# Patient Record
Sex: FEMALE | Race: WHITE | NOT HISPANIC OR LATINO | Employment: OTHER | URBAN - METROPOLITAN AREA
[De-identification: names, ages, dates, MRNs, and addresses within clinical notes are randomized per-mention and may not be internally consistent; named-entity substitution may affect disease eponyms.]

---

## 2017-09-07 RX ORDER — VALSARTAN AND HYDROCHLOROTHIAZIDE 320; 25 MG/1; MG/1
TABLET, FILM COATED ORAL
Qty: 90 TABLET | Refills: 1 | Status: SHIPPED | OUTPATIENT
Start: 2017-09-07 | End: 2018-04-20 | Stop reason: SDUPTHER

## 2017-09-07 RX ORDER — ATORVASTATIN CALCIUM 20 MG/1
TABLET, FILM COATED ORAL
Qty: 90 TABLET | Refills: 1 | Status: SHIPPED | OUTPATIENT
Start: 2017-09-07 | End: 2018-04-20 | Stop reason: SDUPTHER

## 2017-10-09 PROBLEM — J45.30 MILD PERSISTENT ASTHMA: Status: ACTIVE | Noted: 2017-10-09

## 2017-10-09 PROBLEM — G80.9 CEREBRAL PALSY: Status: ACTIVE | Noted: 2017-10-09

## 2017-10-09 PROBLEM — B19.10 HEPATITIS B: Status: ACTIVE | Noted: 2017-10-09

## 2017-10-09 PROBLEM — E78.5 HYPERLIPIDEMIA: Status: ACTIVE | Noted: 2017-10-09

## 2017-10-09 PROBLEM — J42 CHRONIC BRONCHITIS: Status: ACTIVE | Noted: 2017-10-09

## 2017-10-09 PROBLEM — I10 ESSENTIAL HYPERTENSION: Status: ACTIVE | Noted: 2017-10-09

## 2017-10-10 ENCOUNTER — OFFICE VISIT (OUTPATIENT)
Dept: PRIMARY CARE CLINIC | Facility: CLINIC | Age: 67
End: 2017-10-10
Payer: MEDICARE

## 2017-10-10 VITALS
DIASTOLIC BLOOD PRESSURE: 81 MMHG | TEMPERATURE: 99 F | HEART RATE: 76 BPM | SYSTOLIC BLOOD PRESSURE: 138 MMHG | OXYGEN SATURATION: 98 % | WEIGHT: 192.81 LBS | RESPIRATION RATE: 18 BRPM

## 2017-10-10 DIAGNOSIS — J45.31 MILD PERSISTENT ASTHMA WITH ACUTE EXACERBATION: Primary | ICD-10-CM

## 2017-10-10 PROCEDURE — 96372 THER/PROPH/DIAG INJ SC/IM: CPT | Mod: PBBFAC,PN

## 2017-10-10 PROCEDURE — 99213 OFFICE O/P EST LOW 20 MIN: CPT | Mod: PBBFAC,PN,25 | Performed by: NURSE PRACTITIONER

## 2017-10-10 PROCEDURE — 99999 PR PBB SHADOW E&M-EST. PATIENT-LVL III: CPT | Mod: PBBFAC,,, | Performed by: NURSE PRACTITIONER

## 2017-10-10 PROCEDURE — 99214 OFFICE O/P EST MOD 30 MIN: CPT | Mod: S$PBB,,, | Performed by: NURSE PRACTITIONER

## 2017-10-10 RX ORDER — DOXYCYCLINE 100 MG/1
100 CAPSULE ORAL EVERY 12 HOURS
Qty: 20 CAPSULE | Refills: 0 | Status: SHIPPED | OUTPATIENT
Start: 2017-10-10 | End: 2017-10-20

## 2017-10-10 RX ORDER — METHYLPREDNISOLONE 4 MG/1
TABLET ORAL
Qty: 1 PACKAGE | Refills: 0 | Status: SHIPPED | OUTPATIENT
Start: 2017-10-10 | End: 2018-10-03

## 2017-10-10 RX ORDER — BETAMETHASONE SODIUM PHOSPHATE AND BETAMETHASONE ACETATE 3; 3 MG/ML; MG/ML
6 INJECTION, SUSPENSION INTRA-ARTICULAR; INTRALESIONAL; INTRAMUSCULAR; SOFT TISSUE
Status: COMPLETED | OUTPATIENT
Start: 2017-10-10 | End: 2017-10-10

## 2017-10-10 RX ADMIN — BETAMETHASONE ACETATE AND BETAMETHASONE SODIUM PHOSPHATE 6 MG: 3; 3 INJECTION, SUSPENSION INTRA-ARTICULAR; INTRALESIONAL; INTRAMUSCULAR; SOFT TISSUE at 01:10

## 2017-10-10 NOTE — PROGRESS NOTES
"Chief Complaint  Chief Complaint   Patient presents with    Cough    Nasal Congestion       HPI  Julissa Singletary is a 66 y.o. female with medical diagnoses as listed within the medical history and problem list that presents for cough, congestion, and wheezing.  Patient is new to me but known to the clinic. Presents with worsening cough and wheezing for approximately 7 days. Wheezing worse at night. Cough is productive, white. No fever, sore throat, n/v/d. H/o asthma, controlled by "inhaler". Patient does not know name of the inhaler. States that this happens yearly and she frequently gets pneumonia following the exacerbation. Denies cp, sob, palpitations.     PAST MEDICAL HISTORY:  Past Medical History:   Diagnosis Date    Essential hypertension 10/9/2017       PAST SURGICAL HISTORY:  History reviewed. No pertinent surgical history.    SOCIAL HISTORY:  Social History     Social History    Marital status:      Spouse name: N/A    Number of children: N/A    Years of education: N/A     Occupational History    Not on file.     Social History Main Topics    Smoking status: Never Smoker    Smokeless tobacco: Never Used    Alcohol use Yes    Drug use: No    Sexual activity: Yes     Other Topics Concern    Not on file     Social History Narrative    No narrative on file       FAMILY HISTORY:  Family History   Problem Relation Age of Onset    No Known Problems Mother     No Known Problems Father     No Known Problems Sister     No Known Problems Brother     No Known Problems Maternal Aunt     No Known Problems Maternal Uncle     No Known Problems Paternal Aunt     No Known Problems Paternal Uncle     No Known Problems Maternal Grandmother     No Known Problems Maternal Grandfather     No Known Problems Paternal Grandmother     No Known Problems Paternal Grandfather     Amblyopia Neg Hx     Blindness Neg Hx     Cancer Neg Hx     Cataracts Neg Hx     Diabetes Neg Hx     Glaucoma Neg " Hx     Hypertension Neg Hx     Macular degeneration Neg Hx     Retinal detachment Neg Hx     Strabismus Neg Hx     Stroke Neg Hx     Thyroid disease Neg Hx        ALLERGIES AND MEDICATIONS: updated and reviewed.  Review of patient's allergies indicates:  No Known Allergies  Current Outpatient Prescriptions   Medication Sig Dispense Refill    atorvastatin (LIPITOR) 20 MG tablet TAKE 1 TABLET BY MOUTH ONCE DAILY 90 tablet 1    BYSTOLIC 5 mg Tab TK 1 T PO  ONCE D  1    trazodone (DESYREL) 50 MG tablet TK 1 TO 2 TS PO QHS PRN  2    valsartan-hydrochlorothiazide (DIOVAN-HCT) 320-25 mg per tablet TAKE 1 TABLET BY MOUTH ONCE DAILY 90 tablet 1    doxycycline (VIBRAMYCIN) 100 MG Cap Take 1 capsule (100 mg total) by mouth every 12 (twelve) hours. 20 capsule 0    methylPREDNISolone (MEDROL DOSEPACK) 4 mg tablet use as directed 1 Package 0     Current Facility-Administered Medications   Medication Dose Route Frequency Provider Last Rate Last Dose    betamethasone acetate-betamethasone sodium phosphate injection 6 mg  6 mg Intramuscular 1 time in Clinic/HOD Tanna Mckeon NP             ROS  Review of Systems   Constitutional: Negative for chills, fatigue and fever.   HENT: Positive for congestion. Negative for rhinorrhea, sinus pressure and sore throat.    Respiratory: Positive for cough and wheezing. Negative for chest tightness and shortness of breath.    Cardiovascular: Negative for chest pain and palpitations.   Gastrointestinal: Negative for blood in stool, diarrhea, nausea and vomiting.   Genitourinary: Negative for dysuria, frequency, hematuria and urgency.   Musculoskeletal: Negative for arthralgias and joint swelling.   Skin: Negative for rash and wound.   Neurological: Positive for headaches. Negative for dizziness.   Psychiatric/Behavioral: Negative for dysphoric mood and sleep disturbance. The patient is not nervous/anxious.            PHYSICAL EXAM  Vitals:    10/10/17 1316   BP: 138/81   BP  Location: Right arm   Patient Position: Sitting   BP Method: Medium (Automatic)   Pulse: 76   Resp: 18   Temp: 98.6 °F (37 °C)   TempSrc: Oral   SpO2: 98%   Weight: 87.5 kg (192 lb 12.8 oz)    There is no height or weight on file to calculate BMI.  Weight: 87.5 kg (192 lb 12.8 oz)           Physical Exam   Constitutional: She is oriented to person, place, and time. She appears well-developed and well-nourished.   HENT:   Head: Normocephalic.   Right Ear: Tympanic membrane normal.   Left Ear: Tympanic membrane normal.   Mouth/Throat: Uvula is midline and mucous membranes are normal. Posterior oropharyngeal erythema present.   Eyes: Conjunctivae are normal.   Cardiovascular: Normal rate, regular rhythm, normal heart sounds and normal pulses.    No murmur heard.  Pulses:       Radial pulses are 2+ on the right side, and 2+ on the left side.   Pulmonary/Chest: Effort normal. She has wheezes. She has no rhonchi. She has no rales.   Abdominal: Soft. Bowel sounds are increased. There is no tenderness.   Musculoskeletal: She exhibits no edema.   Lymphadenopathy:     She has no cervical adenopathy.   Neurological: She is alert and oriented to person, place, and time.   Skin: Skin is warm and dry. No rash noted.   Psychiatric: She has a normal mood and affect.         Health Maintenance       Date Due Completion Date    Hepatitis C Screening 1950 ---    TETANUS VACCINE 10/29/1969 ---    Mammogram 10/29/1991 ---    DEXA SCAN 10/29/1991 ---    Colonoscopy 10/29/2001 ---    Lipid Panel 04/06/2011 4/6/2006    Zoster Vaccine 10/29/2011 ---    Pneumococcal (65+) (1 of 2 - PCV13) 10/29/2016 ---            Assessment & Plan    Julissa was seen today for cough and nasal congestion.    Diagnoses and all orders for this visit:    Mild persistent asthma with acute exacerbation  -     betamethasone acetate-betamethasone sodium phosphate injection 6 mg; Inject 1 mL (6 mg total) into the muscle one time.  -     methylPREDNISolone (MEDROL  DOSEPACK) 4 mg tablet; use as directed  -     doxycycline (VIBRAMYCIN) 100 MG Cap; Take 1 capsule (100 mg total) by mouth every 12 (twelve) hours.  - Instructed to start doxy with fever, worsening of symptoms, sinus pressure after 3 more days.       Follow-up: Return if symptoms worsen or fail to improve.

## 2017-12-13 ENCOUNTER — OFFICE VISIT (OUTPATIENT)
Dept: PRIMARY CARE CLINIC | Facility: CLINIC | Age: 67
End: 2017-12-13
Payer: MEDICARE

## 2017-12-13 VITALS
DIASTOLIC BLOOD PRESSURE: 84 MMHG | HEART RATE: 78 BPM | TEMPERATURE: 98 F | WEIGHT: 199.31 LBS | HEIGHT: 62 IN | OXYGEN SATURATION: 97 % | BODY MASS INDEX: 36.68 KG/M2 | RESPIRATION RATE: 18 BRPM | SYSTOLIC BLOOD PRESSURE: 142 MMHG

## 2017-12-13 DIAGNOSIS — B34.9 VIRAL SYNDROME: Primary | ICD-10-CM

## 2017-12-13 DIAGNOSIS — I10 ESSENTIAL HYPERTENSION: ICD-10-CM

## 2017-12-13 DIAGNOSIS — J32.9 SINUSITIS, UNSPECIFIED CHRONICITY, UNSPECIFIED LOCATION: ICD-10-CM

## 2017-12-13 DIAGNOSIS — R05.9 COUGH: ICD-10-CM

## 2017-12-13 LAB
CTP QC/QA: YES
FLUAV AG NPH QL: NEGATIVE
FLUBV AG NPH QL: NEGATIVE

## 2017-12-13 PROCEDURE — 99214 OFFICE O/P EST MOD 30 MIN: CPT | Mod: PBBFAC,PN | Performed by: INTERNAL MEDICINE

## 2017-12-13 PROCEDURE — 99213 OFFICE O/P EST LOW 20 MIN: CPT | Mod: S$PBB,,, | Performed by: INTERNAL MEDICINE

## 2017-12-13 PROCEDURE — 87804 INFLUENZA ASSAY W/OPTIC: CPT | Mod: 59,PBBFAC,PN | Performed by: INTERNAL MEDICINE

## 2017-12-13 PROCEDURE — 99999 PR PBB SHADOW E&M-EST. PATIENT-LVL IV: CPT | Mod: PBBFAC,,, | Performed by: INTERNAL MEDICINE

## 2017-12-13 PROCEDURE — 96372 THER/PROPH/DIAG INJ SC/IM: CPT | Mod: PBBFAC,PN

## 2017-12-13 RX ORDER — LINCOMYCIN HYDROCHLORIDE 300 MG/ML
600 INJECTION, SOLUTION INTRAMUSCULAR; INTRAVENOUS; SUBCONJUNCTIVAL
Status: COMPLETED | OUTPATIENT
Start: 2017-12-13 | End: 2017-12-13

## 2017-12-13 RX ORDER — AMOXICILLIN AND CLAVULANATE POTASSIUM 875; 125 MG/1; MG/1
1 TABLET, FILM COATED ORAL EVERY 12 HOURS
Qty: 20 TABLET | Refills: 0 | Status: SHIPPED | OUTPATIENT
Start: 2017-12-13 | End: 2017-12-23

## 2017-12-13 RX ORDER — BETAMETHASONE SODIUM PHOSPHATE AND BETAMETHASONE ACETATE 3; 3 MG/ML; MG/ML
12 INJECTION, SUSPENSION INTRA-ARTICULAR; INTRALESIONAL; INTRAMUSCULAR; SOFT TISSUE
Status: COMPLETED | OUTPATIENT
Start: 2017-12-13 | End: 2017-12-13

## 2017-12-13 RX ORDER — CODEINE PHOSPHATE AND GUAIFENESIN 10; 100 MG/5ML; MG/5ML
5 SOLUTION ORAL 3 TIMES DAILY PRN
Qty: 150 ML | Refills: 0 | Status: SHIPPED | OUTPATIENT
Start: 2017-12-13 | End: 2017-12-23

## 2017-12-13 RX ADMIN — LINCOMYCIN HYDROCHLORIDE 600 MG: 300 INJECTION, SOLUTION INTRAMUSCULAR; INTRAVENOUS; SUBCONJUNCTIVAL at 05:12

## 2017-12-13 RX ADMIN — BETAMETHASONE ACETATE AND BETAMETHASONE SODIUM PHOSPHATE 12 MG: 3; 3 INJECTION, SUSPENSION INTRA-ARTICULAR; INTRALESIONAL; INTRAMUSCULAR; SOFT TISSUE at 05:12

## 2017-12-14 NOTE — PROGRESS NOTES
Subjective:       Patient ID: Julissa Singletary is a 67 y.o. female.    Chief Complaint: Bronchitis    HPI   Patient complaining of cough and congestion short of breat for for 5 days coughing up white yellow phlegm happen once a year in the wintertime it did have a flu shot this year no nausea vomiting diarrhea  Review of Systems    Objective:      Physical Exam   Constitutional: She is oriented to person, place, and time. She appears well-developed and well-nourished. No distress.   HENT:   Head: Normocephalic and atraumatic.   Right Ear: External ear normal.   Left Ear: External ear normal.   Nose: Nose normal.   Mouth/Throat: Oropharynx is clear and moist. No oropharyngeal exudate.   Eyes: Conjunctivae and EOM are normal. Pupils are equal, round, and reactive to light. Right eye exhibits no discharge. Left eye exhibits no discharge.   Neck: Normal range of motion. Neck supple. No thyromegaly present.   Cardiovascular: Normal rate, regular rhythm, normal heart sounds and intact distal pulses.  Exam reveals no gallop and no friction rub.    No murmur heard.  Pulmonary/Chest: Effort normal and breath sounds normal. No respiratory distress. She has no wheezes. She has no rales. She exhibits no tenderness.   Abdominal: Soft. Bowel sounds are normal. She exhibits no distension. There is no tenderness. There is no rebound and no guarding.   Musculoskeletal: Normal range of motion. She exhibits no edema, tenderness or deformity.   Lymphadenopathy:     She has no cervical adenopathy.   Neurological: She is alert and oriented to person, place, and time.   Skin: Skin is warm and dry. Capillary refill takes less than 2 seconds. No rash noted. No erythema.   Psychiatric: She has a normal mood and affect. Judgment and thought content normal.   Nursing note and vitals reviewed.      Assessment:       1. Viral syndrome    2. Sinusitis, unspecified chronicity, unspecified location    3. Cough    4. Essential hypertension         Plan:       Viral syndrome  -     POCT Influenza A/B  -     betamethasone acetate-betamethasone sodium phosphate injection 12 mg; Inject 2 mLs (12 mg total) into the muscle one time.  -     lincomycin injection 600 mg; Inject 2 mLs (600 mg total) into the muscle one time.    Sinusitis, unspecified chronicity, unspecified location  -     guaifenesin-codeine 100-10 mg/5 ml (TUSSI-ORGANIDIN NR)  mg/5 mL syrup; Take 5 mLs by mouth 3 (three) times daily as needed.  Dispense: 150 mL; Refill: 0  -     amoxicillin-clavulanate 875-125mg (AUGMENTIN) 875-125 mg per tablet; Take 1 tablet by mouth every 12 (twelve) hours.  Dispense: 20 tablet; Refill: 0  -     betamethasone acetate-betamethasone sodium phosphate injection 12 mg; Inject 2 mLs (12 mg total) into the muscle one time.  -     lincomycin injection 600 mg; Inject 2 mLs (600 mg total) into the muscle one time.    Cough    Essential hypertension

## 2017-12-14 NOTE — PROGRESS NOTES
Patient ID by name and . Celestone 12mg injection given in left VG and Lincocin 600mg injection given in right VG no blood noted at injection site. Patient tolerated well. Given per physicians order.

## 2017-12-27 RX ORDER — NEBIVOLOL HYDROCHLORIDE 5 MG/1
TABLET ORAL
Qty: 90 TABLET | Refills: 3 | Status: SHIPPED | OUTPATIENT
Start: 2017-12-27 | End: 2019-01-10 | Stop reason: SDUPTHER

## 2018-04-20 RX ORDER — ATORVASTATIN CALCIUM 20 MG/1
TABLET, FILM COATED ORAL
Qty: 90 TABLET | Refills: 3 | Status: SHIPPED | OUTPATIENT
Start: 2018-04-20 | End: 2019-05-16 | Stop reason: SDUPTHER

## 2018-04-20 RX ORDER — VALSARTAN AND HYDROCHLOROTHIAZIDE 320; 25 MG/1; MG/1
TABLET, FILM COATED ORAL
Qty: 90 TABLET | Refills: 3 | Status: SHIPPED | OUTPATIENT
Start: 2018-04-20 | End: 2018-12-05 | Stop reason: SDUPTHER

## 2018-10-03 ENCOUNTER — OFFICE VISIT (OUTPATIENT)
Dept: PRIMARY CARE CLINIC | Facility: CLINIC | Age: 68
End: 2018-10-03
Payer: MEDICARE

## 2018-10-03 VITALS
RESPIRATION RATE: 18 BRPM | SYSTOLIC BLOOD PRESSURE: 131 MMHG | HEART RATE: 61 BPM | DIASTOLIC BLOOD PRESSURE: 80 MMHG | WEIGHT: 196 LBS | BODY MASS INDEX: 36.07 KG/M2 | OXYGEN SATURATION: 100 % | TEMPERATURE: 97 F | HEIGHT: 62 IN

## 2018-10-03 DIAGNOSIS — I10 ESSENTIAL HYPERTENSION: Primary | ICD-10-CM

## 2018-10-03 DIAGNOSIS — Z12.31 ENCOUNTER FOR SCREENING MAMMOGRAM FOR BREAST CANCER: ICD-10-CM

## 2018-10-03 DIAGNOSIS — Z23 NEED FOR VACCINATION: ICD-10-CM

## 2018-10-03 DIAGNOSIS — Z11.59 NEED FOR HEPATITIS C SCREENING TEST: ICD-10-CM

## 2018-10-03 DIAGNOSIS — Z12.11 COLON CANCER SCREENING: ICD-10-CM

## 2018-10-03 DIAGNOSIS — E78.5 HYPERLIPIDEMIA, UNSPECIFIED HYPERLIPIDEMIA TYPE: ICD-10-CM

## 2018-10-03 DIAGNOSIS — M89.9 DISORDER OF BONE: ICD-10-CM

## 2018-10-03 DIAGNOSIS — F51.01 PRIMARY INSOMNIA: ICD-10-CM

## 2018-10-03 PROCEDURE — 90670 PCV13 VACCINE IM: CPT | Mod: PBBFAC,PN

## 2018-10-03 PROCEDURE — 99214 OFFICE O/P EST MOD 30 MIN: CPT | Mod: PBBFAC,PN | Performed by: FAMILY MEDICINE

## 2018-10-03 PROCEDURE — 99214 OFFICE O/P EST MOD 30 MIN: CPT | Mod: S$PBB,,, | Performed by: FAMILY MEDICINE

## 2018-10-03 PROCEDURE — 99999 PR PBB SHADOW E&M-EST. PATIENT-LVL IV: CPT | Mod: PBBFAC,,, | Performed by: FAMILY MEDICINE

## 2018-10-03 RX ORDER — ASPIRIN 81 MG/1
81 TABLET ORAL DAILY
COMMUNITY
End: 2019-07-29

## 2018-10-03 RX ORDER — TRAZODONE HYDROCHLORIDE 50 MG/1
TABLET ORAL
Qty: 60 TABLET | Refills: 5 | Status: SHIPPED | OUTPATIENT
Start: 2018-10-03 | End: 2019-05-15

## 2018-10-03 NOTE — PROGRESS NOTES
Patient verified by name and . Pneumo 13 vaccine given IM to right deltoid using aseptic technique. Patient tolerated well

## 2018-10-18 DIAGNOSIS — M81.0 OSTEOPOROSIS WITHOUT CURRENT PATHOLOGICAL FRACTURE, UNSPECIFIED OSTEOPOROSIS TYPE: ICD-10-CM

## 2018-10-18 RX ORDER — LYSINE HCL 500 MG
1 TABLET ORAL 2 TIMES DAILY
Qty: 180 TABLET | Refills: 3 | Status: SHIPPED | OUTPATIENT
Start: 2018-10-18 | End: 2019-07-29

## 2018-10-18 RX ORDER — ALENDRONATE SODIUM 70 MG/1
70 TABLET ORAL
Qty: 12 TABLET | Refills: 3 | Status: SHIPPED | OUTPATIENT
Start: 2018-10-18 | End: 2019-07-29

## 2018-10-19 ENCOUNTER — TELEPHONE (OUTPATIENT)
Dept: PRIMARY CARE CLINIC | Facility: CLINIC | Age: 68
End: 2018-10-19

## 2018-10-19 NOTE — TELEPHONE ENCOUNTER
----- Message from Adriana Colin sent at 10/19/2018  2:48 PM CDT -----  Contact: self  Type:  Patient Returning Call    Who Called:  self  Who Left Message for Patient:  Not sure  Does the patient know what this is regarding?:  yes  Best Call Back Number:  [phone]  Additional Information:  Results. Thanks!

## 2018-10-22 ENCOUNTER — OFFICE VISIT (OUTPATIENT)
Dept: OPTOMETRY | Facility: CLINIC | Age: 68
End: 2018-10-22
Payer: MEDICARE

## 2018-10-22 DIAGNOSIS — H52.4 MYOPIA WITH PRESBYOPIA OF BOTH EYES: ICD-10-CM

## 2018-10-22 DIAGNOSIS — H52.13 MYOPIA WITH PRESBYOPIA OF BOTH EYES: ICD-10-CM

## 2018-10-22 DIAGNOSIS — H25.13 NUCLEAR SCLEROSIS OF BOTH EYES: Primary | ICD-10-CM

## 2018-10-22 DIAGNOSIS — H43.393 VITREOUS FLOATERS OF BOTH EYES: ICD-10-CM

## 2018-10-22 DIAGNOSIS — H26.9 CORTICAL CATARACT OF BOTH EYES: ICD-10-CM

## 2018-10-22 PROCEDURE — 99999 PR PBB SHADOW E&M-EST. PATIENT-LVL III: CPT | Mod: PBBFAC,,, | Performed by: OPTOMETRIST

## 2018-10-22 PROCEDURE — 92014 COMPRE OPH EXAM EST PT 1/>: CPT | Mod: S$PBB,,, | Performed by: OPTOMETRIST

## 2018-10-22 PROCEDURE — 92015 DETERMINE REFRACTIVE STATE: CPT | Mod: ,,, | Performed by: OPTOMETRIST

## 2018-10-22 PROCEDURE — 99213 OFFICE O/P EST LOW 20 MIN: CPT | Mod: PBBFAC | Performed by: OPTOMETRIST

## 2018-10-22 NOTE — PROGRESS NOTES
"HPI     failed vision screening at [facility]      Additional comments: No acute ocular/vision problems.  Reports difficulty with vision test at [facility] recently.                 Comments     Age: 68 yo female.  Last Eye Exam: 7/25/2016 w/ Dr. Timmy Conway at [facility].    Pt here because she failed vision screening at [facility].  ---------------------------------------------------------------------------  -------  CURRENT EYE PROBLEMS:  (--) Eye pain/discomfort  (+) Blurry Vision w/ current spectacles.  (--) Double Vision  (--) Itchy Eyes  (--) Watery Eyes  (--) Dry Eyes  (+) Floaters os intermittently  (+) Black Spots os intermittenly  (--) Flashes  (--) Headaches  (--) Photophobia  ---------------------------------------------------------------------------  -------  MEDICAL / OCULAR HISTORY:  Eye Surgeries: none  Eye Injuries: none  Eye Disease(s): cataracts, presbyopia, vitreous floaters bilateral.  Diabetes: no   HTN: yes - takes medication to control.    FAMILY OCULAR HISTORY:  Diabetes - mother  ---------------------------------------------------------------------------  -------  !!!ALLERGIES!!!  NONE    EYE MEDICATIONS:  NONE  ---------------------------------------------------------------------------  -------  SPECTACLES/CONTACTS LENSES:  Glasses: yes - PALs  ---------------------------------------------------------------------------  -------  Pt okay with use of anesthetic eyedrops? yes  Pt okay with use of dilating drops? Yes    PD 61/57  Reading dist = 17.25"             Last edited by Timmy Conway, OD on 10/22/2018  2:01 PM. (History)            Assessment /Plan     For exam results, see Encounter Report.    1. Nuclear sclerosis of both eyes     2. Cortical cataract of both eyes     3. Vitreous floaters of both eyes     4. Myopia with presbyopia of both eyes                  Early nuclear sclerosis of lens of both eyes, and early peripheral cortical cataract in both eyes.  No need for cataract surgery in either " eye.  Otherwise, good ocular health in each eye.  Vitreous floaters in both eyes without evidence of retinal etiology.  Myopia (nearsightedness) in each eye.  Presbyopia consistent with age.  New spectacle lens Rx issued for use as desired.  Okay to read without glasses, if desired.  Recheck in 12 -1 8 months - or prior if any problems in the interim.

## 2018-10-22 NOTE — PATIENT INSTRUCTIONS
Early nuclear sclerosis of lens of both eyes, and early peripheral cortical cataract in both eyes.  No need for cataract surgery in either eye.  Otherwise, good ocular health in each eye.  Vitreous floaters in both eyes without evidence of retinal etiology.  Myopia (nearsightedness) in each eye.  Presbyopia consistent with age.  New spectacle lens Rx issued for use as desired.  Okay to read without glasses, if desired.  Recheck in 12 -1 8 months - or prior if any problems in the interim.

## 2018-12-05 RX ORDER — VALSARTAN AND HYDROCHLOROTHIAZIDE 320; 25 MG/1; MG/1
TABLET, FILM COATED ORAL
Qty: 90 TABLET | Refills: 1 | Status: SHIPPED | OUTPATIENT
Start: 2018-12-05 | End: 2019-07-14 | Stop reason: SDUPTHER

## 2019-01-10 RX ORDER — NEBIVOLOL HYDROCHLORIDE 5 MG/1
TABLET ORAL
Qty: 90 TABLET | Refills: 1 | Status: SHIPPED | OUTPATIENT
Start: 2019-01-10 | End: 2019-04-17

## 2019-02-19 ENCOUNTER — OFFICE VISIT (OUTPATIENT)
Dept: PRIMARY CARE CLINIC | Facility: CLINIC | Age: 69
End: 2019-02-19
Payer: MEDICARE

## 2019-02-19 VITALS
DIASTOLIC BLOOD PRESSURE: 76 MMHG | RESPIRATION RATE: 18 BRPM | OXYGEN SATURATION: 96 % | HEART RATE: 88 BPM | BODY MASS INDEX: 35.15 KG/M2 | TEMPERATURE: 98 F | WEIGHT: 191 LBS | SYSTOLIC BLOOD PRESSURE: 110 MMHG | HEIGHT: 62 IN

## 2019-02-19 DIAGNOSIS — J01.00 ACUTE NON-RECURRENT MAXILLARY SINUSITIS: Primary | ICD-10-CM

## 2019-02-19 DIAGNOSIS — J20.9 ACUTE BRONCHITIS, UNSPECIFIED ORGANISM: ICD-10-CM

## 2019-02-19 PROCEDURE — 99213 OFFICE O/P EST LOW 20 MIN: CPT | Mod: PBBFAC,PN | Performed by: FAMILY MEDICINE

## 2019-02-19 PROCEDURE — 99214 OFFICE O/P EST MOD 30 MIN: CPT | Mod: S$PBB,,, | Performed by: FAMILY MEDICINE

## 2019-02-19 PROCEDURE — 96372 THER/PROPH/DIAG INJ SC/IM: CPT | Mod: PBBFAC,PN

## 2019-02-19 PROCEDURE — 99214 PR OFFICE/OUTPT VISIT, EST, LEVL IV, 30-39 MIN: ICD-10-PCS | Mod: S$PBB,,, | Performed by: FAMILY MEDICINE

## 2019-02-19 PROCEDURE — 99999 PR PBB SHADOW E&M-EST. PATIENT-LVL III: ICD-10-PCS | Mod: PBBFAC,,, | Performed by: FAMILY MEDICINE

## 2019-02-19 PROCEDURE — 99999 PR PBB SHADOW E&M-EST. PATIENT-LVL III: CPT | Mod: PBBFAC,,, | Performed by: FAMILY MEDICINE

## 2019-02-19 RX ORDER — ALBUTEROL SULFATE 90 UG/1
2 AEROSOL, METERED RESPIRATORY (INHALATION) EVERY 4 HOURS PRN
Qty: 18 G | Refills: 1 | Status: SHIPPED | OUTPATIENT
Start: 2019-02-19 | End: 2019-07-29

## 2019-02-19 RX ORDER — AMOXICILLIN AND CLAVULANATE POTASSIUM 875; 125 MG/1; MG/1
1 TABLET, FILM COATED ORAL 2 TIMES DAILY
Qty: 20 TABLET | Refills: 0 | Status: SHIPPED | OUTPATIENT
Start: 2019-02-19 | End: 2019-03-01

## 2019-02-19 RX ORDER — BETAMETHASONE SODIUM PHOSPHATE AND BETAMETHASONE ACETATE 3; 3 MG/ML; MG/ML
12 INJECTION, SUSPENSION INTRA-ARTICULAR; INTRALESIONAL; INTRAMUSCULAR; SOFT TISSUE
Status: COMPLETED | OUTPATIENT
Start: 2019-02-19 | End: 2019-02-19

## 2019-02-19 RX ADMIN — BETAMETHASONE ACETATE AND BETAMETHASONE SODIUM PHOSPHATE 12 MG: 3; 3 INJECTION, SUSPENSION INTRA-ARTICULAR; INTRALESIONAL; INTRAMUSCULAR; SOFT TISSUE at 12:02

## 2019-02-19 NOTE — PROGRESS NOTES
Pt identified by name and date of birth, injection administered as ordered by aseptic technique tolerated well by pt

## 2019-02-19 NOTE — PROGRESS NOTES
"Subjective:       Patient ID: Julissa Singletary is a 68 y.o. female.    Chief Complaint: Cough (Coughing up a yellow/clear phlegm since last Thursday )    Cough   This is a recurrent problem. The current episode started in the past 7 days. The problem has been unchanged. The problem occurs every few minutes. The cough is productive of sputum. Associated symptoms include chest pain and nasal congestion. Pertinent negatives include no fever, hemoptysis, rash, shortness of breath or wheezing. Her past medical history is significant for bronchitis.     Review of Systems   Constitutional: Negative for fever.   HENT: Negative for trouble swallowing.    Respiratory: Positive for cough. Negative for hemoptysis, shortness of breath and wheezing.    Cardiovascular: Positive for chest pain.   Gastrointestinal: Negative for vomiting.   Genitourinary: Negative for difficulty urinating.   Skin: Negative for rash.   Psychiatric/Behavioral: Negative for agitation and confusion.       Objective:      Vitals:    02/19/19 1147   BP: 110/76   BP Location: Left arm   Patient Position: Sitting   BP Method: Large (Automatic)   Pulse: 88   Resp: 18   Temp: 98.3 °F (36.8 °C)   TempSrc: Oral   SpO2: 96%   Weight: 86.6 kg (191 lb)   Height: 5' 2" (1.575 m)     Physical Exam   Constitutional: She is oriented to person, place, and time. She appears well-developed and well-nourished.   HENT:   Head: Normocephalic and atraumatic.   Right Ear: Tympanic membrane normal.   Left Ear: Tympanic membrane normal.   Nose: Right sinus exhibits maxillary sinus tenderness. Left sinus exhibits maxillary sinus tenderness.   Mouth/Throat: Oropharynx is clear and moist.   Eyes: EOM are normal. Pupils are equal, round, and reactive to light.   Neck: Neck supple. No JVD present.   Cardiovascular: Normal rate, regular rhythm and normal heart sounds.   Pulmonary/Chest: Effort normal. No respiratory distress. She has wheezes (diffuse expiratory). She has no " rhonchi. She has no rales.   Musculoskeletal: She exhibits no edema.   Neurological: She is alert and oriented to person, place, and time.   Skin: Skin is warm and dry.   Nursing note and vitals reviewed.      Assessment:       1. Acute non-recurrent maxillary sinusitis    2. Acute bronchitis, unspecified organism        Plan:       Acute non-recurrent maxillary sinusitis  -     betamethasone acetate-betamethasone sodium phosphate injection 12 mg  -     amoxicillin-clavulanate 875-125mg (AUGMENTIN) 875-125 mg per tablet; Take 1 tablet by mouth 2 (two) times daily. for 10 days  Dispense: 20 tablet; Refill: 0    Acute bronchitis, unspecified organism  -     albuterol (VENTOLIN HFA) 90 mcg/actuation inhaler; Inhale 2 puffs into the lungs every 4 (four) hours as needed for Wheezing or Shortness of Breath. Rescue  Dispense: 18 g; Refill: 1      Medication List with Changes/Refills   New Medications    ALBUTEROL (VENTOLIN HFA) 90 MCG/ACTUATION INHALER    Inhale 2 puffs into the lungs every 4 (four) hours as needed for Wheezing or Shortness of Breath. Rescue    AMOXICILLIN-CLAVULANATE 875-125MG (AUGMENTIN) 875-125 MG PER TABLET    Take 1 tablet by mouth 2 (two) times daily. for 10 days   Current Medications    ALENDRONATE (FOSAMAX) 70 MG TABLET    Take 1 tablet (70 mg total) by mouth every 7 days.    ASPIRIN (ECOTRIN) 81 MG EC TABLET    Take 81 mg by mouth once daily.    ATORVASTATIN (LIPITOR) 20 MG TABLET    TAKE 1 TABLET BY MOUTH ONCE DAILY    BYSTOLIC 5 MG TAB    TAKE 1 TABLET BY MOUTH ONCE DAILY    CALCIUM CARBONATE-VIT D3- MG CALCIUM- 400 UNIT TAB    Take 1 tablet by mouth 2 (two) times daily.    TRAZODONE (DESYREL) 50 MG TABLET    1-2 tablets at bedtime as needed for insomnia    VALSARTAN-HYDROCHLOROTHIAZIDE (DIOVAN-HCT) 320-25 MG PER TABLET    TAKE 1 TABLET BY MOUTH ONCE DAILY

## 2019-04-17 ENCOUNTER — TELEPHONE (OUTPATIENT)
Dept: PRIMARY CARE CLINIC | Facility: CLINIC | Age: 69
End: 2019-04-17

## 2019-04-17 DIAGNOSIS — I10 ESSENTIAL HYPERTENSION: ICD-10-CM

## 2019-04-17 DIAGNOSIS — I10 ESSENTIAL HYPERTENSION: Primary | ICD-10-CM

## 2019-04-17 RX ORDER — CARVEDILOL 6.25 MG/1
6.25 TABLET ORAL 2 TIMES DAILY WITH MEALS
Qty: 60 TABLET | Refills: 1 | Status: SHIPPED | OUTPATIENT
Start: 2019-04-17 | End: 2019-06-17 | Stop reason: SDUPTHER

## 2019-04-17 RX ORDER — CARVEDILOL 6.25 MG/1
TABLET ORAL
Qty: 180 TABLET | Refills: 1 | OUTPATIENT
Start: 2019-04-17

## 2019-04-17 NOTE — TELEPHONE ENCOUNTER
Switch to carvedilol 6.25 mg b.i.d..  Have patient added to nurse schedule in 2 weeks for blood pressure check.

## 2019-04-17 NOTE — TELEPHONE ENCOUNTER
Patient notified and states understanding. She says she has about 2 weeks left of the Benicar but once she is done with it, she will start the Carvedilol. I made her a nurse visit for BP check in 4 weeks.

## 2019-04-17 NOTE — TELEPHONE ENCOUNTER
----- Message from Aixa Escobar sent at 4/17/2019  9:18 AM CDT -----  Pt stopped in wants to see if you can change her blood pressure to something cheaper or generic. She is currently on bystolic 5mg 1 x daily. She said this rx is costing her 80.00  She uses [facility] in [place].

## 2019-05-15 ENCOUNTER — TELEPHONE (OUTPATIENT)
Dept: PRIMARY CARE CLINIC | Facility: CLINIC | Age: 69
End: 2019-05-15

## 2019-05-15 ENCOUNTER — CLINICAL SUPPORT (OUTPATIENT)
Dept: PRIMARY CARE CLINIC | Facility: CLINIC | Age: 69
End: 2019-05-15
Payer: MEDICARE

## 2019-05-15 VITALS — DIASTOLIC BLOOD PRESSURE: 72 MMHG | SYSTOLIC BLOOD PRESSURE: 132 MMHG | HEART RATE: 60 BPM

## 2019-05-15 PROCEDURE — 99999 PR PBB SHADOW E&M-EST. PATIENT-LVL II: CPT | Mod: PBBFAC,,,

## 2019-05-15 PROCEDURE — 99999 PR PBB SHADOW E&M-EST. PATIENT-LVL II: ICD-10-PCS | Mod: PBBFAC,,,

## 2019-05-15 PROCEDURE — 99212 OFFICE O/P EST SF 10 MIN: CPT | Mod: PBBFAC,PN

## 2019-05-15 RX ORDER — LORAZEPAM 1 MG/1
1 TABLET ORAL NIGHTLY PRN
Qty: 30 TABLET | Refills: 1 | Status: SHIPPED | OUTPATIENT
Start: 2019-05-15 | End: 2019-10-11 | Stop reason: SDUPTHER

## 2019-05-15 NOTE — PROGRESS NOTES
Patient ID verified by name and . NKDA. Blood pressure and pulse obtained. Patient currently taking valsartan-HCTZ and Carvedilol 6.25 mg BID. Physician notified. Patient instructed to continue on current BP medications and to F/U as needed. Patient verbalized understanding.

## 2019-05-16 RX ORDER — ATORVASTATIN CALCIUM 20 MG/1
TABLET, FILM COATED ORAL
Qty: 90 TABLET | Refills: 3 | Status: SHIPPED | OUTPATIENT
Start: 2019-05-16 | End: 2020-06-29

## 2019-06-17 DIAGNOSIS — I10 ESSENTIAL HYPERTENSION: ICD-10-CM

## 2019-06-17 RX ORDER — CARVEDILOL 6.25 MG/1
TABLET ORAL
Qty: 60 TABLET | Refills: 5 | Status: SHIPPED | OUTPATIENT
Start: 2019-06-17 | End: 2020-01-28

## 2019-07-15 RX ORDER — VALSARTAN AND HYDROCHLOROTHIAZIDE 320; 25 MG/1; MG/1
TABLET, FILM COATED ORAL
Qty: 90 TABLET | Refills: 1 | Status: SHIPPED | OUTPATIENT
Start: 2019-07-15 | End: 2019-07-22 | Stop reason: SDUPTHER

## 2019-07-22 RX ORDER — VALSARTAN AND HYDROCHLOROTHIAZIDE 320; 25 MG/1; MG/1
1 TABLET, FILM COATED ORAL DAILY
Qty: 90 TABLET | Refills: 1 | Status: SHIPPED | OUTPATIENT
Start: 2019-07-22 | End: 2019-07-23 | Stop reason: SDUPTHER

## 2019-07-22 NOTE — TELEPHONE ENCOUNTER
----- Message from Chasidy Rangel sent at 7/22/2019 11:37 AM CDT -----  Type: Needs Medical Advice    Who Called:  patient  Symptoms (please be specific):  na  How long has patient had these symptoms:  na  Pharmacy name and phone #:    [id number] - [place] - [address] AT [facility] [address]  [address] [address]  Phone: [phone] Fax: [phone]  Best Call Back Number: [phone]  Additional Information: patient is calling to check the status of having her new prescription sent to pharmacy/looks like it was printed but not sent to pharmacy/please send and advise patient when sent/patient thinks this is the Valsartan

## 2019-07-23 RX ORDER — VALSARTAN AND HYDROCHLOROTHIAZIDE 320; 25 MG/1; MG/1
1 TABLET, FILM COATED ORAL DAILY
Qty: 90 TABLET | Refills: 1 | Status: SHIPPED | OUTPATIENT
Start: 2019-07-23 | End: 2020-05-18

## 2019-07-23 RX ORDER — VALSARTAN AND HYDROCHLOROTHIAZIDE 320; 25 MG/1; MG/1
TABLET, FILM COATED ORAL
Qty: 90 TABLET | Refills: 0 | Status: SHIPPED | OUTPATIENT
Start: 2019-07-23 | End: 2019-07-29

## 2019-07-29 ENCOUNTER — OFFICE VISIT (OUTPATIENT)
Dept: PRIMARY CARE CLINIC | Facility: CLINIC | Age: 69
End: 2019-07-29
Payer: MEDICARE

## 2019-07-29 VITALS
OXYGEN SATURATION: 99 % | HEART RATE: 60 BPM | WEIGHT: 186 LBS | RESPIRATION RATE: 18 BRPM | SYSTOLIC BLOOD PRESSURE: 119 MMHG | TEMPERATURE: 98 F | HEIGHT: 62 IN | BODY MASS INDEX: 34.23 KG/M2 | DIASTOLIC BLOOD PRESSURE: 78 MMHG

## 2019-07-29 DIAGNOSIS — G80.9 CEREBRAL PALSY, UNSPECIFIED TYPE: ICD-10-CM

## 2019-07-29 DIAGNOSIS — Z23 NEED FOR VACCINATION: ICD-10-CM

## 2019-07-29 DIAGNOSIS — D17.30 LIPOMA OF SKIN: Primary | ICD-10-CM

## 2019-07-29 PROCEDURE — 99999 PR PBB SHADOW E&M-EST. PATIENT-LVL III: ICD-10-PCS | Mod: PBBFAC,,, | Performed by: FAMILY MEDICINE

## 2019-07-29 PROCEDURE — 99213 OFFICE O/P EST LOW 20 MIN: CPT | Mod: PBBFAC,PN | Performed by: FAMILY MEDICINE

## 2019-07-29 PROCEDURE — 99213 PR OFFICE/OUTPT VISIT, EST, LEVL III, 20-29 MIN: ICD-10-PCS | Mod: S$PBB,,, | Performed by: FAMILY MEDICINE

## 2019-07-29 PROCEDURE — 90714 TD VACC NO PRESV 7 YRS+ IM: CPT | Mod: PBBFAC,PN

## 2019-07-29 PROCEDURE — 99999 PR PBB SHADOW E&M-EST. PATIENT-LVL III: CPT | Mod: PBBFAC,,, | Performed by: FAMILY MEDICINE

## 2019-07-29 PROCEDURE — 99213 OFFICE O/P EST LOW 20 MIN: CPT | Mod: S$PBB,,, | Performed by: FAMILY MEDICINE

## 2019-07-29 RX ORDER — ACETAMINOPHEN 500 MG
1 TABLET ORAL NIGHTLY PRN
COMMUNITY

## 2019-07-29 NOTE — PROGRESS NOTES
Verified pt ID using name and . NKDA. Administered TD in left deltoid per physician order using aseptic technique. Aspirated and no blood return noted. Pt tolerated well with no adverse reactions noted.

## 2019-07-29 NOTE — PROGRESS NOTES
"Subjective:       Patient ID: Julissa Singletary is a 68 y.o. female.    Chief Complaint: Mass (says she noticed a pea sized "lump" in her abdomen about 3 months ago and it has gotten bigger. She says it's about the size of a marble now )    Noticed a nontender lump on her left abdominal wall a couple of months ago, and recently noticed a similar bump on her left thigh.  No pain or overlying skin changes.  No drainage. No redness.  No fevers, chills or weight loss.    Review of Systems   Constitutional: Negative for fever.   Respiratory: Negative for shortness of breath.    Cardiovascular: Negative for chest pain.   Skin: Negative for color change, rash and wound.   Neurological: Positive for weakness (Congenital).   Hematological: Does not bruise/bleed easily.       Objective:      Vitals:    07/29/19 0833   BP: 119/78   BP Location: Left arm   Patient Position: Sitting   BP Method: Large (Automatic)   Pulse: 60   Resp: 18   Temp: 98.1 °F (36.7 °C)   TempSrc: Oral   SpO2: 99%   Weight: 84.4 kg (186 lb)   Height: 5' 2" (1.575 m)     Physical Exam   Constitutional: She is oriented to person, place, and time. She appears well-developed and well-nourished.   HENT:   Head: Normocephalic and atraumatic.   Cardiovascular: Normal rate, regular rhythm and normal heart sounds.   Pulmonary/Chest: Effort normal and breath sounds normal.   Musculoskeletal: She exhibits no edema.   Neurological: She is alert and oriented to person, place, and time.   Skin: Skin is warm and dry.   Nontender 1 cm mobile subcutaneous mass to left upper abdominal wall and left anterior thigh, consistent with lipomas   Nursing note and vitals reviewed.      Assessment:       1. Lipoma of skin    2. Need for vaccination    3. Cerebral palsy, unspecified type        Plan:       Lipoma of skin  Reassured patient that this is a benign process, no intervention needed at this time.  Can be referred for excision if she desires at a later date  Need for " vaccination  -     Td Vaccine (Adult) - Preservative Free  -     varicella-zoster gE-AS01B, PF, (SHINGRIX, PF,) 50 mcg/0.5 mL injection; Inject 0.5 mLs into the muscle once. for 1 dose  Dispense: 0.5 mL; Refill: 0    Cerebral palsy, unspecified type  Stable, congenital, no recent issues    Medication List with Changes/Refills   New Medications    VARICELLA-ZOSTER [id number], PF, (SHINGRIX, PF,) 50 MCG/0.5 ML INJECTION    Inject 0.5 mLs into the muscle once. for 1 dose   Current Medications    ATORVASTATIN (LIPITOR) 20 MG TABLET    TAKE 1 TABLET BY MOUTH ONCE DAILY    CARVEDILOL (COREG) 6.25 MG TABLET    TAKE 1 TABLET(6.25 MG) BY MOUTH TWICE DAILY WITH MEALS    LORAZEPAM (ATIVAN) 1 MG TABLET    Take 1 tablet (1 mg total) by mouth nightly as needed (insomnia).    MELATONIN 5 MG TAB    Take 1 tablet by mouth nightly as needed.    TURMERIC ORAL    Take 1 tablet by mouth once daily.    VALSARTAN-HYDROCHLOROTHIAZIDE (DIOVAN-HCT) 320-25 MG PER TABLET    Take 1 tablet by mouth once daily.   Discontinued Medications    ALBUTEROL (VENTOLIN HFA) 90 MCG/ACTUATION INHALER    Inhale 2 puffs into the lungs every 4 (four) hours as needed for Wheezing or Shortness of Breath. Rescue    ALENDRONATE (FOSAMAX) 70 MG TABLET    Take 1 tablet (70 mg total) by mouth every 7 days.    ASPIRIN (ECOTRIN) 81 MG EC TABLET    Take 81 mg by mouth once daily.    CALCIUM CARBONATE-VIT D3- MG CALCIUM- 400 UNIT TAB    Take 1 tablet by mouth 2 (two) times daily.    VALSARTAN-HYDROCHLOROTHIAZIDE (DIOVAN-HCT) 320-25 MG PER TABLET    TAKE 1 TABLET BY MOUTH ONCE DAILY

## 2019-09-10 ENCOUNTER — OFFICE VISIT (OUTPATIENT)
Dept: PRIMARY CARE CLINIC | Facility: CLINIC | Age: 69
End: 2019-09-10
Payer: MEDICARE

## 2019-09-10 VITALS
HEART RATE: 78 BPM | BODY MASS INDEX: 34.04 KG/M2 | OXYGEN SATURATION: 98 % | WEIGHT: 185 LBS | DIASTOLIC BLOOD PRESSURE: 82 MMHG | SYSTOLIC BLOOD PRESSURE: 132 MMHG | RESPIRATION RATE: 18 BRPM | HEIGHT: 62 IN | TEMPERATURE: 98 F

## 2019-09-10 DIAGNOSIS — M21.371 ACQUIRED RIGHT FOOT DROP: ICD-10-CM

## 2019-09-10 DIAGNOSIS — Z86.61 HISTORY OF ENCEPHALITIS: ICD-10-CM

## 2019-09-10 DIAGNOSIS — F41.1 GAD (GENERALIZED ANXIETY DISORDER): Primary | ICD-10-CM

## 2019-09-10 DIAGNOSIS — G80.9 CEREBRAL PALSY, UNSPECIFIED TYPE: ICD-10-CM

## 2019-09-10 PROCEDURE — 99214 PR OFFICE/OUTPT VISIT, EST, LEVL IV, 30-39 MIN: ICD-10-PCS | Mod: S$PBB,,, | Performed by: FAMILY MEDICINE

## 2019-09-10 PROCEDURE — 99214 OFFICE O/P EST MOD 30 MIN: CPT | Mod: S$PBB,,, | Performed by: FAMILY MEDICINE

## 2019-09-10 PROCEDURE — 99214 OFFICE O/P EST MOD 30 MIN: CPT | Mod: PBBFAC,PN | Performed by: FAMILY MEDICINE

## 2019-09-10 PROCEDURE — 99999 PR PBB SHADOW E&M-EST. PATIENT-LVL IV: CPT | Mod: PBBFAC,,, | Performed by: FAMILY MEDICINE

## 2019-09-10 PROCEDURE — 99999 PR PBB SHADOW E&M-EST. PATIENT-LVL IV: ICD-10-PCS | Mod: PBBFAC,,, | Performed by: FAMILY MEDICINE

## 2019-09-10 RX ORDER — ESCITALOPRAM OXALATE 10 MG/1
10 TABLET ORAL DAILY
Qty: 30 TABLET | Refills: 5 | Status: SHIPPED | OUTPATIENT
Start: 2019-09-10 | End: 2019-12-11

## 2019-09-10 NOTE — PROGRESS NOTES
"Subjective:       Patient ID: Julissa Singletary is a 68 y.o. female.    Chief Complaint: Fall (says she fell in the park about 6 weeks ago and now she gets anxiety when she has to walk )    Has always tried to walk for exercise, enjoyed walking at the park.  However, approximately 6 weeks ago she tripped and fell, though fortunately no significant injury.  However, since then, she has been nervous about the risk of recurrent fall and injury, to the point that she is avoiding walking.  Has been very anxious and irritable.  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  Has always been able to compensate for this, but lately feels like she is a little more unsteady with her gait.  Denies suicidal or homicidal ideation    Review of Systems   Constitutional: Negative for fever.   Respiratory: Negative for shortness of breath.    Cardiovascular: Negative for chest pain.   Musculoskeletal: Positive for gait problem.   Neurological: Positive for weakness.   Psychiatric/Behavioral: Positive for dysphoric mood. Negative for agitation, confusion, self-injury and suicidal ideas. The patient is nervous/anxious.        Objective:      Vitals:    09/10/19 1158 09/10/19 1230   BP: (!) 148/90 132/82   BP Location: Right arm Left arm   Patient Position: Sitting Sitting   BP Method: Large (Manual) Large (Automatic)   Pulse: 78    Resp: 18    Temp: 98.1 °F (36.7 °C)    TempSrc: Oral    SpO2: 98%    Weight: 83.9 kg (185 lb)    Height: 5' 2" (1.575 m)      Physical Exam   Constitutional: She is oriented to person, place, and time. She appears well-developed and well-nourished.   HENT:   Head: Normocephalic and atraumatic.   Cardiovascular: Normal rate, regular rhythm and normal heart sounds.   Pulmonary/Chest: Effort normal and breath sounds normal.   Musculoskeletal: She exhibits no edema.   Neurological: She is alert and oriented to person, place, " and time.   Spastic right-sided hemiparesis with right footdrop   Skin: Skin is warm and dry.   Nursing note and vitals reviewed.      Assessment:       1. OLI (generalized anxiety disorder)    2. Cerebral palsy, unspecified type    3. Acquired right foot drop    4. History of encephalitis        Plan:       OLI (generalized anxiety disorder)  -     escitalopram oxalate (LEXAPRO) 10 MG tablet; Take 1 tablet (10 mg total) by mouth once daily.  Dispense: 30 tablet; Refill: 5    Cerebral palsy, unspecified type  -     Ambulatory Referral to Physical/Occupational Therapy  Might ultimately benefit from orthotics/braces.  Has used in the past, but not in many years  Acquired right foot drop  -     Ambulatory Referral to Physical/Occupational Therapy    History of encephalitis      Medication List with Changes/Refills   New Medications    ESCITALOPRAM OXALATE (LEXAPRO) 10 MG TABLET    Take 1 tablet (10 mg total) by mouth once daily.   Current Medications    ATORVASTATIN (LIPITOR) 20 MG TABLET    TAKE 1 TABLET BY MOUTH ONCE DAILY    CARVEDILOL (COREG) 6.25 MG TABLET    TAKE 1 TABLET(6.25 MG) BY MOUTH TWICE DAILY WITH MEALS    LORAZEPAM (ATIVAN) 1 MG TABLET    Take 1 tablet (1 mg total) by mouth nightly as needed (insomnia).    MELATONIN 5 MG TAB    Take 1 tablet by mouth nightly as needed.    TURMERIC ORAL    Take 1 tablet by mouth once daily.    VALSARTAN-HYDROCHLOROTHIAZIDE (DIOVAN-HCT) 320-25 MG PER TABLET    Take 1 tablet by mouth once daily.

## 2019-10-07 DIAGNOSIS — M81.0 OSTEOPOROSIS WITHOUT CURRENT PATHOLOGICAL FRACTURE, UNSPECIFIED OSTEOPOROSIS TYPE: ICD-10-CM

## 2019-10-07 RX ORDER — ALENDRONATE SODIUM 70 MG/1
TABLET ORAL
Qty: 12 TABLET | Refills: 3 | Status: SHIPPED | OUTPATIENT
Start: 2019-10-07 | End: 2021-02-02

## 2019-10-11 RX ORDER — LORAZEPAM 1 MG/1
TABLET ORAL
Qty: 30 TABLET | Refills: 1 | Status: SHIPPED | OUTPATIENT
Start: 2019-10-11 | End: 2021-02-02

## 2019-12-11 ENCOUNTER — OFFICE VISIT (OUTPATIENT)
Dept: PRIMARY CARE CLINIC | Facility: CLINIC | Age: 69
End: 2019-12-11
Payer: MEDICARE

## 2019-12-11 VITALS
HEIGHT: 62 IN | RESPIRATION RATE: 18 BRPM | SYSTOLIC BLOOD PRESSURE: 108 MMHG | DIASTOLIC BLOOD PRESSURE: 64 MMHG | TEMPERATURE: 98 F | WEIGHT: 200 LBS | OXYGEN SATURATION: 97 % | BODY MASS INDEX: 36.8 KG/M2 | HEART RATE: 60 BPM

## 2019-12-11 DIAGNOSIS — Z23 NEED FOR VACCINATION: ICD-10-CM

## 2019-12-11 DIAGNOSIS — F41.1 GAD (GENERALIZED ANXIETY DISORDER): Primary | ICD-10-CM

## 2019-12-11 DIAGNOSIS — M21.371 ACQUIRED RIGHT FOOT DROP: ICD-10-CM

## 2019-12-11 DIAGNOSIS — G80.9 CEREBRAL PALSY, UNSPECIFIED TYPE: ICD-10-CM

## 2019-12-11 PROCEDURE — 99214 OFFICE O/P EST MOD 30 MIN: CPT | Mod: S$PBB,,, | Performed by: FAMILY MEDICINE

## 2019-12-11 PROCEDURE — 99999 PR PBB SHADOW E&M-EST. PATIENT-LVL III: ICD-10-PCS | Mod: PBBFAC,,, | Performed by: FAMILY MEDICINE

## 2019-12-11 PROCEDURE — 99999 PR PBB SHADOW E&M-EST. PATIENT-LVL III: CPT | Mod: PBBFAC,,, | Performed by: FAMILY MEDICINE

## 2019-12-11 PROCEDURE — 1126F AMNT PAIN NOTED NONE PRSNT: CPT | Mod: ,,, | Performed by: FAMILY MEDICINE

## 2019-12-11 PROCEDURE — 99214 PR OFFICE/OUTPT VISIT, EST, LEVL IV, 30-39 MIN: ICD-10-PCS | Mod: S$PBB,,, | Performed by: FAMILY MEDICINE

## 2019-12-11 PROCEDURE — 99213 OFFICE O/P EST LOW 20 MIN: CPT | Mod: PBBFAC,PN,25 | Performed by: FAMILY MEDICINE

## 2019-12-11 PROCEDURE — 1159F PR MEDICATION LIST DOCUMENTED IN MEDICAL RECORD: ICD-10-PCS | Mod: ,,, | Performed by: FAMILY MEDICINE

## 2019-12-11 PROCEDURE — 1159F MED LIST DOCD IN RCRD: CPT | Mod: ,,, | Performed by: FAMILY MEDICINE

## 2019-12-11 PROCEDURE — 1126F PR PAIN SEVERITY QUANTIFIED, NO PAIN PRESENT: ICD-10-PCS | Mod: ,,, | Performed by: FAMILY MEDICINE

## 2019-12-11 PROCEDURE — G0009 ADMIN PNEUMOCOCCAL VACCINE: HCPCS | Mod: PBBFAC,PN

## 2019-12-11 RX ORDER — ESCITALOPRAM OXALATE 20 MG/1
20 TABLET ORAL DAILY
Qty: 90 TABLET | Refills: 3 | Status: SHIPPED | OUTPATIENT
Start: 2019-12-11 | End: 2021-01-26

## 2019-12-11 NOTE — PROGRESS NOTES
Verified pt ID using name and . NKDA stated by patient. Administered Pneunovax 23  in Left deltoid as  per physician order using aseptic technique. Pt tolerated well with no adverse reactions noted.

## 2019-12-11 NOTE — PROGRESS NOTES
"Subjective:       Patient ID: Julissa Singletary is a 69 y.o. female.    Chief Complaint: Follow-up (says she is here to follow up after doing rehab for her foot )    Anxiety - better overall since starting lexapro, but doesn't think it's strong enough. Still scared about falling when she is away from home, but has not actually fallen. Using cane when she leaves the house. No SI/HI  Completed therapy for right foot drop, feels like she made good improvements. Left shoulder pain at times, but better overall    Review of Systems   Constitutional: Negative for fever and unexpected weight change.   Respiratory: Negative for shortness of breath.    Cardiovascular: Negative for chest pain.   Musculoskeletal: Positive for arthralgias and gait problem.   Skin: Negative for rash and wound.   Psychiatric/Behavioral: Negative for self-injury and suicidal ideas. The patient is nervous/anxious.        Objective:      Vitals:    12/11/19 1402   BP: 108/64   BP Location: Left arm   Patient Position: Sitting   BP Method: Large (Manual)   Pulse: 60   Resp: 18   Temp: 98.2 °F (36.8 °C)   TempSrc: Oral   SpO2: 97%   Weight: 90.7 kg (200 lb)   Height: 5' 2" (1.575 m)     Physical Exam   Constitutional: She is oriented to person, place, and time. She appears well-developed and well-nourished.   HENT:   Head: Normocephalic and atraumatic.   Cardiovascular: Normal rate, regular rhythm and normal heart sounds.   Pulmonary/Chest: Effort normal and breath sounds normal.   Musculoskeletal: She exhibits no edema.   Neurological: She is alert and oriented to person, place, and time.   Spastic right-sided hemiparesis with right footdrop   Skin: Skin is warm and dry.   Psychiatric: She has a normal mood and affect. Her behavior is normal. Judgment and thought content normal.   Nursing note and vitals reviewed.      Lab Results   Component Value Date    WBC 8.10 10/18/2018    HGB 13.1 10/18/2018    HCT 38.4 10/18/2018     10/18/2018    " CHOL 170 10/18/2018    TRIG 181 (H) 10/18/2018    HDL 72 10/18/2018    ALT 19 10/18/2018    AST 26 10/18/2018     10/18/2018    K 4.0 10/18/2018    CL 99 (L) 10/18/2018    CREATININE 0.9 10/18/2018    BUN 21 10/18/2018    CO2 29 10/18/2018    TSH 4.1 (H) 12/06/2004    INR 1.0 12/15/2004      Assessment:       1. OLI (generalized anxiety disorder)    2. Need for vaccination    3. Cerebral palsy, unspecified type    4. Acquired right foot drop        Plan:       OLI (generalized anxiety disorder)  -     escitalopram oxalate (LEXAPRO) 20 MG tablet; Take 1 tablet (20 mg total) by mouth once daily.  Dispense: 90 tablet; Refill: 3  Better, but still not adequately controlled  Need for vaccination  -     Pneumococcal Polysaccharide Vaccine (23 Valent) (SQ/IM)    Cerebral palsy, unspecified type    Acquired right foot drop  Continue home exercises    Medication List with Changes/Refills   Current Medications    ALENDRONATE (FOSAMAX) 70 MG TABLET    TAKE ONE TABLET BY MOUTH EVERY 7 DAYS    ATORVASTATIN (LIPITOR) 20 MG TABLET    TAKE 1 TABLET BY MOUTH ONCE DAILY    CARVEDILOL (COREG) 6.25 MG TABLET    TAKE 1 TABLET(6.25 MG) BY MOUTH TWICE DAILY WITH MEALS    LORAZEPAM (ATIVAN) 1 MG TABLET    TAKE 1 TABLET BY MOUTH EVERY NIGHT AT BEDTIME AS NEEDED FOR INSOMNIA    MELATONIN 5 MG TAB    Take 1 tablet by mouth nightly as needed.    TURMERIC ORAL    Take 1 tablet by mouth once daily.    VALSARTAN-HYDROCHLOROTHIAZIDE (DIOVAN-HCT) 320-25 MG PER TABLET    Take 1 tablet by mouth once daily.   Changed and/or Refilled Medications    Modified Medication Previous Medication    ESCITALOPRAM OXALATE (LEXAPRO) 20 MG TABLET escitalopram oxalate (LEXAPRO) 10 MG tablet       Take 1 tablet (20 mg total) by mouth once daily.    Take 1 tablet (10 mg total) by mouth once daily.

## 2020-01-28 DIAGNOSIS — I10 ESSENTIAL HYPERTENSION: ICD-10-CM

## 2020-01-28 RX ORDER — CARVEDILOL 6.25 MG/1
TABLET ORAL
Qty: 180 TABLET | Refills: 3 | Status: SHIPPED | OUTPATIENT
Start: 2020-01-28 | End: 2021-01-15 | Stop reason: SDUPTHER

## 2020-02-06 DIAGNOSIS — I10 ESSENTIAL HYPERTENSION: ICD-10-CM

## 2020-03-09 ENCOUNTER — OFFICE VISIT (OUTPATIENT)
Dept: PRIMARY CARE CLINIC | Facility: CLINIC | Age: 70
End: 2020-03-09
Payer: MEDICARE

## 2020-03-09 VITALS
BODY MASS INDEX: 35.94 KG/M2 | SYSTOLIC BLOOD PRESSURE: 136 MMHG | RESPIRATION RATE: 18 BRPM | HEART RATE: 66 BPM | OXYGEN SATURATION: 96 % | TEMPERATURE: 99 F | HEIGHT: 62 IN | DIASTOLIC BLOOD PRESSURE: 78 MMHG | WEIGHT: 195.31 LBS

## 2020-03-09 DIAGNOSIS — J01.10 ACUTE NON-RECURRENT FRONTAL SINUSITIS: Primary | ICD-10-CM

## 2020-03-09 PROCEDURE — 99999 PR PBB SHADOW E&M-EST. PATIENT-LVL IV: CPT | Mod: PBBFAC,,, | Performed by: NURSE PRACTITIONER

## 2020-03-09 PROCEDURE — 99213 PR OFFICE/OUTPT VISIT, EST, LEVL III, 20-29 MIN: ICD-10-PCS | Mod: S$PBB,,, | Performed by: NURSE PRACTITIONER

## 2020-03-09 PROCEDURE — 99999 PR PBB SHADOW E&M-EST. PATIENT-LVL IV: ICD-10-PCS | Mod: PBBFAC,,, | Performed by: NURSE PRACTITIONER

## 2020-03-09 PROCEDURE — 99214 OFFICE O/P EST MOD 30 MIN: CPT | Mod: PBBFAC,PN,25 | Performed by: NURSE PRACTITIONER

## 2020-03-09 PROCEDURE — 96372 THER/PROPH/DIAG INJ SC/IM: CPT | Mod: PBBFAC,PN

## 2020-03-09 PROCEDURE — 99213 OFFICE O/P EST LOW 20 MIN: CPT | Mod: S$PBB,,, | Performed by: NURSE PRACTITIONER

## 2020-03-09 RX ORDER — TRIAMCINOLONE ACETONIDE 40 MG/ML
80 INJECTION, SUSPENSION INTRA-ARTICULAR; INTRAMUSCULAR
Status: COMPLETED | OUTPATIENT
Start: 2020-03-09 | End: 2020-03-09

## 2020-03-09 RX ORDER — PROMETHAZINE HYDROCHLORIDE AND DEXTROMETHORPHAN HYDROBROMIDE 6.25; 15 MG/5ML; MG/5ML
5 SYRUP ORAL
Qty: 118 ML | Refills: 0 | Status: SHIPPED | OUTPATIENT
Start: 2020-03-09 | End: 2020-08-13

## 2020-03-09 RX ORDER — AMOXICILLIN AND CLAVULANATE POTASSIUM 875; 125 MG/1; MG/1
1 TABLET, FILM COATED ORAL 2 TIMES DAILY
Qty: 14 TABLET | Refills: 0 | Status: SHIPPED | OUTPATIENT
Start: 2020-03-09 | End: 2020-08-13

## 2020-03-09 RX ADMIN — TRIAMCINOLONE ACETONIDE 80 MG: 40 INJECTION, SUSPENSION INTRA-ARTICULAR; INTRAMUSCULAR at 11:03

## 2020-03-09 NOTE — PROGRESS NOTES
Chief Complaint  Chief Complaint   Patient presents with    Bronchitis     phelm coming up       HPI    Julissa Singletary is a 69 y.o. female that presents for cough.    Patient reports onset of bronchitis symptoms on Tuesday approximately 1 week ago. Cough. Cough is productive, green. Wheezing. Shortness of breath. No nasal congestion. No ear pain. No headaches. No nausea, vomiting, diarrhea. No fever or chills. Patient with a h/o asthma. Not currently using inhalers at this time.            PAST MEDICAL HISTORY:  Past Medical History:   Diagnosis Date    Essential hypertension 10/9/2017       PAST SURGICAL HISTORY:  Past Surgical History:   Procedure Laterality Date    HYSTERECTOMY         SOCIAL HISTORY:  Social History     Socioeconomic History    Marital status:      Spouse name: Not on file    Number of children: Not on file    Years of education: Not on file    Highest education level: Not on file   Occupational History    Not on file   Social Needs    Financial resource strain: Not on file    Food insecurity:     Worry: Not on file     Inability: Not on file    Transportation needs:     Medical: Not on file     Non-medical: Not on file   Tobacco Use    Smoking status: Never Smoker    Smokeless tobacco: Never Used   Substance and Sexual Activity    Alcohol use: Yes    Drug use: No    Sexual activity: Yes   Lifestyle    Physical activity:     Days per week: Not on file     Minutes per session: Not on file    Stress: Not on file   Relationships    Social connections:     Talks on phone: Not on file     Gets together: Not on file     Attends [employer] service: Not on file     Active member of club or organization: Not on file     Attends meetings of clubs or organizations: Not on file     Relationship status: Not on file   Other Topics Concern    Not on file   Social History Narrative    Not on file       FAMILY HISTORY:  Family History   Problem Relation Age of Onset    Diabetes  Mother     No Known Problems Father     No Known Problems Sister     No Known Problems Brother     No Known Problems Maternal Aunt     No Known Problems Maternal Uncle     No Known Problems Paternal Aunt     No Known Problems Paternal Uncle     No Known Problems Maternal Grandmother     No Known Problems Maternal Grandfather     No Known Problems Paternal Grandmother     No Known Problems Paternal Grandfather     Amblyopia Neg Hx     Blindness Neg Hx     Cancer Neg Hx     Cataracts Neg Hx     Glaucoma Neg Hx     Hypertension Neg Hx     Macular degeneration Neg Hx     Retinal detachment Neg Hx     Strabismus Neg Hx     Stroke Neg Hx     Thyroid disease Neg Hx        ALLERGIES AND MEDICATIONS: updated and reviewed.  Review of patient's allergies indicates:  No Known Allergies  Current Outpatient Medications   Medication Sig Dispense Refill    alendronate (FOSAMAX) 70 MG tablet TAKE ONE TABLET BY MOUTH EVERY 7 DAYS 12 tablet 3    atorvastatin (LIPITOR) 20 MG tablet TAKE 1 TABLET BY MOUTH ONCE DAILY 90 tablet 3    carvedilol (COREG) 6.25 MG tablet TAKE 1 TABLET(6.25 MG) BY MOUTH TWICE DAILY WITH MEALS 180 tablet 3    escitalopram oxalate (LEXAPRO) 20 MG tablet Take 1 tablet (20 mg total) by mouth once daily. 90 tablet 3    LORazepam (ATIVAN) 1 MG tablet TAKE 1 TABLET BY MOUTH EVERY NIGHT AT BEDTIME AS NEEDED FOR INSOMNIA 30 tablet 1    melatonin 5 mg Tab Take 1 tablet by mouth nightly as needed.      TURMERIC ORAL Take 1 tablet by mouth once daily.      valsartan-hydrochlorothiazide (DIOVAN-HCT) 320-25 mg per tablet Take 1 tablet by mouth once daily. 90 tablet 1    amoxicillin-clavulanate 875-125mg (AUGMENTIN) 875-125 mg per tablet Take 1 tablet by mouth 2 (two) times daily. 14 tablet 0    promethazine-dextromethorphan (PROMETHAZINE-DM) 6.25-15 mg/5 mL Syrp Take 5 mLs by mouth every 4 to 6 hours as needed. 118 mL 0     Current Facility-Administered Medications   Medication Dose Route  "Frequency Provider Last Rate Last Dose    triamcinolone acetonide injection 80 mg  80 mg Intramuscular 1 time in [facility]  Review of Systems   Constitutional: Positive for fatigue. Negative for chills and fever.   HENT: Positive for congestion. Negative for ear pain, postnasal drip, sinus pain and sore throat.    Respiratory: Positive for cough, chest tightness, shortness of breath and wheezing.    Cardiovascular: Negative for chest pain.   Gastrointestinal: Negative for diarrhea, nausea and vomiting.   Psychiatric/Behavioral: Positive for sleep disturbance.           PHYSICAL EXAM  Vitals:    03/09/20 1040 03/09/20 1054   BP: (!) 144/74 136/78   BP Location: Left arm Right arm   Patient Position: Sitting Sitting   BP Method: Large (Manual) Medium (Manual)   Pulse: 66    Resp: 18    Temp: 98.8 °F (37.1 °C)    TempSrc: Oral    SpO2: 96%    Weight: 88.6 kg (195 lb 5.2 oz)    Height: 5' 2" (1.575 m)     Body mass index is 35.73 kg/m².  Weight: 88.6 kg (195 lb 5.2 oz)   Height: 5' 2" (157.5 cm)     Physical Exam   Constitutional: She is oriented to person, place, and time. She appears well-developed and well-nourished.   HENT:   Head: Normocephalic.   Right Ear: Tympanic membrane normal.   Left Ear: Tympanic membrane normal.   Mouth/Throat: Uvula is midline, oropharynx is clear and moist and mucous membranes are normal.   Eyes: Conjunctivae are normal.   Cardiovascular: Normal rate, regular rhythm, normal heart sounds and normal pulses.   No murmur heard.  Pulses:       Radial pulses are 2+ on the right side, and 2+ on the left side.   No LE swelling noted   Pulmonary/Chest: Effort normal and breath sounds normal. She has no wheezes.   Breath sounds clear with no noted wheezing or rhonchi.   Abdominal: Soft. Bowel sounds are normal. There is no tenderness.   Musculoskeletal: She exhibits no edema.   Lymphadenopathy:     She has no cervical adenopathy.   Neurological: She is alert " and oriented to person, place, and time.   Skin: Skin is warm and dry. No rash noted.   Psychiatric: She has a normal mood and affect.         Health Maintenance       Date Due Completion Date    Shingles Vaccine (2 of 2) 10/11/2019 8/16/2019    Mammogram 10/18/2020 10/18/2018    DEXA SCAN 10/18/2021 10/18/2018    Colonoscopy 10/03/2023 10/3/2013 (Done)    Override on 10/3/2013: Done    Lipid Panel 10/18/2023 10/18/2018    TETANUS VACCINE 07/29/2029 7/29/2019            Assessment & Plan    Problem List Items Addressed This Visit     None      Visit Diagnoses     Acute non-recurrent frontal sinusitis    -  Primary    Relevant Medications    triamcinolone acetonide injection 80 mg (Start on 3/9/2020 11:00 AM)    amoxicillin-clavulanate 875-125mg (AUGMENTIN) 875-125 mg per tablet    promethazine-dextromethorphan (PROMETHAZINE-DM) 6.25-15 mg/5 mL Syrp          Follow-up: Follow up if symptoms worsen or fail to improve, for follow up with Dr. Lowery.    Tanna Mckeon    Medication List with Changes/Refills   New Medications    AMOXICILLIN-CLAVULANATE 875-125MG (AUGMENTIN) 875-125 MG PER TABLET    Take 1 tablet by mouth 2 (two) times daily.    PROMETHAZINE-DEXTROMETHORPHAN (PROMETHAZINE-DM) 6.25-15 MG/5 ML SYRP    Take 5 mLs by mouth every 4 to 6 hours as needed.   Current Medications    ALENDRONATE (FOSAMAX) 70 MG TABLET    TAKE ONE TABLET BY MOUTH EVERY 7 DAYS    ATORVASTATIN (LIPITOR) 20 MG TABLET    TAKE 1 TABLET BY MOUTH ONCE DAILY    CARVEDILOL (COREG) 6.25 MG TABLET    TAKE 1 TABLET(6.25 MG) BY MOUTH TWICE DAILY WITH MEALS    ESCITALOPRAM OXALATE (LEXAPRO) 20 MG TABLET    Take 1 tablet (20 mg total) by mouth once daily.    LORAZEPAM (ATIVAN) 1 MG TABLET    TAKE 1 TABLET BY MOUTH EVERY NIGHT AT BEDTIME AS NEEDED FOR INSOMNIA    MELATONIN 5 MG TAB    Take 1 tablet by mouth nightly as needed.    TURMERIC ORAL    Take 1 tablet by mouth once daily.    VALSARTAN-HYDROCHLOROTHIAZIDE (DIOVAN-HCT) 320-25 MG PER TABLET     Take 1 tablet by mouth once daily.

## 2020-03-09 NOTE — PROGRESS NOTES
Patient identified by name and date of birth, denies any allergies, injection administered as ordered, by aseptic technique, tolerated well by pt.

## 2020-05-18 RX ORDER — VALSARTAN AND HYDROCHLOROTHIAZIDE 320; 25 MG/1; MG/1
TABLET, FILM COATED ORAL
Qty: 90 TABLET | Refills: 1 | Status: SHIPPED | OUTPATIENT
Start: 2020-05-18 | End: 2020-11-30

## 2020-06-09 ENCOUNTER — LAB VISIT (OUTPATIENT)
Dept: PRIMARY CARE CLINIC | Facility: OTHER | Age: 70
End: 2020-06-09
Payer: MEDICARE

## 2020-06-09 DIAGNOSIS — Z03.818 ENCOUNTER FOR OBSERVATION FOR SUSPECTED EXPOSURE TO OTHER BIOLOGICAL AGENTS RULED OUT: ICD-10-CM

## 2020-06-09 LAB — SARS-COV-2 RNA RESP QL NAA+PROBE: NOT DETECTED

## 2020-06-09 PROCEDURE — U0003 INFECTIOUS AGENT DETECTION BY NUCLEIC ACID (DNA OR RNA); SEVERE ACUTE RESPIRATORY SYNDROME CORONAVIRUS 2 (SARS-COV-2) (CORONAVIRUS DISEASE [COVID-19]), AMPLIFIED PROBE TECHNIQUE, MAKING USE OF HIGH THROUGHPUT TECHNOLOGIES AS DESCRIBED BY CMS-2020-01-R: HCPCS

## 2020-06-22 ENCOUNTER — TELEPHONE (OUTPATIENT)
Dept: PRIMARY CARE CLINIC | Facility: CLINIC | Age: 70
End: 2020-06-22

## 2020-06-22 NOTE — TELEPHONE ENCOUNTER
----- Message from Rocio Michelle sent at 6/22/2020 12:23 PM CDT -----  Regarding: Registered letter.  Contact: Patient [phone]  Patient is calling about registered letter she received about Mammo.  Requesting call back.

## 2020-06-22 NOTE — TELEPHONE ENCOUNTER
Pt notified that the PCP will review the mammogram and determine if she needs a repeat screening, if she does, she will not be charged for this. Stated understanding

## 2020-06-29 ENCOUNTER — TELEPHONE (OUTPATIENT)
Dept: PRIMARY CARE CLINIC | Facility: CLINIC | Age: 70
End: 2020-06-29

## 2020-06-29 DIAGNOSIS — Z12.31 SCREENING MAMMOGRAM, ENCOUNTER FOR: Primary | ICD-10-CM

## 2020-06-29 RX ORDER — ATORVASTATIN CALCIUM 20 MG/1
TABLET, FILM COATED ORAL
Qty: 90 TABLET | Refills: 3 | Status: SHIPPED | OUTPATIENT
Start: 2020-06-29 | End: 2021-08-17

## 2020-06-29 NOTE — TELEPHONE ENCOUNTER
Patient notified that Dr. Lowery would recommend her repeat her mammogram. She states understanding and number given for her to call to schedule.

## 2020-06-29 NOTE — TELEPHONE ENCOUNTER
Called for pt, no answer. LM on VM to return call   Pt needs to be given Viviana Torres's information to be set up for repeat mammogram

## 2020-06-29 NOTE — TELEPHONE ENCOUNTER
----- Message from Era Ambrosio sent at 6/29/2020  3:37 PM CDT -----  Contact: Self   Patient is returning a phone call.  Who left a message for the patient: office  Does patient know what this is regarding:  mammo  Comments:

## 2020-06-29 NOTE — TELEPHONE ENCOUNTER
----- Message from Kelli Wang LPN sent at 6/29/2020 10:26 AM CDT -----  Regarding: FW: Registered letter.  Contact: Patient [phone]    ----- Message -----  From: Rocio Michelle  Sent: 6/22/2020  12:23 PM CDT  To: [facility] Staff  Subject: Registered letter.                               Patient is calling about registered letter she received about Mammo.  Requesting call back.

## 2020-07-21 ENCOUNTER — HOSPITAL ENCOUNTER (OUTPATIENT)
Dept: RADIOLOGY | Facility: HOSPITAL | Age: 70
Discharge: HOME | End: 2020-07-21
Attending: FAMILY MEDICINE
Payer: MEDICARE

## 2020-07-21 DIAGNOSIS — Z12.31 SCREENING MAMMOGRAM, ENCOUNTER FOR: ICD-10-CM

## 2020-07-21 PROCEDURE — 77067 MAMMO DIGITAL SCREENING BILAT WITH TOMOSYNTHESIS_CAD: ICD-10-PCS | Mod: 26,,, | Performed by: RADIOLOGY

## 2020-07-21 PROCEDURE — 77063 MAMMO DIGITAL SCREENING BILAT WITH TOMOSYNTHESIS_CAD: ICD-10-PCS | Mod: 26,,, | Performed by: RADIOLOGY

## 2020-07-21 PROCEDURE — 77067 SCR MAMMO BI INCL CAD: CPT | Mod: TC

## 2020-07-21 PROCEDURE — 77063 BREAST TOMOSYNTHESIS BI: CPT | Mod: 26,,, | Performed by: RADIOLOGY

## 2020-07-21 PROCEDURE — 77067 SCR MAMMO BI INCL CAD: CPT | Mod: 26,,, | Performed by: RADIOLOGY

## 2020-07-23 ENCOUNTER — TELEPHONE (OUTPATIENT)
Dept: RADIOLOGY | Facility: HOSPITAL | Age: 70
End: 2020-07-23

## 2020-07-23 NOTE — TELEPHONE ENCOUNTER
Spoke with patient and explained mammogram findings.Patient expressed understanding of results. Patient scheduled abnormal mammogram follow up appointment at [facility] on 7/28/2020.

## 2020-07-24 DIAGNOSIS — R92.8 ABNORMAL MAMMOGRAM: Primary | ICD-10-CM

## 2020-07-27 ENCOUNTER — TELEPHONE (OUTPATIENT)
Dept: PRIMARY CARE CLINIC | Facility: CLINIC | Age: 70
End: 2020-07-27

## 2020-07-27 NOTE — TELEPHONE ENCOUNTER
Patient notified Focal asymmetry noted to right breast at the upper inner position. I would like to order a diagnostic mammogram and US for further evaluation. She says she has an appointment tomorrow

## 2020-07-28 ENCOUNTER — HOSPITAL ENCOUNTER (OUTPATIENT)
Dept: RADIOLOGY | Facility: HOSPITAL | Age: 70
Discharge: HOME | End: 2020-07-28
Attending: FAMILY MEDICINE
Payer: MEDICARE

## 2020-07-28 DIAGNOSIS — R92.8 ABNORMAL MAMMOGRAM: ICD-10-CM

## 2020-07-28 PROCEDURE — 77065 DX MAMMO INCL CAD UNI: CPT | Mod: TC,PO

## 2020-07-28 PROCEDURE — 77065 MAMMO DIGITAL DIAGNOSTIC RIGHT WITH TOMOSYNTHESIS_CAD: ICD-10-PCS | Mod: 26,,, | Performed by: RADIOLOGY

## 2020-07-28 PROCEDURE — 77061 MAMMO DIGITAL DIAGNOSTIC RIGHT WITH TOMOSYNTHESIS_CAD: ICD-10-PCS | Mod: 26,,, | Performed by: RADIOLOGY

## 2020-07-28 PROCEDURE — 77061 BREAST TOMOSYNTHESIS UNI: CPT | Mod: 26,,, | Performed by: RADIOLOGY

## 2020-07-28 PROCEDURE — 76642 US BREAST RIGHT LIMITED: ICD-10-PCS | Mod: 26,RT,, | Performed by: RADIOLOGY

## 2020-07-28 PROCEDURE — 76642 ULTRASOUND BREAST LIMITED: CPT | Mod: 26,RT,, | Performed by: RADIOLOGY

## 2020-07-28 PROCEDURE — 77065 DX MAMMO INCL CAD UNI: CPT | Mod: 26,,, | Performed by: RADIOLOGY

## 2020-07-28 PROCEDURE — 76642 ULTRASOUND BREAST LIMITED: CPT | Mod: TC,PO,RT

## 2020-07-28 PROCEDURE — 77061 BREAST TOMOSYNTHESIS UNI: CPT | Mod: TC,PO

## 2020-07-29 ENCOUNTER — TELEPHONE (OUTPATIENT)
Dept: RADIOLOGY | Facility: HOSPITAL | Age: 70
End: 2020-07-29

## 2020-07-29 NOTE — TELEPHONE ENCOUNTER
Spoke with patient. Reviewed breast biopsy procedure and reviewed instructions for breast biopsy. Patient expressed understanding and all questions were answered. Provided patient with my phone number to call for any further concerns or questions.   Patient scheduled breast biopsy at the [facility] on 8/7/2020.

## 2020-07-31 ENCOUNTER — TELEPHONE (OUTPATIENT)
Dept: PRIMARY CARE CLINIC | Facility: CLINIC | Age: 70
End: 2020-07-31

## 2020-07-31 NOTE — TELEPHONE ENCOUNTER
Patient says she had the diagnostic mammogram and u/s done. She has the biopsy scheduled for next Friday.

## 2020-07-31 NOTE — TELEPHONE ENCOUNTER
----- Message from Dee Kaba sent at 7/31/2020 11:14 AM CDT -----  Contact: Patient  Type:  Patient Returning Call    Who Called:  Julissa, patient  Who Left Message for Patient:  Ifeanyi  Does the patient know what this is regarding?:  Breast ultrasound  Best Call Back Number:  [phone]  Additional Information:  Missed your call, yes she is scheduled for the biopsy. Thanks.

## 2020-08-07 ENCOUNTER — HOSPITAL ENCOUNTER (OUTPATIENT)
Dept: RADIOLOGY | Facility: HOSPITAL | Age: 70
Discharge: HOME | End: 2020-08-07
Attending: FAMILY MEDICINE
Payer: MEDICARE

## 2020-08-07 DIAGNOSIS — R92.8 ABNORMAL MAMMOGRAM: ICD-10-CM

## 2020-08-07 PROCEDURE — 88305 TISSUE EXAM BY PATHOLOGIST: CPT | Performed by: PATHOLOGY

## 2020-08-07 PROCEDURE — 88342 IMHCHEM/IMCYTCHM 1ST ANTB: CPT | Mod: 59 | Performed by: PATHOLOGY

## 2020-08-07 PROCEDURE — 77065 DX MAMMO INCL CAD UNI: CPT | Mod: TC,PO,RT

## 2020-08-07 PROCEDURE — 88342 CHG IMMUNOCYTOCHEMISTRY: ICD-10-PCS | Mod: 26,59,, | Performed by: PATHOLOGY

## 2020-08-07 PROCEDURE — 88360 TUMOR IMMUNOHISTOCHEM/MANUAL: CPT | Performed by: PATHOLOGY

## 2020-08-07 PROCEDURE — 77065 MAMMO DIGITAL DIAGNOSTIC RIGHT WITH CAD: ICD-10-PCS | Mod: 26,RT,, | Performed by: RADIOLOGY

## 2020-08-07 PROCEDURE — 19083 US BREAST BIOPSY WITH IMAGING 1ST SITE RIGHT: ICD-10-PCS | Mod: RT,,, | Performed by: RADIOLOGY

## 2020-08-07 PROCEDURE — 25000003 PHARM REV CODE 250: Mod: PO | Performed by: FAMILY MEDICINE

## 2020-08-07 PROCEDURE — 77065 DX MAMMO INCL CAD UNI: CPT | Mod: 26,RT,, | Performed by: RADIOLOGY

## 2020-08-07 PROCEDURE — 88360 TUMOR IMMUNOHISTOCHEM/MANUAL: CPT | Mod: 26,,, | Performed by: PATHOLOGY

## 2020-08-07 PROCEDURE — 88305 TISSUE EXAM BY PATHOLOGIST: ICD-10-PCS | Mod: 26,,, | Performed by: PATHOLOGY

## 2020-08-07 PROCEDURE — 19083 BX BREAST 1ST LESION US IMAG: CPT | Mod: TC,PO,RT

## 2020-08-07 PROCEDURE — 19083 BX BREAST 1ST LESION US IMAG: CPT | Mod: RT,,, | Performed by: RADIOLOGY

## 2020-08-07 PROCEDURE — 88342 IMHCHEM/IMCYTCHM 1ST ANTB: CPT | Mod: 26,59,, | Performed by: PATHOLOGY

## 2020-08-07 PROCEDURE — 88360 PR  TUMOR IMMUNOHISTOCHEM/MANUAL: ICD-10-PCS | Mod: 26,,, | Performed by: PATHOLOGY

## 2020-08-07 PROCEDURE — 88305 TISSUE EXAM BY PATHOLOGIST: CPT | Mod: 26,,, | Performed by: PATHOLOGY

## 2020-08-07 PROCEDURE — 88377 M/PHMTRC ALYS ISHQUANT/SEMIQ: CPT | Performed by: PATHOLOGY

## 2020-08-07 RX ORDER — LIDOCAINE HYDROCHLORIDE 10 MG/ML
3 INJECTION, SOLUTION EPIDURAL; INFILTRATION; INTRACAUDAL; PERINEURAL ONCE
Status: COMPLETED | OUTPATIENT
Start: 2020-08-07 | End: 2020-08-07

## 2020-08-07 RX ORDER — LIDOCAINE HYDROCHLORIDE AND EPINEPHRINE 20; 10 MG/ML; UG/ML
10 INJECTION, SOLUTION INFILTRATION; PERINEURAL ONCE
Status: COMPLETED | OUTPATIENT
Start: 2020-08-07 | End: 2020-08-07

## 2020-08-07 RX ADMIN — LIDOCAINE HYDROCHLORIDE,EPINEPHRINE BITARTRATE 10 ML: 20; .01 INJECTION, SOLUTION INFILTRATION; PERINEURAL at 01:08

## 2020-08-07 RX ADMIN — LIDOCAINE HYDROCHLORIDE 3 ML: 10 INJECTION, SOLUTION EPIDURAL; INFILTRATION; INTRACAUDAL; PERINEURAL at 01:08

## 2020-08-12 ENCOUNTER — DOCUMENTATION ONLY (OUTPATIENT)
Dept: SURGERY | Facility: CLINIC | Age: 70
End: 2020-08-12

## 2020-08-12 NOTE — NURSING
Called Patient with results of breast biopsy from 8/7/2020.  Explained that the biopsy showed Invasive ductal carcinoma . Discussed what this means and that the next step is to meet with a breast surgeon. An appt was made for 8/13/2020 with Dr. Haro.  Reviewed location of breast center. Patient verbalized understanding. Offered to e mail appt and educational information to patient.Patient stated she does not have e mail. Discussed that patient will receive information at the appt. Patient verbalized understanding  Oncology Navigation   Intake  Date of Diagnosis: 08/07/20  Cancer Type: Breast  MD Assigned: Dr. Haro  Internal / External Referral: Internal  Initial Nurse Navigator Contact: 08/12/20  Diagnosis to Initial Contact Timeline (days): 5 days  Contact Method: Phone  Date Worked: 08/12/20  First Appointment Available: 08/13/20  Appointment Date: 08/13/20  Schedule to Appointment Timeline (days): 1  First Available Date vs. Scheduled Date (days): 0     Treatment              ER: Positive  NC: Positive     Acuity      Follow Up  No follow-ups on file.

## 2020-08-13 ENCOUNTER — OFFICE VISIT (OUTPATIENT)
Dept: SURGERY | Facility: CLINIC | Age: 70
End: 2020-08-13
Payer: MEDICARE

## 2020-08-13 VITALS
BODY MASS INDEX: 36.92 KG/M2 | HEART RATE: 74 BPM | DIASTOLIC BLOOD PRESSURE: 58 MMHG | HEIGHT: 62 IN | WEIGHT: 200.63 LBS | SYSTOLIC BLOOD PRESSURE: 121 MMHG

## 2020-08-13 DIAGNOSIS — C50.911 INVASIVE DUCTAL CARCINOMA OF BREAST, FEMALE, RIGHT: ICD-10-CM

## 2020-08-13 DIAGNOSIS — Z01.818 PRE-OP TESTING: Primary | ICD-10-CM

## 2020-08-13 PROCEDURE — 99204 PR OFFICE/OUTPT VISIT, NEW, LEVL IV, 45-59 MIN: ICD-10-PCS | Mod: S$PBB,,, | Performed by: SURGERY

## 2020-08-13 PROCEDURE — 99213 OFFICE O/P EST LOW 20 MIN: CPT | Mod: PBBFAC | Performed by: SURGERY

## 2020-08-13 PROCEDURE — 99999 PR PBB SHADOW E&M-EST. PATIENT-LVL III: ICD-10-PCS | Mod: PBBFAC,,, | Performed by: SURGERY

## 2020-08-13 PROCEDURE — 99999 PR PBB SHADOW E&M-EST. PATIENT-LVL III: CPT | Mod: PBBFAC,,, | Performed by: SURGERY

## 2020-08-13 PROCEDURE — 99204 OFFICE O/P NEW MOD 45 MIN: CPT | Mod: S$PBB,,, | Performed by: SURGERY

## 2020-08-13 NOTE — H&P (VIEW-ONLY)
New Breast Cancer  History and Physical  [facility]    REFERRING PROVIDER: Dean Lowery MD  [address]  [address]  [place] [address]    CHIEF COMPLAINT: right breast cancer    Subjective:      Julissa Singletary is a 69 y.o. postmenopausal female referred for evaluation of recently diagnosed carcinoma of the right breast. The patient was initially referred for surgical evaluation of an abnormal mammogram first noted 20. Follow-up imaging showed an irregular high density mass with indistinct margins at 1 o'clock in the right breast. A ultrasound guided biopsy was performed on 20 with pathology revealing infiltrating ductal carcinoma of the breast.     Patient does not routinely do self breast exams.   Patient denies a personal history of breast cancer. Patient denies any smoking history and reports about 6 alcoholic drinks weekly (2 drinks ~3 times per week).     Findings at that time were the following:   Tumor size: 0.6 cm on US (1cm on MMG)   Tumor ndgndrndanddndend:nd nd2nd Estrogen Receptor: Strongly positive (100%)   Progesterone Receptor: Strongly positive (100%)  Her-2 duncan: Unequivocal, FISH pending  Lymph node status: Lymph node was not biopsed, no abnormal node identified on US       GYN History:  Age of menarche was 12. Age of menopause was 37 due to hysterectomy.  Patient admits to hormonal therapy for one year following hysterectomy as well as approximately 10 years of oral contraceptive use prior. Patient is . Age of first live birth was 25. Patient did not breast feed.    FAMILY History:  Patient denies any family history of breast or ovarian cancer. Reports father had prostate cancer in his 60's.    Past Medical History:   Diagnosis Date    Essential hypertension 10/9/2017     Past Surgical History:   Procedure Laterality Date    HYSTERECTOMY       Current Outpatient Medications on File Prior to Visit   Medication Sig Dispense Refill    alendronate (FOSAMAX) 70 MG  tablet TAKE ONE TABLET BY MOUTH EVERY 7 DAYS 12 tablet 3    amoxicillin-clavulanate 875-125mg (AUGMENTIN) 875-125 mg per tablet Take 1 tablet by mouth 2 (two) times daily. 14 tablet 0    atorvastatin (LIPITOR) 20 MG tablet TAKE 1 TABLET BY MOUTH ONCE DAILY 90 tablet 3    carvedilol (COREG) 6.25 MG tablet TAKE 1 TABLET(6.25 MG) BY MOUTH TWICE DAILY WITH MEALS 180 tablet 3    escitalopram oxalate (LEXAPRO) 20 MG tablet Take 1 tablet (20 mg total) by mouth once daily. 90 tablet 3    LORazepam (ATIVAN) 1 MG tablet TAKE 1 TABLET BY MOUTH EVERY NIGHT AT BEDTIME AS NEEDED FOR INSOMNIA 30 tablet 1    melatonin 5 mg Tab Take 1 tablet by mouth nightly as needed.      promethazine-dextromethorphan (PROMETHAZINE-DM) 6.25-15 mg/5 mL Syrp Take 5 mLs by mouth every 4 to 6 hours as needed. 118 mL 0    TURMERIC ORAL Take 1 tablet by mouth once daily.      valsartan-hydrochlorothiazide (DIOVAN-HCT) 320-25 mg per tablet TAKE 1 TABLET BY MOUTH EVERY DAY 90 tablet 1     No current facility-administered medications on file prior to visit.      Social History     Socioeconomic History    Marital status:      Spouse name: Not on file    Number of children: Not on file    Years of education: Not on file    Highest education level: Not on file   Occupational History    Not on file   Social Needs    Financial resource strain: Not on file    Food insecurity     Worry: Not on file     Inability: Not on file    Transportation needs     Medical: Not on file     Non-medical: Not on file   Tobacco Use    Smoking status: Never Smoker    Smokeless tobacco: Never Used   Substance and Sexual Activity    Alcohol use: Yes    Drug use: No    Sexual activity: Yes   Lifestyle    Physical activity     Days per week: Not on file     Minutes per session: Not on file    Stress: Not on file   Relationships    Social connections     Talks on phone: Not on file     Gets together: Not on file     Attends Protestant service: Not on  file     Active member of club or organization: Not on file     Attends meetings of clubs or organizations: Not on file     Relationship status: Not on file   Other Topics Concern    Not on file   Social History Narrative    Not on file     Family History   Problem Relation Age of Onset    Diabetes Mother     No Known Problems Father     No Known Problems Sister     No Known Problems Brother     No Known Problems Maternal Aunt     No Known Problems Maternal Uncle     No Known Problems Paternal Aunt     No Known Problems Paternal Uncle     No Known Problems Maternal Grandmother     No Known Problems Maternal Grandfather     No Known Problems Paternal Grandmother     No Known Problems Paternal Grandfather     Amblyopia Neg Hx     Blindness Neg Hx     Cancer Neg Hx     Cataracts Neg Hx     Glaucoma Neg Hx     Hypertension Neg Hx     Macular degeneration Neg Hx     Retinal detachment Neg Hx     Strabismus Neg Hx     Stroke Neg Hx     Thyroid disease Neg Hx         Review of Systems  Review of Systems   Constitutional: Negative.    Respiratory: Negative.    Cardiovascular: Negative.    Genitourinary: Negative.    Neurological: Positive for numbness.        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        Objective:   PHYSICAL EXAM:    Physical Exam   Pulmonary/Chest:   No visible or palpable abnormalities of the bilateral breasts, with the exception of residual bruising from recent right breast biopsy. No palpable lympadenopathy of either axilla or of the supra/infraclavicular nodes.             Radiology review:     7/21/20 Screening mammogram: focal asymmetry in right upper inner quadrant, posterior depth.    7/28/20 Diagnostic mammogram: irregular 1cm high density mass with indistinct margins in right upper inner quadrant.   Ultrasound: irregular hypoechoic mass with indistinct margins measuring 6mm, with posterior acoustic shadowing and rim  hypervascularity. No axillary lymphadenopathy.    8/7/20 Ultrasound guided core biopsy: invasive ductal carcinoma, grade 1 (T=3, N=1, M=1), with greatest linear dimension of 8mm. ER+ (100%), NJ+ (100), HER2 equivocal (score 2+) with FISH pending.        Assessment:      Julissa Singletary is a 69 y.o. postmenopausal female with recently diagnosed infiltrating ductal carcinoma of the right breast.      Plan:    Options for management were discussed with the patient and her family. We reviewed the existing data noting the equivalency of breast conserving surgery with radiation therapy and mastectomy. We also reviewed the guidelines of the National Comprehensive Cancer Network for Stage I breast carcinoma. We discussed the need for lumpectomy margins to be negative for carcinoma, the necessity for postoperative radiation therapy after breast conservation in most cases, the possibility of a failed or false negative sentinel lymph node biopsy and the potential need for complete lymphadenectomy for a failed or positive sentinel lymph node biopsy were fully discussed. In the setting of mastectomy, delayed or immediate reconstruction options are available and were discussed.     Ms. Singletary would prefer breast conservation.    In the setting of lumpectomy, radiation therapy would be recommended majority of the time.  The duration and treatment side effects were discussed with the patient.  This will coordinated with the radiation oncologist pending final pathology.    We also discussed the role of systemic therapy in the treatment of early stage breast cancer.  We discussed that this is based on tumor biology and brandon status and will be determined based on final pathology.  We discussed that if the cancer is hormone positive, endocrine therapy would be recommended in most cases and its use can reduce the risk of recurrence as well as improve survival. Side effects of treatment were briefly discussed. We also discussed  the potential role for chemotherapy based on a number of factors such as tumor phenotype (ER+ vs. triple negative vs. Bsl4dru+) and this would be determined in coordination with the medical oncologist.    Patient was educated on breast cancer, receptors, wire localization lumpectomy, mastectomy, sentinel lymph node mapping and biopsy, axillary lymph node dissection, reconstruction, breast prosthesis with post-mastectomy bra and radiation therapy. Patient was given patient information binder including [facility] breast cancer treatment brochure.  All her questions were answered.     I have personally taken the history and examined this patient, and I agree with the history, physical exam, assessment, and plan as written and outlined and stated above per the medical student.    The patient presents with her  Jed for a new consultation regarding newly diagnosed right invasive ductal carcinoma of the right breast upper inner quadrant.  She presented with with a finding of a focal asymmetric density on screening mammogram on July 21st.  Subsequent diagnostic mammogram and ultrasound revealed approximately a 1 cm irregular mass with indistinct margins with hypoechoic features in the right upper inner quadrant of the right breast at the 1 o'clock position approximately 9 cm from the nipple.  She has no suspicious lymphadenopathy on imaging or by clinical exam.  Core needle biopsy revealed invasive ductal carcinoma that was both strongly 100% estrogen and progesterone receptor positive and HER2 Rojelio equivocal with fish currently pending.  Ki-67 was 20-30%.  The tumor was grade 1 and it least 8 mm in size on the core needle biopsy from 08/07/2020.  Family history is negative and gyn history is as noted above.  She states she has about 1-1/2 ovaries and at the time of her hysterectomy at age 37 they took out 1-1/2 ovaries and left a small remnant of 1 of them.  She took hormone replacement therapy for about 1 year but is  currently not on any hormone replacement therapy.    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    Assessment and plan:    The patient strongly desires breast conservation surgery.  We discussed the technique and indications and rationale for breast conservation surgery and indications for breast radiotherapy following lumpectomy.  Options of breast conservation surgery and radiotherapy versus mastectomy were discussed.  We briefly discussed reconstruction options.  She is very highly motivated for attempted breast conservation surgery.  I think she is an excellent candidate for this given the 1 cm mass in the right upper inner quadrant.    We will order genetic testing.  She is scheduled for a right reflector localized partial mastectomy/lumpectomy for breast conservation surgery attempt with sentinel lymph node biopsy plus or minus a Port-A-Cath placement based on the HER2 Rojelio fish final results.    I told her that if she is HER2 Rojeilo positive then she would be recommended for adjuvant Herceptin based therapy and the length of that would be based on the final lymph nodes status.  She understands this and hopefully her fish for her HER2 Rojelio status will be back by the time of surgery.  Surgery has been scheduled for [facility] on Friday 08/28/2020  I have asked my staff to follow up on the fish results so that we will hopefully make sure it is back prior to surgery.    Addendum: The Her 2neu FISH was negative so we will not place a port at the time of surgery on 8-28-20.

## 2020-08-13 NOTE — H&P (VIEW-ONLY)
New Breast Cancer  History and Physical  [facility]    REFERRING PROVIDER: Dean Lowery MD  [address]  [address]  [place] [address]    CHIEF COMPLAINT: right breast cancer    Subjective:      Julisas Singletary is a 69 y.o. postmenopausal female referred for evaluation of recently diagnosed carcinoma of the right breast. The patient was initially referred for surgical evaluation of an abnormal mammogram first noted 20. Follow-up imaging showed an irregular high density mass with indistinct margins at 1 o'clock in the right breast. A ultrasound guided biopsy was performed on 20 with pathology revealing infiltrating ductal carcinoma of the breast.     Patient does not routinely do self breast exams.   Patient denies a personal history of breast cancer. Patient denies any smoking history and reports about 6 alcoholic drinks weekly (2 drinks ~3 times per week).     Findings at that time were the following:   Tumor size: 0.6 cm on US (1cm on MMG)   Tumor ndgndrndanddndend:nd nd2nd Estrogen Receptor: Strongly positive (100%)   Progesterone Receptor: Strongly positive (100%)  Her-2 duncan: Unequivocal, FISH pending  Lymph node status: Lymph node was not biopsed, no abnormal node identified on US       GYN History:  Age of menarche was 12. Age of menopause was 37 due to hysterectomy.  Patient admits to hormonal therapy for one year following hysterectomy as well as approximately 10 years of oral contraceptive use prior. Patient is . Age of first live birth was 25. Patient did not breast feed.    FAMILY History:  Patient denies any family history of breast or ovarian cancer. Reports father had prostate cancer in his 60's.    Past Medical History:   Diagnosis Date    Essential hypertension 10/9/2017     Past Surgical History:   Procedure Laterality Date    HYSTERECTOMY       Current Outpatient Medications on File Prior to Visit   Medication Sig Dispense Refill    alendronate (FOSAMAX) 70 MG  tablet TAKE ONE TABLET BY MOUTH EVERY 7 DAYS 12 tablet 3    amoxicillin-clavulanate 875-125mg (AUGMENTIN) 875-125 mg per tablet Take 1 tablet by mouth 2 (two) times daily. 14 tablet 0    atorvastatin (LIPITOR) 20 MG tablet TAKE 1 TABLET BY MOUTH ONCE DAILY 90 tablet 3    carvedilol (COREG) 6.25 MG tablet TAKE 1 TABLET(6.25 MG) BY MOUTH TWICE DAILY WITH MEALS 180 tablet 3    escitalopram oxalate (LEXAPRO) 20 MG tablet Take 1 tablet (20 mg total) by mouth once daily. 90 tablet 3    LORazepam (ATIVAN) 1 MG tablet TAKE 1 TABLET BY MOUTH EVERY NIGHT AT BEDTIME AS NEEDED FOR INSOMNIA 30 tablet 1    melatonin 5 mg Tab Take 1 tablet by mouth nightly as needed.      promethazine-dextromethorphan (PROMETHAZINE-DM) 6.25-15 mg/5 mL Syrp Take 5 mLs by mouth every 4 to 6 hours as needed. 118 mL 0    TURMERIC ORAL Take 1 tablet by mouth once daily.      valsartan-hydrochlorothiazide (DIOVAN-HCT) 320-25 mg per tablet TAKE 1 TABLET BY MOUTH EVERY DAY 90 tablet 1     No current facility-administered medications on file prior to visit.      Social History     Socioeconomic History    Marital status:      Spouse name: Not on file    Number of children: Not on file    Years of education: Not on file    Highest education level: Not on file   Occupational History    Not on file   Social Needs    Financial resource strain: Not on file    Food insecurity     Worry: Not on file     Inability: Not on file    Transportation needs     Medical: Not on file     Non-medical: Not on file   Tobacco Use    Smoking status: Never Smoker    Smokeless tobacco: Never Used   Substance and Sexual Activity    Alcohol use: Yes    Drug use: No    Sexual activity: Yes   Lifestyle    Physical activity     Days per week: Not on file     Minutes per session: Not on file    Stress: Not on file   Relationships    Social connections     Talks on phone: Not on file     Gets together: Not on file     Attends Islam service: Not on  file     Active member of club or organization: Not on file     Attends meetings of clubs or organizations: Not on file     Relationship status: Not on file   Other Topics Concern    Not on file   Social History Narrative    Not on file     Family History   Problem Relation Age of Onset    Diabetes Mother     No Known Problems Father     No Known Problems Sister     No Known Problems Brother     No Known Problems Maternal Aunt     No Known Problems Maternal Uncle     No Known Problems Paternal Aunt     No Known Problems Paternal Uncle     No Known Problems Maternal Grandmother     No Known Problems Maternal Grandfather     No Known Problems Paternal Grandmother     No Known Problems Paternal Grandfather     Amblyopia Neg Hx     Blindness Neg Hx     Cancer Neg Hx     Cataracts Neg Hx     Glaucoma Neg Hx     Hypertension Neg Hx     Macular degeneration Neg Hx     Retinal detachment Neg Hx     Strabismus Neg Hx     Stroke Neg Hx     Thyroid disease Neg Hx         Review of Systems  Review of Systems   Constitutional: Negative.    Respiratory: Negative.    Cardiovascular: Negative.    Genitourinary: Negative.    Neurological: Positive for numbness.        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        Objective:   PHYSICAL EXAM:    Physical Exam   Pulmonary/Chest:   No visible or palpable abnormalities of the bilateral breasts, with the exception of residual bruising from recent right breast biopsy. No palpable lympadenopathy of either axilla or of the supra/infraclavicular nodes.             Radiology review:     7/21/20 Screening mammogram: focal asymmetry in right upper inner quadrant, posterior depth.    7/28/20 Diagnostic mammogram: irregular 1cm high density mass with indistinct margins in right upper inner quadrant.   Ultrasound: irregular hypoechoic mass with indistinct margins measuring 6mm, with posterior acoustic shadowing and rim  hypervascularity. No axillary lymphadenopathy.    8/7/20 Ultrasound guided core biopsy: invasive ductal carcinoma, grade 1 (T=3, N=1, M=1), with greatest linear dimension of 8mm. ER+ (100%), AL+ (100), HER2 equivocal (score 2+) with FISH pending.        Assessment:      Julissa Singletary is a 69 y.o. postmenopausal female with recently diagnosed infiltrating ductal carcinoma of the right breast.      Plan:    Options for management were discussed with the patient and her family. We reviewed the existing data noting the equivalency of breast conserving surgery with radiation therapy and mastectomy. We also reviewed the guidelines of the National Comprehensive Cancer Network for Stage I breast carcinoma. We discussed the need for lumpectomy margins to be negative for carcinoma, the necessity for postoperative radiation therapy after breast conservation in most cases, the possibility of a failed or false negative sentinel lymph node biopsy and the potential need for complete lymphadenectomy for a failed or positive sentinel lymph node biopsy were fully discussed. In the setting of mastectomy, delayed or immediate reconstruction options are available and were discussed.     Ms. Singletary would prefer breast conservation.    In the setting of lumpectomy, radiation therapy would be recommended majority of the time.  The duration and treatment side effects were discussed with the patient.  This will coordinated with the radiation oncologist pending final pathology.    We also discussed the role of systemic therapy in the treatment of early stage breast cancer.  We discussed that this is based on tumor biology and brandon status and will be determined based on final pathology.  We discussed that if the cancer is hormone positive, endocrine therapy would be recommended in most cases and its use can reduce the risk of recurrence as well as improve survival. Side effects of treatment were briefly discussed. We also discussed  the potential role for chemotherapy based on a number of factors such as tumor phenotype (ER+ vs. triple negative vs. [id number]+) and this would be determined in coordination with the medical oncologist.    Patient was educated on breast cancer, receptors, wire localization lumpectomy, mastectomy, sentinel lymph node mapping and biopsy, axillary lymph node dissection, reconstruction, breast prosthesis with post-mastectomy bra and radiation therapy. Patient was given patient information binder including [facility] breast cancer treatment brochure.  All her questions were answered.     I have personally taken the history and examined this patient, and I agree with the history, physical exam, assessment, and plan as written and outlined and stated above per the medical student.    The patient presents with her  Jed for a new consultation regarding newly diagnosed right invasive ductal carcinoma of the right breast upper inner quadrant.  She presented with with a finding of a focal asymmetric density on screening mammogram on July 21st.  Subsequent diagnostic mammogram and ultrasound revealed approximately a 1 cm irregular mass with indistinct margins with hypoechoic features in the right upper inner quadrant of the right breast at the 1 o'clock position approximately 9 cm from the nipple.  She has no suspicious lymphadenopathy on imaging or by clinical exam.  Core needle biopsy revealed invasive ductal carcinoma that was both strongly 100% estrogen and progesterone receptor positive and HER2 Rojelio equivocal with fish currently pending.  Ki-67 was 20-30%.  The tumor was grade 1 and it least 8 mm in size on the core needle biopsy from 08/07/2020.  Family history is negative and gyn history is as noted above.  She states she has about 1-1/2 ovaries and at the time of her hysterectomy at age 37 they took out 1-1/2 ovaries and left a small remnant of 1 of them.  She took hormone replacement therapy for about 1 year but is  currently not on any hormone replacement therapy.    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    Assessment and plan:    The patient strongly desires breast conservation surgery.  We discussed the technique and indications and rationale for breast conservation surgery and indications for breast radiotherapy following lumpectomy.  Options of breast conservation surgery and radiotherapy versus mastectomy were discussed.  We briefly discussed reconstruction options.  She is very highly motivated for attempted breast conservation surgery.  I think she is an excellent candidate for this given the 1 cm mass in the right upper inner quadrant.    We will order genetic testing.  She is scheduled for a right reflector localized partial mastectomy/lumpectomy for breast conservation surgery attempt with sentinel lymph node biopsy plus or minus a Port-A-Cath placement based on the HER2 Rojelio fish final results.    I told her that if she is HER2 Rojelio positive then she would be recommended for adjuvant Herceptin based therapy and the length of that would be based on the final lymph nodes status.  She understands this and hopefully her fish for her HER2 Rojelio status will be back by the time of surgery.  Surgery has been scheduled for [facility] on Friday 08/28/2020  I have asked my staff to follow up on the fish results so that we will hopefully make sure it is back prior to surgery.    Addendum: The Her 2neu FISH was negative so we will not place a port at the time of surgery on 8-28-20.

## 2020-08-13 NOTE — PROGRESS NOTES
New Breast Cancer  History and Physical  [facility]    REFERRING PROVIDER: Dean Lowery MD  [address]  [address]  [place] [address]    CHIEF COMPLAINT: right breast cancer    Subjective:      Julissa Singletary is a 69 y.o. postmenopausal female referred for evaluation of recently diagnosed carcinoma of the right breast. The patient was initially referred for surgical evaluation of an abnormal mammogram first noted 20. Follow-up imaging showed an irregular high density mass with indistinct margins at 1 o'clock in the right breast. A ultrasound guided biopsy was performed on 20 with pathology revealing infiltrating ductal carcinoma of the breast.     Patient does not routinely do self breast exams.   Patient denies a personal history of breast cancer. Patient denies any smoking history and reports about 6 alcoholic drinks weekly (2 drinks ~3 times per week).     Findings at that time were the following:   Tumor size: 0.6 cm on US (1cm on MMG)   Tumor ndgndrndanddndend:nd nd2nd Estrogen Receptor: Strongly positive (100%)   Progesterone Receptor: Strongly positive (100%)  Her-2 duncan: Unequivocal, FISH pending  Lymph node status: Lymph node was not biopsed, no abnormal node identified on US       GYN History:  Age of menarche was 12. Age of menopause was 37 due to hysterectomy.  Patient admits to hormonal therapy for one year following hysterectomy as well as approximately 10 years of oral contraceptive use prior. Patient is . Age of first live birth was 25. Patient did not breast feed.    FAMILY History:  Patient denies any family history of breast or ovarian cancer. Reports father had prostate cancer in his 60's.    Past Medical History:   Diagnosis Date    Essential hypertension 10/9/2017     Past Surgical History:   Procedure Laterality Date    HYSTERECTOMY       Current Outpatient Medications on File Prior to Visit   Medication Sig Dispense Refill    alendronate (FOSAMAX) 70 MG  tablet TAKE ONE TABLET BY MOUTH EVERY 7 DAYS 12 tablet 3    amoxicillin-clavulanate 875-125mg (AUGMENTIN) 875-125 mg per tablet Take 1 tablet by mouth 2 (two) times daily. 14 tablet 0    atorvastatin (LIPITOR) 20 MG tablet TAKE 1 TABLET BY MOUTH ONCE DAILY 90 tablet 3    carvedilol (COREG) 6.25 MG tablet TAKE 1 TABLET(6.25 MG) BY MOUTH TWICE DAILY WITH MEALS 180 tablet 3    escitalopram oxalate (LEXAPRO) 20 MG tablet Take 1 tablet (20 mg total) by mouth once daily. 90 tablet 3    LORazepam (ATIVAN) 1 MG tablet TAKE 1 TABLET BY MOUTH EVERY NIGHT AT BEDTIME AS NEEDED FOR INSOMNIA 30 tablet 1    melatonin 5 mg Tab Take 1 tablet by mouth nightly as needed.      promethazine-dextromethorphan (PROMETHAZINE-DM) 6.25-15 mg/5 mL Syrp Take 5 mLs by mouth every 4 to 6 hours as needed. 118 mL 0    TURMERIC ORAL Take 1 tablet by mouth once daily.      valsartan-hydrochlorothiazide (DIOVAN-HCT) 320-25 mg per tablet TAKE 1 TABLET BY MOUTH EVERY DAY 90 tablet 1     No current facility-administered medications on file prior to visit.      Social History     Socioeconomic History    Marital status:      Spouse name: Not on file    Number of children: Not on file    Years of education: Not on file    Highest education level: Not on file   Occupational History    Not on file   Social Needs    Financial resource strain: Not on file    Food insecurity     Worry: Not on file     Inability: Not on file    Transportation needs     Medical: Not on file     Non-medical: Not on file   Tobacco Use    Smoking status: Never Smoker    Smokeless tobacco: Never Used   Substance and Sexual Activity    Alcohol use: Yes    Drug use: No    Sexual activity: Yes   Lifestyle    Physical activity     Days per week: Not on file     Minutes per session: Not on file    Stress: Not on file   Relationships    Social connections     Talks on phone: Not on file     Gets together: Not on file     Attends Buddhism service: Not on  file     Active member of club or organization: Not on file     Attends meetings of clubs or organizations: Not on file     Relationship status: Not on file   Other Topics Concern    Not on file   Social History Narrative    Not on file     Family History   Problem Relation Age of Onset    Diabetes Mother     No Known Problems Father     No Known Problems Sister     No Known Problems Brother     No Known Problems Maternal Aunt     No Known Problems Maternal Uncle     No Known Problems Paternal Aunt     No Known Problems Paternal Uncle     No Known Problems Maternal Grandmother     No Known Problems Maternal Grandfather     No Known Problems Paternal Grandmother     No Known Problems Paternal Grandfather     Amblyopia Neg Hx     Blindness Neg Hx     Cancer Neg Hx     Cataracts Neg Hx     Glaucoma Neg Hx     Hypertension Neg Hx     Macular degeneration Neg Hx     Retinal detachment Neg Hx     Strabismus Neg Hx     Stroke Neg Hx     Thyroid disease Neg Hx         Review of Systems  Review of Systems   Constitutional: Negative.    Respiratory: Negative.    Cardiovascular: Negative.    Genitourinary: Negative.    Neurological: Positive for numbness.        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        Objective:   PHYSICAL EXAM:    Physical Exam   Pulmonary/Chest:   No visible or palpable abnormalities of the bilateral breasts, with the exception of residual bruising from recent right breast biopsy. No palpable lympadenopathy of either axilla or of the supra/infraclavicular nodes.             Radiology review:     7/21/20 Screening mammogram: focal asymmetry in right upper inner quadrant, posterior depth.    7/28/20 Diagnostic mammogram: irregular 1cm high density mass with indistinct margins in right upper inner quadrant.   Ultrasound: irregular hypoechoic mass with indistinct margins measuring 6mm, with posterior acoustic shadowing and rim  hypervascularity. No axillary lymphadenopathy.    8/7/20 Ultrasound guided core biopsy: invasive ductal carcinoma, grade 1 (T=3, N=1, M=1), with greatest linear dimension of 8mm. ER+ (100%), MA+ (100), HER2 equivocal (score 2+) with FISH pending.        Assessment:      Julissa Singletary is a 69 y.o. postmenopausal female with recently diagnosed infiltrating ductal carcinoma of the right breast.      Plan:    Options for management were discussed with the patient and her family. We reviewed the existing data noting the equivalency of breast conserving surgery with radiation therapy and mastectomy. We also reviewed the guidelines of the National Comprehensive Cancer Network for Stage I breast carcinoma. We discussed the need for lumpectomy margins to be negative for carcinoma, the necessity for postoperative radiation therapy after breast conservation in most cases, the possibility of a failed or false negative sentinel lymph node biopsy and the potential need for complete lymphadenectomy for a failed or positive sentinel lymph node biopsy were fully discussed. In the setting of mastectomy, delayed or immediate reconstruction options are available and were discussed.     Ms. Singletary would prefer breast conservation.    In the setting of lumpectomy, radiation therapy would be recommended majority of the time.  The duration and treatment side effects were discussed with the patient.  This will coordinated with the radiation oncologist pending final pathology.    We also discussed the role of systemic therapy in the treatment of early stage breast cancer.  We discussed that this is based on tumor biology and brandon status and will be determined based on final pathology.  We discussed that if the cancer is hormone positive, endocrine therapy would be recommended in most cases and its use can reduce the risk of recurrence as well as improve survival. Side effects of treatment were briefly discussed. We also discussed  the potential role for chemotherapy based on a number of factors such as tumor phenotype (ER+ vs. triple negative vs. Kit2nqa+) and this would be determined in coordination with the medical oncologist.    Patient was educated on breast cancer, receptors, wire localization lumpectomy, mastectomy, sentinel lymph node mapping and biopsy, axillary lymph node dissection, reconstruction, breast prosthesis with post-mastectomy bra and radiation therapy. Patient was given patient information binder including [facility] breast cancer treatment brochure.  All her questions were answered.     I have personally taken the history and examined this patient, and I agree with the history, physical exam, assessment, and plan as written and outlined and stated above per the medical student.    The patient presents with her  Jed for a new consultation regarding newly diagnosed right invasive ductal carcinoma of the right breast upper inner quadrant.  She presented with with a finding of a focal asymmetric density on screening mammogram on July 21st.  Subsequent diagnostic mammogram and ultrasound revealed approximately a 1 cm irregular mass with indistinct margins with hypoechoic features in the right upper inner quadrant of the right breast at the 1 o'clock position approximately 9 cm from the nipple.  She has no suspicious lymphadenopathy on imaging or by clinical exam.  Core needle biopsy revealed invasive ductal carcinoma that was both strongly 100% estrogen and progesterone receptor positive and HER2 Rojelio equivocal with fish currently pending.  Ki-67 was 20-30%.  The tumor was grade 1 and it least 8 mm in size on the core needle biopsy from 08/07/2020.  Family history is negative and gyn history is as noted above.  She states she has about 1-1/2 ovaries and at the time of her hysterectomy at age 37 they took out 1-1/2 ovaries and left a small remnant of 1 of them.  She took hormone replacement therapy for about 1 year but is  currently not on any hormone replacement therapy.    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    Assessment and plan:    The patient strongly desires breast conservation surgery.  We discussed the technique and indications and rationale for breast conservation surgery and indications for breast radiotherapy following lumpectomy.  Options of breast conservation surgery and radiotherapy versus mastectomy were discussed.  We briefly discussed reconstruction options.  She is very highly motivated for attempted breast conservation surgery.  I think she is an excellent candidate for this given the 1 cm mass in the right upper inner quadrant.    We will order genetic testing.  She is scheduled for a right reflector localized partial mastectomy/lumpectomy for breast conservation surgery attempt with sentinel lymph node biopsy plus or minus a Port-A-Cath placement based on the HER2 Rojelio fish final results.    I told her that if she is HER2 Rojelio positive then she would be recommended for adjuvant Herceptin based therapy and the length of that would be based on the final lymph nodes status.  She understands this and hopefully her fish for her HER2 Rojelio status will be back by the time of surgery.  Surgery has been scheduled for [facility] on Friday 08/28/2020  I have asked my staff to follow up on the fish results so that we will hopefully make sure it is back prior to surgery.    Addendum: The Her 2neu FISH was negative so we will not place a port at the time of surgery on 8-28-20.

## 2020-08-13 NOTE — LETTER
August 15, 2020      Dean Lowery MD  [address]  [address]  [facility] [address]           [facility]  [address]  [place] [address]  Phone: [phone]  Fax: [phone]          Patient: Julissa Singletary   MR Number: [id number]   YOB: 1950   Date of Visit: 8/13/2020       Dear Dr. Dean Lowery:    Thank you for referring Julissa Singletary to me for evaluation. Attached you will find relevant portions of my assessment and plan of care.    If you have questions, please do not hesitate to call me. I look forward to following Julissa Singletary along with you.    Sincerely,    Mike Haro MD    Enclosure  CC:  No Recipients    If you would like to receive this communication electronically, please contact [email or web address] or [phone] to request more information on [employer] Link access.    For providers and/or their staff who would like to refer a patient to [facility], please contact us through our one-stop-shop provider referral line, [facility], at [phone].    If you feel you have received this communication in error or would no longer like to receive these types of communications, please e-mail [email or web address]

## 2020-08-14 DIAGNOSIS — Z17.0 MALIGNANT NEOPLASM OF UPPER-INNER QUADRANT OF RIGHT BREAST IN FEMALE, ESTROGEN RECEPTOR POSITIVE: Primary | ICD-10-CM

## 2020-08-14 DIAGNOSIS — C50.211 MALIGNANT NEOPLASM OF UPPER-INNER QUADRANT OF RIGHT BREAST IN FEMALE, ESTROGEN RECEPTOR POSITIVE: Primary | ICD-10-CM

## 2020-08-15 PROBLEM — C50.911 INVASIVE DUCTAL CARCINOMA OF BREAST, FEMALE, RIGHT: Status: ACTIVE | Noted: 2020-08-15

## 2020-08-19 ENCOUNTER — TELEPHONE (OUTPATIENT)
Dept: SURGERY | Facility: CLINIC | Age: 70
End: 2020-08-19

## 2020-08-19 LAB
FINAL PATHOLOGIC DIAGNOSIS: NORMAL
GROSS: NORMAL
Lab: NORMAL
SUPPLEMENTAL DIAGNOSIS: NORMAL

## 2020-08-19 NOTE — TELEPHONE ENCOUNTER
Spoke with patient, discussed FISH results and that patient will NOT be having a port placed when she has surgery. Reviewed pre op testing scheduled on 8/25/2020. Patient verbalized understanding.     ----- Message from Mike Haro MD sent at 8/19/2020  4:45 PM CDT -----  Corinne, please let her know that her Kyv3gaz FISH result was negative as expected, and we will NOT place a portacath at the time of surgery on 8-29.  I placed an addendum in the note.  Thanks, [facility].  ----- Message -----  From: Corinne E Coniglio, RN  Sent: 8/19/2020   9:15 AM CDT  To: MD Dr. Estrellita Lira,   Her FISH is negative.  Corinne

## 2020-08-24 ENCOUNTER — OFFICE VISIT (OUTPATIENT)
Dept: OPTOMETRY | Facility: CLINIC | Age: 70
End: 2020-08-24
Payer: MEDICARE

## 2020-08-24 ENCOUNTER — LAB VISIT (OUTPATIENT)
Dept: SURGERY | Facility: CLINIC | Age: 70
End: 2020-08-24
Payer: MEDICARE

## 2020-08-24 ENCOUNTER — HOSPITAL ENCOUNTER (OUTPATIENT)
Dept: CARDIOLOGY | Facility: CLINIC | Age: 70
Discharge: HOME | End: 2020-08-24
Payer: MEDICARE

## 2020-08-24 DIAGNOSIS — Z13.5 SCREENING FOR EYE CONDITION: ICD-10-CM

## 2020-08-24 DIAGNOSIS — H52.13 MYOPIA WITH PRESBYOPIA OF BOTH EYES: ICD-10-CM

## 2020-08-24 DIAGNOSIS — H25.13 NUCLEAR SCLEROSIS OF BOTH EYES: Primary | ICD-10-CM

## 2020-08-24 DIAGNOSIS — H43.393 VITREOUS FLOATERS OF BOTH EYES: ICD-10-CM

## 2020-08-24 DIAGNOSIS — H26.9 CORTICAL CATARACT OF BOTH EYES: ICD-10-CM

## 2020-08-24 DIAGNOSIS — Z01.818 PRE-OP TESTING: ICD-10-CM

## 2020-08-24 DIAGNOSIS — H52.4 MYOPIA WITH PRESBYOPIA OF BOTH EYES: ICD-10-CM

## 2020-08-24 PROCEDURE — 92015 DETERMINE REFRACTIVE STATE: CPT | Mod: ,,, | Performed by: OPTOMETRIST

## 2020-08-24 PROCEDURE — 93010 ELECTROCARDIOGRAM REPORT: CPT | Mod: S$PBB,,, | Performed by: INTERNAL MEDICINE

## 2020-08-24 PROCEDURE — 93010 EKG 12-LEAD: ICD-10-PCS | Mod: S$PBB,,, | Performed by: INTERNAL MEDICINE

## 2020-08-24 PROCEDURE — 99999 PR PBB SHADOW E&M-EST. PATIENT-LVL III: CPT | Mod: PBBFAC,,, | Performed by: OPTOMETRIST

## 2020-08-24 PROCEDURE — 99213 OFFICE O/P EST LOW 20 MIN: CPT | Mod: PBBFAC,25 | Performed by: OPTOMETRIST

## 2020-08-24 PROCEDURE — U0003 INFECTIOUS AGENT DETECTION BY NUCLEIC ACID (DNA OR RNA); SEVERE ACUTE RESPIRATORY SYNDROME CORONAVIRUS 2 (SARS-COV-2) (CORONAVIRUS DISEASE [COVID-19]), AMPLIFIED PROBE TECHNIQUE, MAKING USE OF HIGH THROUGHPUT TECHNOLOGIES AS DESCRIBED BY CMS-2020-01-R: HCPCS

## 2020-08-24 PROCEDURE — 92015 PR REFRACTION: ICD-10-PCS | Mod: ,,, | Performed by: OPTOMETRIST

## 2020-08-24 PROCEDURE — 93005 ELECTROCARDIOGRAM TRACING: CPT | Mod: PBBFAC | Performed by: INTERNAL MEDICINE

## 2020-08-24 PROCEDURE — 99999 PR PBB SHADOW E&M-EST. PATIENT-LVL III: ICD-10-PCS | Mod: PBBFAC,,, | Performed by: OPTOMETRIST

## 2020-08-24 PROCEDURE — 92014 PR EYE EXAM, EST PATIENT,COMPREHESV: ICD-10-PCS | Mod: S$PBB,,, | Performed by: OPTOMETRIST

## 2020-08-24 PROCEDURE — 92014 COMPRE OPH EXAM EST PT 1/>: CPT | Mod: S$PBB,,, | Performed by: OPTOMETRIST

## 2020-08-24 NOTE — PROGRESS NOTES
HPI     Annual Exam      Additional comments: General eye exam and refraction.  Pt complains of near VA changes with glasses.  Distance VA satisfactory with glasses   Wears glasses full-time.               Comments     Patient's age: 69 y.o WF  Occupation: Retired  Approximate date of last eye examination:  10/22/2018  Name of last eye doctor seen: Dr Conway  City/State: [facility]  Wears glasses? Yes     If yes, wears  Full-time or part-time?  Full-time  Present glasses are: Bifocal, SV Distance, SV Reading?  Progressive lens  Approximate age of present glasses:  4 + Years old   Got new glasses following last exam, or subsequently?:  No   Any problem with VA with glasses?  Pt c/o near with glasses   Wears CLs?:  No  Headaches?  No  Eye pain/discomfort?  No                                                                                     Flashes?  No  Floaters?  Yes, possible OS   Diplopia/Double vision?  No  Patient's Ocular History:         Any eye surgeries? No         Any eye injury?  No         Any treatment for eye disease?  No  Family history of eye disease?  None  Significant patient medical history:         1. Diabetes?  No   2. HBP?  Yes, controlled by medication and diet              3. Other (describe):  High Cholesterol    ! OTC eyedrops currently using:  OTC artificial tears prn   ! Prescription eye meds currently using:  None   ! Any history of allergy/adverse reaction to any eye meds used   previously?  No    ! Any history of allergy/adverse reaction to eyedrops used during prior   eye exam(s)? No    ! Any history of allergy/adverse reaction to Novacaine or similar meds?   No   ! Any history of allergy/adverse reaction to Epinephrine or similar meds?   No    ! Patient okay with use of anesthetic eyedrops to check eye pressure?    Yes        ! Patient okay with use of eyedrops to dilate pupils today?  Yes   !  Allergies/Medications/Medical History/Family History reviewed today?    Yes      PD =  "61/57  Desired reading distance =  17.25"                                                                        Last edited by Timmy Conway, OD on 8/24/2020  9:10 AM. (History)            Assessment /Plan     For exam results, see Encounter Report.    1. Nuclear sclerosis of both eyes     2. Cortical cataract of both eyes     3. Vitreous floaters of both eyes     4. Screening for eye condition     5. Myopia with presbyopia of both eyes                  Early nuclear sclerosis of lens of both eyes, and early peripheral cortical cataract in both eyes.    Still no need for cataract surgery in either eye, based on the best-corrected VA achieved in each eye with refraction today.     Otherwise, good ocular health in each eye.  Vitreous floaters in both eyes without evidence of retinal etiology.    Myopia (nearsightedness) in each eye.  Presbyopia consistent with age.  New spectacle lens Rx issued for use as desired.  Okay to read without glasses, if desired.  Recheck in 12 -1 8 months - or prior if any problems in the interim.            "

## 2020-08-24 NOTE — PATIENT INSTRUCTIONS
Early nuclear sclerosis of lens of both eyes, and early peripheral cortical cataract in both eyes.    Still no need for cataract surgery in either eye, based on the best-corrected VA achieved in each eye with refraction today.     Otherwise, good ocular health in each eye.  Vitreous floaters in both eyes without evidence of retinal etiology.    Myopia (nearsightedness) in each eye.  Presbyopia consistent with age.  New spectacle lens Rx issued for use as desired.  Okay to read without glasses, if desired.  Recheck in 12 -1 8 months - or prior if any problems in the interim.

## 2020-08-25 LAB — SARS-COV-2 RNA RESP QL NAA+PROBE: NOT DETECTED

## 2020-08-27 ENCOUNTER — ANESTHESIA EVENT (OUTPATIENT)
Dept: SURGERY | Facility: HOSPITAL | Age: 70
End: 2020-08-27
Payer: MEDICARE

## 2020-08-27 RX ORDER — OXYCODONE AND ACETAMINOPHEN 5; 325 MG/1; MG/1
1 TABLET ORAL EVERY 4 HOURS PRN
Qty: 20 TABLET | Refills: 0 | Status: SHIPPED | OUTPATIENT
Start: 2020-08-27 | End: 2021-02-02

## 2020-08-28 ENCOUNTER — ANESTHESIA (OUTPATIENT)
Dept: SURGERY | Facility: HOSPITAL | Age: 70
End: 2020-08-28
Payer: MEDICARE

## 2020-08-28 ENCOUNTER — LAB VISIT (OUTPATIENT)
Dept: SURGERY | Facility: CLINIC | Age: 70
End: 2020-08-28
Payer: MEDICARE

## 2020-08-28 ENCOUNTER — HOSPITAL ENCOUNTER (OUTPATIENT)
Dept: RADIOLOGY | Facility: HOSPITAL | Age: 70
Discharge: HOME | End: 2020-08-28
Attending: SURGERY
Payer: MEDICARE

## 2020-08-28 ENCOUNTER — HOSPITAL ENCOUNTER (OUTPATIENT)
Facility: HOSPITAL | Age: 70
Discharge: HOME | End: 2020-08-28
Attending: SURGERY | Admitting: SURGERY
Payer: MEDICARE

## 2020-08-28 VITALS
WEIGHT: 200 LBS | OXYGEN SATURATION: 96 % | RESPIRATION RATE: 18 BRPM | SYSTOLIC BLOOD PRESSURE: 126 MMHG | BODY MASS INDEX: 36.8 KG/M2 | HEIGHT: 62 IN | DIASTOLIC BLOOD PRESSURE: 74 MMHG | TEMPERATURE: 97 F | HEART RATE: 91 BPM

## 2020-08-28 DIAGNOSIS — Z01.818 PRE-OP TESTING: ICD-10-CM

## 2020-08-28 DIAGNOSIS — Z17.0 MALIGNANT NEOPLASM OF UPPER-INNER QUADRANT OF RIGHT BREAST IN FEMALE, ESTROGEN RECEPTOR POSITIVE: ICD-10-CM

## 2020-08-28 DIAGNOSIS — N63.0 LUMP OR MASS IN BREAST: ICD-10-CM

## 2020-08-28 DIAGNOSIS — Z01.818 PRE-OP TESTING: Primary | ICD-10-CM

## 2020-08-28 DIAGNOSIS — C50.911 BREAST CANCER, RIGHT: Primary | ICD-10-CM

## 2020-08-28 DIAGNOSIS — Z17.0 MALIGNANT NEOPLASM OF UPPER-INNER QUADRANT OF RIGHT BREAST IN FEMALE, ESTROGEN RECEPTOR POSITIVE: Primary | ICD-10-CM

## 2020-08-28 DIAGNOSIS — C50.211 MALIGNANT NEOPLASM OF UPPER-INNER QUADRANT OF RIGHT BREAST IN FEMALE, ESTROGEN RECEPTOR POSITIVE: Primary | ICD-10-CM

## 2020-08-28 DIAGNOSIS — C50.211 MALIGNANT NEOPLASM OF UPPER-INNER QUADRANT OF RIGHT BREAST IN FEMALE, ESTROGEN RECEPTOR POSITIVE: ICD-10-CM

## 2020-08-28 LAB — SARS-COV-2 RDRP RESP QL NAA+PROBE: NEGATIVE

## 2020-08-28 PROCEDURE — 19301 PARTIAL MASTECTOMY: CPT | Mod: RT,73

## 2020-08-28 PROCEDURE — 64461 PVB THORACIC SINGLE INJ SITE: CPT | Performed by: UROLOGY

## 2020-08-28 PROCEDURE — 25000003 PHARM REV CODE 250: Performed by: NURSE ANESTHETIST, CERTIFIED REGISTERED

## 2020-08-28 PROCEDURE — 64462 RIGHT PARAVERTEBRAL SINGLE SHOT: ICD-10-PCS | Mod: 59,RT,, | Performed by: ANESTHESIOLOGY

## 2020-08-28 PROCEDURE — 94761 N-INVAS EAR/PLS OXIMETRY MLT: CPT

## 2020-08-28 PROCEDURE — A9520 TC99 TILMANOCEPT DIAG 0.5MCI: HCPCS

## 2020-08-28 PROCEDURE — 63600175 PHARM REV CODE 636 W HCPCS: Performed by: STUDENT IN AN ORGANIZED HEALTH CARE EDUCATION/TRAINING PROGRAM

## 2020-08-28 PROCEDURE — U0002 COVID-19 LAB TEST NON-CDC: HCPCS

## 2020-08-28 PROCEDURE — 99499 NO LOS: ICD-10-PCS | Mod: ,,, | Performed by: SURGERY

## 2020-08-28 PROCEDURE — D9220A PRA ANESTHESIA: ICD-10-PCS | Mod: CRNA,,, | Performed by: NURSE ANESTHETIST, CERTIFIED REGISTERED

## 2020-08-28 PROCEDURE — 25000003 PHARM REV CODE 250: Performed by: UROLOGY

## 2020-08-28 PROCEDURE — 25000003 PHARM REV CODE 250: Performed by: STUDENT IN AN ORGANIZED HEALTH CARE EDUCATION/TRAINING PROGRAM

## 2020-08-28 PROCEDURE — 64462 PVB THORACIC 2ND+ INJ SITE: CPT | Mod: 59,RT,, | Performed by: ANESTHESIOLOGY

## 2020-08-28 PROCEDURE — D9220A PRA ANESTHESIA: Mod: ANES,,, | Performed by: ANESTHESIOLOGY

## 2020-08-28 PROCEDURE — 99499 UNLISTED E&M SERVICE: CPT | Mod: ,,, | Performed by: SURGERY

## 2020-08-28 PROCEDURE — 64461 RIGHT PARAVERTEBRAL SINGLE SHOT: ICD-10-PCS | Mod: 59,RT,, | Performed by: ANESTHESIOLOGY

## 2020-08-28 PROCEDURE — 64461 PVB THORACIC SINGLE INJ SITE: CPT | Mod: 59,RT,, | Performed by: ANESTHESIOLOGY

## 2020-08-28 PROCEDURE — 99900035 HC TECH TIME PER 15 MIN (STAT)

## 2020-08-28 PROCEDURE — 63600175 PHARM REV CODE 636 W HCPCS: Performed by: NURSE ANESTHETIST, CERTIFIED REGISTERED

## 2020-08-28 PROCEDURE — D9220A PRA ANESTHESIA: ICD-10-PCS | Mod: ANES,,, | Performed by: ANESTHESIOLOGY

## 2020-08-28 PROCEDURE — 94770 HC EXHALED C02 TEST: CPT | Mod: 59

## 2020-08-28 PROCEDURE — S0028 INJECTION, FAMOTIDINE, 20 MG: HCPCS | Performed by: NURSE ANESTHETIST, CERTIFIED REGISTERED

## 2020-08-28 PROCEDURE — D9220A PRA ANESTHESIA: Mod: CRNA,,, | Performed by: NURSE ANESTHETIST, CERTIFIED REGISTERED

## 2020-08-28 RX ORDER — FENTANYL CITRATE 50 UG/ML
25 INJECTION, SOLUTION INTRAMUSCULAR; INTRAVENOUS EVERY 5 MIN PRN
Status: DISCONTINUED | OUTPATIENT
Start: 2020-08-28 | End: 2020-09-03

## 2020-08-28 RX ORDER — BUPIVACAINE HYDROCHLORIDE AND EPINEPHRINE 5; 5 MG/ML; UG/ML
INJECTION, SOLUTION EPIDURAL; INTRACAUDAL; PERINEURAL
Status: DISCONTINUED | OUTPATIENT
Start: 2020-08-28 | End: 2020-08-28 | Stop reason: HOSPADM

## 2020-08-28 RX ORDER — GLYCOPYRROLATE 0.2 MG/ML
INJECTION INTRAMUSCULAR; INTRAVENOUS
Status: DISCONTINUED | OUTPATIENT
Start: 2020-08-28 | End: 2020-08-28 | Stop reason: HOSPADM

## 2020-08-28 RX ORDER — CEFAZOLIN SODIUM 1 G/3ML
2 INJECTION, POWDER, FOR SOLUTION INTRAMUSCULAR; INTRAVENOUS
Status: COMPLETED | OUTPATIENT
Start: 2020-08-28 | End: 2020-09-04

## 2020-08-28 RX ORDER — SODIUM CHLORIDE 0.9 % (FLUSH) 0.9 %
10 SYRINGE (ML) INJECTION
Status: CANCELLED | OUTPATIENT
Start: 2020-08-28

## 2020-08-28 RX ORDER — MIDAZOLAM HYDROCHLORIDE 1 MG/ML
INJECTION, SOLUTION INTRAMUSCULAR; INTRAVENOUS
Status: DISCONTINUED | OUTPATIENT
Start: 2020-08-28 | End: 2020-08-28 | Stop reason: HOSPADM

## 2020-08-28 RX ORDER — FAMOTIDINE 10 MG/ML
INJECTION INTRAVENOUS
Status: DISCONTINUED | OUTPATIENT
Start: 2020-08-28 | End: 2020-08-28 | Stop reason: HOSPADM

## 2020-08-28 RX ORDER — SODIUM CHLORIDE 9 MG/ML
INJECTION, SOLUTION INTRAVENOUS CONTINUOUS
Status: DISCONTINUED | OUTPATIENT
Start: 2020-08-28 | End: 2021-02-02

## 2020-08-28 RX ORDER — MIDAZOLAM HYDROCHLORIDE 1 MG/ML
0.5 INJECTION INTRAMUSCULAR; INTRAVENOUS
Status: DISCONTINUED | OUTPATIENT
Start: 2020-08-28 | End: 2020-09-03

## 2020-08-28 RX ORDER — ACETAMINOPHEN 500 MG
1000 TABLET ORAL ONCE
Status: COMPLETED | OUTPATIENT
Start: 2020-08-28 | End: 2020-08-28

## 2020-08-28 RX ORDER — CELECOXIB 200 MG/1
400 CAPSULE ORAL ONCE
Status: COMPLETED | OUTPATIENT
Start: 2020-08-28 | End: 2020-08-28

## 2020-08-28 RX ADMIN — SODIUM CHLORIDE: 0.9 INJECTION, SOLUTION INTRAVENOUS at 10:08

## 2020-08-28 RX ADMIN — FAMOTIDINE 20 MG: 10 INJECTION, SOLUTION INTRAVENOUS at 11:08

## 2020-08-28 RX ADMIN — GLYCOPYRROLATE 0.2 MG: 0.2 INJECTION, SOLUTION INTRAMUSCULAR; INTRAVENOUS at 11:08

## 2020-08-28 RX ADMIN — MIDAZOLAM HYDROCHLORIDE 1 MG: 1 INJECTION, SOLUTION INTRAMUSCULAR; INTRAVENOUS at 11:08

## 2020-08-28 RX ADMIN — BUPIVACAINE HYDROCHLORIDE AND EPINEPHRINE BITARTRATE 30 ML: 5; .0091 INJECTION, SOLUTION EPIDURAL; INTRACAUDAL; PERINEURAL at 11:08

## 2020-08-28 RX ADMIN — MIDAZOLAM 2 MG: 1 INJECTION INTRAMUSCULAR; INTRAVENOUS at 11:08

## 2020-08-28 RX ADMIN — CELECOXIB 400 MG: 200 CAPSULE ORAL at 10:08

## 2020-08-28 RX ADMIN — FENTANYL CITRATE 50 MCG: 50 INJECTION INTRAMUSCULAR; INTRAVENOUS at 11:08

## 2020-08-28 RX ADMIN — ACETAMINOPHEN 1000 MG: 500 TABLET ORAL at 10:08

## 2020-08-28 NOTE — PROGRESS NOTES
Patient was going to OR and brought back to pre-op 4. Resident states she did not see any imaging with reflectors. Dr. Haro speaking with patient's  and will possibly be rescheduling surgery.

## 2020-08-28 NOTE — PLAN OF CARE
Pt brought back to pre-op room #4 from in-route to OR. Dr Wright at bedside discussing with patient if marker was placed in right breast.

## 2020-08-28 NOTE — PLAN OF CARE
Orthostatic vitals good. Patient did not experience any light-headedness or dizziness. IV removed. Printed discharge instructions given and reviewed with patient and . Covid testing and reflector placement scheduled for Wednesday. Instructed patient and  to contact Dr. Haro's clinic Monday afternoon if the office had not called with appointment time. Surgery re-scheduled for Friday, Sept. 4. Post-op medications had already been delivered. Instructed patient and  not to take these medications before surgery because she will not be approved for more. Patient and  stated understanding. Patient discharged home.

## 2020-08-28 NOTE — DISCHARGE SUMMARY
[facility]  DISCHARGE SUMMARY  General Surgery      Admit Date:  8/28/2020    Discharge Date and Time:  8/28/2020  1:00 PM    Attending Physician:  Mike Haro MD     Discharge Provider:  Shara Wright MD     Reason for Admission:  Right breast cancer    Procedures Performed:  None    Hospital Course:  Please see the preoperative H&P and other available documentation for full details related to history prior to this admission.  Briefly, Julissa Singletary is a 69 y.o. female who was admitted for right breast lumpectomy and sentinel lymph node biopsy for right breast cancer.     She received a pre-operative block as planned the day of surgery. However, on close review of her imaging, there was no reflector placed preoperatively. Dr. Haro discussed with the patient (sedated) and her  that we could not proceed with surgery today. She will undergo reflector placement and repeat COVID test next Wednesday 9/2/20, with surgery to be scheduled for Friday 9/4/20.     Condolences and sincere appologies were provided to the patient and her . We will investigate as to why the reflector did not get placed.     Consults:  None.    Significant Diagnostic Studies:   No results for input(s): WBC, HGB, HCT, PLT, BAND, METAMYELOCYT, MYELOPCT, HGBA1C in the last 72 hours.No results for input(s): NA, K, CL, CO2, BUN, CREATININE, GLU, CALCIUM, CAION, MG, PHOS, AST, ALT, ALKPHOS, BILITOT, BILIDIR, PROT, ALBUMIN, PREALBUMIN, AMYLASE, LIPASE, CRP, HSCRP, SEDRATE, PROCAL in the last 72 hours.No results for input(s): INR, PTT, LABHEPA, LACTATE, TROPONINI, CPK, CPKMB, MB, BNP in the last 72 hours.No results for input(s): PH, PCO2, PO2, HCO3 in the last 72 hours.        Active Hospital Problems    Diagnosis  POA    Breast cancer, right [C50.911]  Yes      Resolved Hospital Problems   No resolved problems to display.       Discharged Condition:  Good    Disposition:  Home or Self  Care    Follow Up/Patient Instructions:     Medications:  Reconciled Home Medications:    Current Discharge Medication List      START taking these medications    Details   oxyCODONE-acetaminophen (PERCOCET) 5-325 mg per tablet Take 1 tablet by mouth every 4 (four) hours as needed for Pain.  Qty: 20 tablet, Refills: 0    Comments: Quantity prescribed more than 7 day supply? No. JEANNIE Bedside delivery, surgery 8/28         CONTINUE these medications which have NOT CHANGED    Details   alendronate (FOSAMAX) 70 MG tablet TAKE ONE TABLET BY MOUTH EVERY 7 DAYS  Qty: 12 tablet, Refills: 3    Associated Diagnoses: Osteoporosis without current pathological fracture, unspecified osteoporosis type      atorvastatin (LIPITOR) 20 MG tablet TAKE 1 TABLET BY MOUTH ONCE DAILY  Qty: 90 tablet, Refills: 3      carvedilol (COREG) 6.25 MG tablet TAKE 1 TABLET(6.25 MG) BY MOUTH TWICE DAILY WITH MEALS  Qty: 180 tablet, Refills: 3    Associated Diagnoses: Essential hypertension      escitalopram oxalate (LEXAPRO) 20 MG tablet Take 1 tablet (20 mg total) by mouth once daily.  Qty: 90 tablet, Refills: 3    Associated Diagnoses: OLI (generalized anxiety disorder)      melatonin 5 mg Tab Take 1 tablet by mouth nightly as needed.      TURMERIC ORAL Take 1 tablet by mouth once daily.      valsartan-hydrochlorothiazide (DIOVAN-HCT) 320-25 mg per tablet TAKE 1 TABLET BY MOUTH EVERY DAY  Qty: 90 tablet, Refills: 1      LORazepam (ATIVAN) 1 MG tablet TAKE 1 TABLET BY MOUTH EVERY NIGHT AT BEDTIME AS NEEDED FOR INSOMNIA  Qty: 30 tablet, Refills: 1           Discharge Procedure Orders   No driving until:   Order Comments: While taking narcotic pain medications.     Notify your health care provider if you experience any of the following:  temperature >100.4     Notify your health care provider if you experience any of the following:  persistent nausea and vomiting or diarrhea     Notify your health care provider if you experience any of the  following:  severe uncontrolled pain     Notify your health care provider if you experience any of the following:  redness, tenderness, or signs of infection (pain, swelling, redness, odor or green/yellow discharge around incision site)     No dressing needed     Activity as tolerated   Order Comments: - Ok to shower in 48 hours (Sunday 8/30). No submersion of surgical wounds (bath, pool, hot tub, etc) for two weeks.  - Wear a supportive bra while awake and asleep for two weeks until your clinic visit     Follow-up Information     Mike Haro MD In 2 weeks.    Specialty: General Surgery  Why: postop right lumpectomy and SLNB  Contact information:  [address]  [address]  [facility]  [facility] [address] [phone]                   Shara Wright MD

## 2020-08-28 NOTE — PROGRESS NOTES
Breast Surgery Note:     Ms. Julissa Singletary was scheduled for a right breast lumpectomy and sentinel lymph node biopsy for today 8/28/20. She received a pre-operative block as planned the day of surgery. However, on close review of her imaging, there was no reflector placed preoperatively. Dr. Haro discussed with the patient (sedated) and her  that we could not proceed with surgery today. She will undergo reflector placement and repeat COVID test next Wednesday 9/2/20, with surgery to be scheduled for Friday 9/4/20. Condolences and sincere appologies were provided to the patient and her . We will investigate as to why the reflector did not get placed.     Shara Wright MD  General Surgery Resident, PGY III  Pager [phone]

## 2020-08-28 NOTE — INTERVAL H&P NOTE
The patient has been examined and the H&P has been reviewed:    No acute interval changes.    Surgery risks, benefits and alternative options discussed and understood by patient/family.          Active Hospital Problems    Diagnosis  POA    Breast cancer, right [C50.911]  Yes      Resolved Hospital Problems   No resolved problems to display.

## 2020-08-28 NOTE — PLAN OF CARE
Arrival time of 800 am given for surgery tomorrow at [facility], [address], 1st [address]. Instructions given on food/fluid intake, medications, COVID info and visitor policy.  Voiced understanding

## 2020-08-28 NOTE — ANESTHESIA PREPROCEDURE EVALUATION
08/28/2020  Julissa Singletary is a 69 y.o., female.  Pre-operative evaluation for Procedure(s) (LRB):  MASTECTOMY, PARTIAL (Right)  INJECTION, FOR SENTINEL NODE IDENTIFICATION (Right)  BIOPSY, LYMPH NODE, SENTINEL (Right)  LYMPHADENECTOMY, AXILLARY (Right)    Julissa Singletary is a 69 y.o. female     Patient Active Problem List   Diagnosis    Hepatitis B    Mild persistent asthma    Essential hypertension    Hyperlipidemia    Cerebral palsy    Primary insomnia    Osteoporosis without current pathological fracture    Acquired right foot drop    History of encephalitis    OLI (generalized anxiety disorder)    Invasive ductal carcinoma of breast, female, right    Breast cancer, right       Review of patient's allergies indicates:  No Known Allergies    No current facility-administered medications on file prior to encounter.      Current Outpatient Medications on File Prior to Encounter   Medication Sig Dispense Refill    alendronate (FOSAMAX) 70 MG tablet TAKE ONE TABLET BY MOUTH EVERY 7 DAYS 12 tablet 3    atorvastatin (LIPITOR) 20 MG tablet TAKE 1 TABLET BY MOUTH ONCE DAILY 90 tablet 3    carvedilol (COREG) 6.25 MG tablet TAKE 1 TABLET(6.25 MG) BY MOUTH TWICE DAILY WITH MEALS 180 tablet 3    escitalopram oxalate (LEXAPRO) 20 MG tablet Take 1 tablet (20 mg total) by mouth once daily. 90 tablet 3    melatonin 5 mg Tab Take 1 tablet by mouth nightly as needed.      TURMERIC ORAL Take 1 tablet by mouth once daily.      valsartan-hydrochlorothiazide (DIOVAN-HCT) 320-25 mg per tablet TAKE 1 TABLET BY MOUTH EVERY DAY 90 tablet 1    LORazepam (ATIVAN) 1 MG tablet TAKE 1 TABLET BY MOUTH EVERY NIGHT AT BEDTIME AS NEEDED FOR INSOMNIA 30 tablet 1       Past Surgical History:   Procedure Laterality Date    HYSTERECTOMY         Social History     Socioeconomic History    Marital status:       Spouse name: Not on file    Number of children: Not on file    Years of education: Not on file    Highest education level: Not on file   Occupational History    Not on file   Social Needs    Financial resource strain: Not on file    Food insecurity     Worry: Not on file     Inability: Not on file    Transportation needs     Medical: Not on file     Non-medical: Not on file   Tobacco Use    Smoking status: Never Smoker    Smokeless tobacco: Never Used   Substance and Sexual Activity    Alcohol use: Yes    Drug use: No    Sexual activity: Yes   Lifestyle    Physical activity     Days per week: Not on file     Minutes per session: Not on file    Stress: Not on file   Relationships    Social connections     Talks on phone: Not on file     Gets together: Not on file     Attends Restorationism service: Not on file     Active member of club or organization: Not on file     Attends meetings of clubs or organizations: Not on file     Relationship status: Not on file   Other Topics Concern    Not on file   Social History Narrative    Not on file       Anesthesia Evaluation    I have reviewed the Patient Summary Reports.   I have reviewed the NPO Status.   I have reviewed the Medications.     Review of Systems  Anesthesia Hx:  No problems with previous Anesthesia  History of prior surgery of interest to airway management or planning:  Denies Personal Hx of Anesthesia complications.   Social:  Non-Smoker    Cardiovascular:   Hypertension, well controlled    Pulmonary:  Pulmonary Normal    Hepatic/GI:   Hepatitis, B    Neurological:  Neurology Normal right spastic hemiparesis/cerebral palsy following infectious encephalitis at age 6   Endocrine:  Endocrine Normal        Physical Exam  General:  Well nourished    Airway/Jaw/Neck:  Airway Findings: Mouth Opening: Normal Tongue: Normal  General Airway Assessment: Adult  Improves to II with phonation.  TM Distance: 4 - 6 cm      Dental:  Dental Findings: In tact         Mental Status:  Mental Status Findings:  Cooperative, Alert and Oriented         Anesthesia Plan  Type of Anesthesia, risks & benefits discussed:  Anesthesia Type:  general  Patient's Preference:   Intra-op Monitoring Plan: standard ASA monitors  Intra-op Monitoring Plan Comments:   Post Op Pain Control Plan: per primary service following discharge from PACU and peripheral nerve block  Post Op Pain Control Plan Comments:   Induction:   IV  Beta Blocker:  Patient is not currently on a Beta-Blocker (No further documentation required).       Informed Consent: Patient understands risks and agrees with Anesthesia plan.  Questions answered. Anesthesia consent signed with patient.  ASA Score: 2     Day of Surgery Review of History & Physical:    H&P update referred to the surgeon.         Ready For Surgery From Anesthesia Perspective.

## 2020-08-28 NOTE — ANESTHESIA PROCEDURE NOTES
Right Paravertebral Single Shot    Patient location during procedure: pre-op   Block not for primary anesthetic.  Reason for block: at surgeon's request and post-op pain management   Post-op Pain Location: Right Chest Wall Pain  Start time: 8/28/2020 11:00 AM  Timeout: 8/28/2020 11:00 AM   End time: 8/28/2020 11:20 AM    Staffing  Authorizing Provider: Brain Ortiz MD  Performing Provider: Santo Vivar MD    Staffing  Other anesthesia staff: Barbie Ervin MD  Preanesthetic Checklist  Completed: patient identified, site marked, surgical consent, pre-op evaluation, timeout performed, IV checked, risks and benefits discussed and monitors and equipment checked  Peripheral Block  Patient position: sitting  Prep: ChloraPrep  Patient monitoring: heart rate, cardiac monitor, continuous pulse ox, continuous capnometry and frequent blood pressure checks  Block type: paravertebral - thoracic  Laterality: right  Injection technique: single shot  Location: T2-3, T3-4 and T4-5  Needle  Needle type: Tuohy   Needle gauge: 17 G  Needle length: 3.5 in  Needle localization: anatomical landmarks     Assessment  Injection assessment: negative aspiration and negative parasthesia  Paresthesia pain: none  Heart rate change: no  Slow fractionated injection: yes  Additional Notes  T2 os at 5 cm  T4 os at 4 cm  VSS.  [facility] RN monitoring vitals throughout procedure.  Patient tolerated procedure well.

## 2020-08-31 ENCOUNTER — TELEPHONE (OUTPATIENT)
Dept: SURGERY | Facility: CLINIC | Age: 70
End: 2020-08-31

## 2020-08-31 NOTE — TELEPHONE ENCOUNTER
Returned patient's phone call and confirmed reflector placement at the [facility] [place] for Tuesday 9/1 at 0800 and Covid test right after on the 2nd floor multispecialty surgery clinic. Locations and times confirmed and patient verbalized understanding.     ----- Message from Veena Menjivar sent at 8/31/2020  9:17 AM CDT -----  Pt is calling to get time and location of procedure. Asking for a call back.      Contact [facility] [phone]

## 2020-09-01 ENCOUNTER — DOCUMENTATION ONLY (OUTPATIENT)
Dept: SURGERY | Facility: CLINIC | Age: 70
End: 2020-09-01

## 2020-09-01 ENCOUNTER — LAB VISIT (OUTPATIENT)
Dept: SURGERY | Facility: CLINIC | Age: 70
End: 2020-09-01
Payer: MEDICARE

## 2020-09-01 ENCOUNTER — HOSPITAL ENCOUNTER (OUTPATIENT)
Dept: RADIOLOGY | Facility: HOSPITAL | Age: 70
Discharge: HOME | End: 2020-09-01
Attending: SURGERY
Payer: MEDICARE

## 2020-09-01 DIAGNOSIS — Z17.0 MALIGNANT NEOPLASM OF UPPER-INNER QUADRANT OF RIGHT BREAST IN FEMALE, ESTROGEN RECEPTOR POSITIVE: ICD-10-CM

## 2020-09-01 DIAGNOSIS — Z01.818 PRE-OP TESTING: ICD-10-CM

## 2020-09-01 DIAGNOSIS — C50.211 MALIGNANT NEOPLASM OF UPPER-INNER QUADRANT OF RIGHT BREAST IN FEMALE, ESTROGEN RECEPTOR POSITIVE: ICD-10-CM

## 2020-09-01 LAB — SARS-COV-2 RNA RESP QL NAA+PROBE: NOT DETECTED

## 2020-09-01 PROCEDURE — 19285 PERQ DEV BREAST 1ST US IMAG: CPT | Mod: RT

## 2020-09-01 PROCEDURE — 19285 PERQ DEV BREAST 1ST US IMAG: CPT | Mod: RT,,, | Performed by: RADIOLOGY

## 2020-09-01 PROCEDURE — U0003 INFECTIOUS AGENT DETECTION BY NUCLEIC ACID (DNA OR RNA); SEVERE ACUTE RESPIRATORY SYNDROME CORONAVIRUS 2 (SARS-COV-2) (CORONAVIRUS DISEASE [COVID-19]), AMPLIFIED PROBE TECHNIQUE, MAKING USE OF HIGH THROUGHPUT TECHNOLOGIES AS DESCRIBED BY CMS-2020-01-R: HCPCS

## 2020-09-01 PROCEDURE — 25000003 PHARM REV CODE 250: Performed by: SURGERY

## 2020-09-01 PROCEDURE — 77065 MAMMO DIGITAL DIAGNOSTIC RIGHT WITH CAD: ICD-10-PCS | Mod: 26,,, | Performed by: RADIOLOGY

## 2020-09-01 PROCEDURE — A4648 IMPLANTABLE TISSUE MARKER: HCPCS

## 2020-09-01 PROCEDURE — 19285 US BREAST RADAR REFLECTOR LOCALIZATION W/GUIDANCE, 1ST LESION, RIGHT: ICD-10-PCS | Mod: RT,,, | Performed by: RADIOLOGY

## 2020-09-01 PROCEDURE — 77065 DX MAMMO INCL CAD UNI: CPT | Mod: 26,,, | Performed by: RADIOLOGY

## 2020-09-01 RX ORDER — LIDOCAINE HYDROCHLORIDE 20 MG/ML
10 INJECTION, SOLUTION INFILTRATION; PERINEURAL ONCE
Status: COMPLETED | OUTPATIENT
Start: 2020-09-01 | End: 2020-09-01

## 2020-09-01 RX ADMIN — LIDOCAINE HYDROCHLORIDE 10 ML: 20 INJECTION, SOLUTION INFILTRATION; PERINEURAL at 08:09

## 2020-09-01 NOTE — PROGRESS NOTES
I called this patient today and discussed that her genetic testing was NEGATIVE for any mutations.  All questions and concerns were addressed.    -Kamilah Welsh PA-C

## 2020-09-02 NOTE — PLAN OF CARE
Covid results reviewed 9/2 are negative and are within the acceptable 72 hour timeframe set per management.

## 2020-09-03 ENCOUNTER — ANESTHESIA EVENT (OUTPATIENT)
Dept: SURGERY | Facility: HOSPITAL | Age: 70
End: 2020-09-03
Payer: MEDICARE

## 2020-09-03 ENCOUNTER — TELEPHONE (OUTPATIENT)
Dept: SURGERY | Facility: CLINIC | Age: 70
End: 2020-09-03

## 2020-09-03 RX ORDER — CELECOXIB 200 MG/1
400 CAPSULE ORAL ONCE
Status: CANCELLED | OUTPATIENT
Start: 2020-09-03 | End: 2020-09-03

## 2020-09-04 ENCOUNTER — HOSPITAL ENCOUNTER (OUTPATIENT)
Facility: HOSPITAL | Age: 70
Discharge: HOME | End: 2020-09-04
Attending: SURGERY | Admitting: SURGERY
Payer: MEDICARE

## 2020-09-04 ENCOUNTER — HOSPITAL ENCOUNTER (OUTPATIENT)
Dept: RADIOLOGY | Facility: HOSPITAL | Age: 70
Discharge: HOME | End: 2020-09-04
Attending: SURGERY | Admitting: SURGERY
Payer: MEDICARE

## 2020-09-04 ENCOUNTER — HOSPITAL ENCOUNTER (OUTPATIENT)
Dept: RADIOLOGY | Facility: HOSPITAL | Age: 70
Discharge: HOME | End: 2020-09-04
Attending: SURGERY
Payer: MEDICARE

## 2020-09-04 ENCOUNTER — ANESTHESIA (OUTPATIENT)
Dept: SURGERY | Facility: HOSPITAL | Age: 70
End: 2020-09-04
Payer: MEDICARE

## 2020-09-04 VITALS
DIASTOLIC BLOOD PRESSURE: 71 MMHG | HEIGHT: 62 IN | BODY MASS INDEX: 36.8 KG/M2 | OXYGEN SATURATION: 99 % | HEART RATE: 78 BPM | RESPIRATION RATE: 22 BRPM | TEMPERATURE: 98 F | SYSTOLIC BLOOD PRESSURE: 131 MMHG | WEIGHT: 200 LBS

## 2020-09-04 DIAGNOSIS — C50.211 MALIGNANT NEOPLASM OF UPPER-INNER QUADRANT OF RIGHT BREAST IN FEMALE, ESTROGEN RECEPTOR POSITIVE: ICD-10-CM

## 2020-09-04 DIAGNOSIS — C50.919 INFILTRATING DUCTAL CARCINOMA: Primary | ICD-10-CM

## 2020-09-04 DIAGNOSIS — Z17.0 MALIGNANT NEOPLASM OF UPPER-INNER QUADRANT OF RIGHT BREAST IN FEMALE, ESTROGEN RECEPTOR POSITIVE: ICD-10-CM

## 2020-09-04 PROCEDURE — 64461 PVB THORACIC SINGLE INJ SITE: CPT | Mod: 59,RT,, | Performed by: ANESTHESIOLOGY

## 2020-09-04 PROCEDURE — 94761 N-INVAS EAR/PLS OXIMETRY MLT: CPT

## 2020-09-04 PROCEDURE — 99900035 HC TECH TIME PER 15 MIN (STAT)

## 2020-09-04 PROCEDURE — 71000039 HC RECOVERY, EACH ADD'L HOUR: Performed by: SURGERY

## 2020-09-04 PROCEDURE — 25000003 PHARM REV CODE 250: Performed by: SURGERY

## 2020-09-04 PROCEDURE — SURGDEF SURGDEF DUMMY CODE: Mod: ,,, | Performed by: SURGERY

## 2020-09-04 PROCEDURE — 71000033 HC RECOVERY, INTIAL HOUR: Performed by: SURGERY

## 2020-09-04 PROCEDURE — 25000003 PHARM REV CODE 250: Performed by: STUDENT IN AN ORGANIZED HEALTH CARE EDUCATION/TRAINING PROGRAM

## 2020-09-04 PROCEDURE — 63600175 PHARM REV CODE 636 W HCPCS: Performed by: STUDENT IN AN ORGANIZED HEALTH CARE EDUCATION/TRAINING PROGRAM

## 2020-09-04 PROCEDURE — A9520 TC99 TILMANOCEPT DIAG 0.5MCI: HCPCS

## 2020-09-04 PROCEDURE — 88341 IMHCHEM/IMCYTCHM EA ADD ANTB: CPT | Performed by: PATHOLOGY

## 2020-09-04 PROCEDURE — 64461 PARAVERTEBRAL SINGLE INJECTION BLOCK(S): ICD-10-PCS | Mod: 59,RT,, | Performed by: ANESTHESIOLOGY

## 2020-09-04 PROCEDURE — D9220A PRA ANESTHESIA: ICD-10-PCS | Mod: ANES,,, | Performed by: ANESTHESIOLOGY

## 2020-09-04 PROCEDURE — 88307 TISSUE EXAM BY PATHOLOGIST: CPT | Mod: 59 | Performed by: PATHOLOGY

## 2020-09-04 PROCEDURE — D9220A PRA ANESTHESIA: Mod: ANES,,, | Performed by: ANESTHESIOLOGY

## 2020-09-04 PROCEDURE — D9220A PRA ANESTHESIA: Mod: CRNA,,, | Performed by: NURSE ANESTHETIST, CERTIFIED REGISTERED

## 2020-09-04 PROCEDURE — 88307 PR  SURG PATH,LEVEL V: ICD-10-PCS | Mod: 26,,, | Performed by: PATHOLOGY

## 2020-09-04 PROCEDURE — 37000008 HC ANESTHESIA 1ST 15 MINUTES: Performed by: SURGERY

## 2020-09-04 PROCEDURE — 76098 MAMMO BREAST SPECIMEN: ICD-10-PCS | Mod: 26,,, | Performed by: RADIOLOGY

## 2020-09-04 PROCEDURE — 36000706: Performed by: SURGERY

## 2020-09-04 PROCEDURE — 88341 IMHCHEM/IMCYTCHM EA ADD ANTB: CPT | Mod: 26,,, | Performed by: PATHOLOGY

## 2020-09-04 PROCEDURE — 88342 CHG IMMUNOCYTOCHEMISTRY: ICD-10-PCS | Mod: 26,,, | Performed by: PATHOLOGY

## 2020-09-04 PROCEDURE — 88307 TISSUE EXAM BY PATHOLOGIST: CPT | Mod: 26,,, | Performed by: PATHOLOGY

## 2020-09-04 PROCEDURE — 25000003 PHARM REV CODE 250: Performed by: NURSE ANESTHETIST, CERTIFIED REGISTERED

## 2020-09-04 PROCEDURE — 64462 PARAVERTEBRAL SINGLE INJECTION BLOCK(S): ICD-10-PCS | Mod: RT,,, | Performed by: ANESTHESIOLOGY

## 2020-09-04 PROCEDURE — 63600175 PHARM REV CODE 636 W HCPCS: Performed by: NURSE ANESTHETIST, CERTIFIED REGISTERED

## 2020-09-04 PROCEDURE — 76098 X-RAY EXAM SURGICAL SPECIMEN: CPT | Mod: 26,,, | Performed by: RADIOLOGY

## 2020-09-04 PROCEDURE — 37000009 HC ANESTHESIA EA ADD 15 MINS: Performed by: SURGERY

## 2020-09-04 PROCEDURE — 76098 X-RAY EXAM SURGICAL SPECIMEN: CPT | Mod: TC

## 2020-09-04 PROCEDURE — 27201423 OPTIME MED/SURG SUP & DEVICES STERILE SUPPLY: Performed by: SURGERY

## 2020-09-04 PROCEDURE — 64462 PVB THORACIC 2ND+ INJ SITE: CPT | Mod: RT,,, | Performed by: ANESTHESIOLOGY

## 2020-09-04 PROCEDURE — D9220A PRA ANESTHESIA: ICD-10-PCS | Mod: CRNA,,, | Performed by: NURSE ANESTHETIST, CERTIFIED REGISTERED

## 2020-09-04 PROCEDURE — C1894 INTRO/SHEATH, NON-LASER: HCPCS | Performed by: SURGERY

## 2020-09-04 PROCEDURE — 88341 PR IHC OR ICC EACH ADD'L SINGLE ANTIBODY  STAINPR: ICD-10-PCS | Mod: 26,,, | Performed by: PATHOLOGY

## 2020-09-04 PROCEDURE — 71000015 HC POSTOP RECOV 1ST HR: Performed by: SURGERY

## 2020-09-04 PROCEDURE — 36000707: Performed by: SURGERY

## 2020-09-04 PROCEDURE — SURGDEF SURGDEF DUMMY CODE: ICD-10-PCS | Mod: ,,, | Performed by: SURGERY

## 2020-09-04 PROCEDURE — S0020 INJECTION, BUPIVICAINE HYDRO: HCPCS | Performed by: SURGERY

## 2020-09-04 PROCEDURE — 94770 HC EXHALED C02 TEST: CPT

## 2020-09-04 PROCEDURE — 64461 PVB THORACIC SINGLE INJ SITE: CPT | Performed by: STUDENT IN AN ORGANIZED HEALTH CARE EDUCATION/TRAINING PROGRAM

## 2020-09-04 PROCEDURE — 88342 IMHCHEM/IMCYTCHM 1ST ANTB: CPT | Performed by: PATHOLOGY

## 2020-09-04 PROCEDURE — 88342 IMHCHEM/IMCYTCHM 1ST ANTB: CPT | Mod: 26,,, | Performed by: PATHOLOGY

## 2020-09-04 RX ORDER — ONDANSETRON 2 MG/ML
4 INJECTION INTRAMUSCULAR; INTRAVENOUS DAILY PRN
Status: DISCONTINUED | OUTPATIENT
Start: 2020-09-04 | End: 2020-09-04 | Stop reason: HOSPADM

## 2020-09-04 RX ORDER — HYDROCODONE BITARTRATE AND ACETAMINOPHEN 5; 325 MG/1; MG/1
1 TABLET ORAL EVERY 6 HOURS PRN
Qty: 21 TABLET | Refills: 0 | Status: SHIPPED | OUTPATIENT
Start: 2020-09-04 | End: 2021-02-02

## 2020-09-04 RX ORDER — BUPIVACAINE HYDROCHLORIDE 5 MG/ML
INJECTION, SOLUTION EPIDURAL; INTRACAUDAL
Status: DISCONTINUED | OUTPATIENT
Start: 2020-09-04 | End: 2020-09-04 | Stop reason: HOSPADM

## 2020-09-04 RX ORDER — HYDROCODONE BITARTRATE AND ACETAMINOPHEN 5; 325 MG/1; MG/1
1 TABLET ORAL EVERY 4 HOURS PRN
Status: DISCONTINUED | OUTPATIENT
Start: 2020-09-04 | End: 2020-09-04 | Stop reason: HOSPADM

## 2020-09-04 RX ORDER — CALCIUM CARBONATE 600 MG
600 TABLET ORAL ONCE
COMMUNITY

## 2020-09-04 RX ORDER — SODIUM CHLORIDE 0.9 % (FLUSH) 0.9 %
10 SYRINGE (ML) INJECTION
Status: DISCONTINUED | OUTPATIENT
Start: 2020-09-04 | End: 2020-09-04 | Stop reason: HOSPADM

## 2020-09-04 RX ORDER — ISOSULFAN BLUE 50 MG/5ML
INJECTION, SOLUTION SUBCUTANEOUS
Status: DISCONTINUED | OUTPATIENT
Start: 2020-09-04 | End: 2020-09-04 | Stop reason: HOSPADM

## 2020-09-04 RX ORDER — FENTANYL CITRATE 50 UG/ML
25 INJECTION, SOLUTION INTRAMUSCULAR; INTRAVENOUS EVERY 5 MIN PRN
Status: DISCONTINUED | OUTPATIENT
Start: 2020-09-04 | End: 2021-02-02

## 2020-09-04 RX ORDER — EPHEDRINE SULFATE 50 MG/ML
INJECTION, SOLUTION INTRAVENOUS
Status: DISCONTINUED | OUTPATIENT
Start: 2020-09-04 | End: 2020-09-04

## 2020-09-04 RX ORDER — CEFAZOLIN SODIUM 1 G/3ML
2 INJECTION, POWDER, FOR SOLUTION INTRAMUSCULAR; INTRAVENOUS
Status: DISCONTINUED | OUTPATIENT
Start: 2020-09-04 | End: 2021-02-02

## 2020-09-04 RX ORDER — SODIUM CHLORIDE 9 MG/ML
INJECTION, SOLUTION INTRAVENOUS CONTINUOUS
Status: DISCONTINUED | OUTPATIENT
Start: 2020-09-04 | End: 2021-02-02

## 2020-09-04 RX ORDER — PROPOFOL 10 MG/ML
VIAL (ML) INTRAVENOUS
Status: DISCONTINUED | OUTPATIENT
Start: 2020-09-04 | End: 2020-09-04

## 2020-09-04 RX ORDER — HYDROMORPHONE HYDROCHLORIDE 1 MG/ML
0.2 INJECTION, SOLUTION INTRAMUSCULAR; INTRAVENOUS; SUBCUTANEOUS EVERY 5 MIN PRN
Status: DISCONTINUED | OUTPATIENT
Start: 2020-09-04 | End: 2020-09-04 | Stop reason: HOSPADM

## 2020-09-04 RX ORDER — ONDANSETRON 2 MG/ML
INJECTION INTRAMUSCULAR; INTRAVENOUS
Status: DISCONTINUED | OUTPATIENT
Start: 2020-09-04 | End: 2020-09-04

## 2020-09-04 RX ORDER — ACETAMINOPHEN 500 MG
1000 TABLET ORAL
Status: COMPLETED | OUTPATIENT
Start: 2020-09-04 | End: 2020-09-04

## 2020-09-04 RX ORDER — LIDOCAINE HYDROCHLORIDE 20 MG/ML
INJECTION INTRAVENOUS
Status: DISCONTINUED | OUTPATIENT
Start: 2020-09-04 | End: 2020-09-04

## 2020-09-04 RX ORDER — SODIUM CHLORIDE 9 MG/ML
INJECTION, SOLUTION INTRAVENOUS CONTINUOUS
Status: DISCONTINUED | OUTPATIENT
Start: 2020-09-04 | End: 2020-09-04 | Stop reason: HOSPADM

## 2020-09-04 RX ORDER — FENTANYL CITRATE 50 UG/ML
25 INJECTION, SOLUTION INTRAMUSCULAR; INTRAVENOUS EVERY 5 MIN PRN
Status: DISCONTINUED | OUTPATIENT
Start: 2020-09-04 | End: 2020-09-04 | Stop reason: HOSPADM

## 2020-09-04 RX ORDER — ONDANSETRON 4 MG/1
8 TABLET, ORALLY DISINTEGRATING ORAL EVERY 8 HOURS PRN
Status: DISCONTINUED | OUTPATIENT
Start: 2020-09-04 | End: 2020-09-04 | Stop reason: HOSPADM

## 2020-09-04 RX ORDER — DEXAMETHASONE SODIUM PHOSPHATE 4 MG/ML
INJECTION, SOLUTION INTRA-ARTICULAR; INTRALESIONAL; INTRAMUSCULAR; INTRAVENOUS; SOFT TISSUE
Status: DISCONTINUED | OUTPATIENT
Start: 2020-09-04 | End: 2020-09-04

## 2020-09-04 RX ORDER — FENTANYL CITRATE 50 UG/ML
INJECTION, SOLUTION INTRAMUSCULAR; INTRAVENOUS
Status: DISCONTINUED | OUTPATIENT
Start: 2020-09-04 | End: 2020-09-04

## 2020-09-04 RX ORDER — MIDAZOLAM HYDROCHLORIDE 1 MG/ML
0.5 INJECTION INTRAMUSCULAR; INTRAVENOUS
Status: DISCONTINUED | OUTPATIENT
Start: 2020-09-04 | End: 2021-02-02

## 2020-09-04 RX ORDER — MIDAZOLAM HYDROCHLORIDE 1 MG/ML
INJECTION, SOLUTION INTRAMUSCULAR; INTRAVENOUS
Status: DISCONTINUED | OUTPATIENT
Start: 2020-09-04 | End: 2020-09-04

## 2020-09-04 RX ORDER — HYDROCODONE BITARTRATE AND ACETAMINOPHEN 10; 325 MG/1; MG/1
1 TABLET ORAL EVERY 4 HOURS PRN
Status: DISCONTINUED | OUTPATIENT
Start: 2020-09-04 | End: 2020-09-04 | Stop reason: HOSPADM

## 2020-09-04 RX ADMIN — EPHEDRINE SULFATE 10 MG: 50 INJECTION INTRAVENOUS at 09:09

## 2020-09-04 RX ADMIN — CEFAZOLIN 2 G: 330 INJECTION, POWDER, FOR SOLUTION INTRAMUSCULAR; INTRAVENOUS at 09:09

## 2020-09-04 RX ADMIN — ONDANSETRON 4 MG: 2 INJECTION, SOLUTION INTRAMUSCULAR; INTRAVENOUS at 10:09

## 2020-09-04 RX ADMIN — LIDOCAINE HYDROCHLORIDE 80 MG: 20 INJECTION, SOLUTION INTRAVENOUS at 09:09

## 2020-09-04 RX ADMIN — ACETAMINOPHEN 1000 MG: 500 TABLET ORAL at 08:09

## 2020-09-04 RX ADMIN — DEXAMETHASONE SODIUM PHOSPHATE 8 MG: 4 INJECTION, SOLUTION INTRAMUSCULAR; INTRAVENOUS at 09:09

## 2020-09-04 RX ADMIN — SODIUM CHLORIDE, SODIUM GLUCONATE, SODIUM ACETATE, POTASSIUM CHLORIDE, MAGNESIUM CHLORIDE, SODIUM PHOSPHATE, DIBASIC, AND POTASSIUM PHOSPHATE: .53; .5; .37; .037; .03; .012; .00082 INJECTION, SOLUTION INTRAVENOUS at 11:09

## 2020-09-04 RX ADMIN — MIDAZOLAM 1 MG: 1 INJECTION INTRAMUSCULAR; INTRAVENOUS at 08:09

## 2020-09-04 RX ADMIN — MIDAZOLAM HYDROCHLORIDE 2 MG: 1 INJECTION, SOLUTION INTRAMUSCULAR; INTRAVENOUS at 09:09

## 2020-09-04 RX ADMIN — PROPOFOL 200 MG: 10 INJECTION, EMULSION INTRAVENOUS at 09:09

## 2020-09-04 RX ADMIN — FENTANYL CITRATE 50 MCG: 50 INJECTION, SOLUTION INTRAMUSCULAR; INTRAVENOUS at 11:09

## 2020-09-04 RX ADMIN — EPHEDRINE SULFATE 10 MG: 50 INJECTION INTRAVENOUS at 10:09

## 2020-09-04 RX ADMIN — SODIUM CHLORIDE: 0.9 INJECTION, SOLUTION INTRAVENOUS at 08:09

## 2020-09-04 RX ADMIN — SODIUM CHLORIDE: 0.9 INJECTION, SOLUTION INTRAVENOUS at 09:09

## 2020-09-04 RX ADMIN — FENTANYL CITRATE 50 MCG: 50 INJECTION INTRAMUSCULAR; INTRAVENOUS at 08:09

## 2020-09-04 RX ADMIN — FENTANYL CITRATE 50 MCG: 50 INJECTION, SOLUTION INTRAMUSCULAR; INTRAVENOUS at 09:09

## 2020-09-04 NOTE — PROGRESS NOTES
Pre op completed. All concerns addressed. Patient belongings to be placed in locker 8. Bed in lowest position. Call light within reach. Family at beside.

## 2020-09-04 NOTE — DISCHARGE INSTRUCTIONS

## 2020-09-04 NOTE — ANESTHESIA PROCEDURE NOTES
Paravertebral Single Injection Block(s)    Patient location during procedure: pre-op   Block not for primary anesthetic.  Reason for block: at surgeon's request and post-op pain management   Post-op Pain Location: right chest pain  Start time: 9/4/2020 8:35 AM  Timeout: 9/4/2020 8:35 AM   End time: 9/4/2020 8:45 AM    Staffing  Authorizing Provider: Brain Ortiz MD  Performing Provider: Jorge L Ibanez MD    Preanesthetic Checklist  Completed: patient identified, site marked, surgical consent, pre-op evaluation, timeout performed, IV checked, risks and benefits discussed and monitors and equipment checked  Peripheral Block  Patient position: sitting  Prep: ChloraPrep  Patient monitoring: heart rate, cardiac monitor, continuous pulse ox, continuous capnometry and frequent blood pressure checks  Block type: paravertebral - thoracic  Laterality: right  Injection technique: single shot  Location: T2-3 and T4-5  Needle  Needle type: Tuohy   Needle gauge: 17 G  Needle length: 3.5 in  Needle localization: anatomical landmarks     Assessment  Injection assessment: negative aspiration and negative parasthesia  Paresthesia pain: none  Heart rate change: no  Slow fractionated injection: yes  Additional Notes  T2 os at 4 cm  T4 os at 3.5 cm  VSS.  [facility] RN monitoring vitals throughout procedure.  Patient tolerated procedure well.     15 cc's of 0.5% ropi w/ epi administered via landmark based technique.

## 2020-09-04 NOTE — PROGRESS NOTES
Report received from Thom Morales. Patient resting with no pain. Called into OR to get postop orders for this patient.

## 2020-09-04 NOTE — PROGRESS NOTES
Called into OR #2 and message relayed to Dr. Story that patient is still awaiting discharge orders.

## 2020-09-04 NOTE — ANESTHESIA PROCEDURE NOTES
Intubation  Performed by: Shara Lassiter CRNA  Authorized by: Lore Eaton MD     Intubation:     Induction:  Intravenous    Intubated:  Postinduction    Mask Ventilation:  Easy mask    Attempts:  1    Attempted By:  CRNA    Difficult Airway Encountered?: No      Complications:  None    Airway Device Size:  3.5    Style/Cuff Inflation:  Cuffed    Inflation Amount (mL):  0    Secured at:  The lips    Placement Verified By:  Capnometry    Findings Post-Intubation:  BS equal bilateral

## 2020-09-04 NOTE — INTERVAL H&P NOTE
The patient has been examined and the H&P has been reviewed:    I concur with the findings and no changes have occurred since H&P was written.     No port placement.    Surgery risks, benefits and alternative options discussed and understood by patient/family.          Active Hospital Problems    Diagnosis  POA    Infiltrating ductal carcinoma [C50.919]  Yes      Resolved Hospital Problems   No resolved problems to display.

## 2020-09-04 NOTE — BRIEF OP NOTE
Brief Operative Note     9/4/2020    PRE-OP DIAGNOSIS: Malignant neoplasm of upper-inner quadrant of right breast in female, estrogen receptor positive [C50.211, Z17.0]      POST-OP DIAGNOSIS: Post-Op Diagnosis Codes:     * Malignant neoplasm of upper-inner quadrant of right breast in female, estrogen receptor positive [C50.211, Z17.0]    PROCEDURE:  Right reflector localized partial mastectomy (lumpectomy) and right axillary sentinel lymph node biopsy.     SURGEON: Surgeon(s) and Role:     * Mike Haro MD - Primary    First Assist/Resident: Kamilah Welsh PA-C    ANESTHESIA: General     PROCEDURE IN DETAIL: See dictation Report     ESTIMATED BLOOD LOSS: less than 50 mL    SPECIMENS: breast mass and lymph nodes    Specimens (From admission, onward)     Start     Ordered    09/04/20 1107  Specimen to Pathology, Surgery Breast  Once     Question Answer Comment   Procedure Type: Breast    Specimen Class: Known or suspected malignancy        09/04/20 1108    09/04/20 1036  Specimen to Pathology, Surgery Breast  Once     Question Answer Comment   Procedure Type: Breast    Specimen Class: Known or suspected malignancy        09/04/20 1036    09/04/20 1023  Specimen to Pathology, Surgery Breast  Once     Question Answer Comment   Procedure Type: Breast    Specimen Class: Known or suspected malignancy        09/04/20 1029              COMPLICATIONS: none    DISPOSITION: PACU - hemodynamically stable.    -Kamilah Welsh PA-C

## 2020-09-04 NOTE — PLAN OF CARE
Patient is AAO and VSS.  Tolerating PO and states pain is tolerable.  Dressing CDI.  Patient states they are ready for d/c.  IV removed.  Catheter tip intact.  Caregiver at bedside.  Discharge instructions reviewed and copy given to the patient and caregiver.  Questions answered.  Both verbalized understanding.  Paper rx given  Surgical bra in place upon arrival to PACU.no DME needed.  Patient wheeled to car by RN/PCT.

## 2020-09-04 NOTE — ANESTHESIA PREPROCEDURE EVALUATION
09/04/2020  Julissa Singletary is a 69 y.o., female with pmhx of cerebral palsy with associated right sided paralysis ( intact sensation). Recent diagnosis of breast cancer.  Now scheduled for partial right mastectomy.     Anesthesia Evaluation    I have reviewed the Patient Summary Reports.    I have reviewed the NPO Status.      Review of Systems  Anesthesia Hx:  History of prior surgery of interest to airway management or planning: Denies Family Hx of Anesthesia complications.   Denies Personal Hx of Anesthesia complications.   Cardiovascular:   Hypertension    Pulmonary:   Asthma (distant hx of asthmas - as child. No recent exacerbations. ) asymptomatic    Hepatic/GI:   Hepatitis, B    Psych:   Psychiatric History              Patient Active Problem List   Diagnosis    Hepatitis B    Mild persistent asthma    Essential hypertension    Hyperlipidemia    Cerebral palsy    Primary insomnia    Osteoporosis without current pathological fracture    Acquired right foot drop    History of encephalitis    OLI (generalized anxiety disorder)    Invasive ductal carcinoma of breast, female, right    Breast cancer, right    Infiltrating ductal carcinoma       Review of patient's allergies indicates:  No Known Allergies    Current Facility-Administered Medications on File Prior to Visit   Medication Dose Route Frequency Provider Last Rate Last Dose    0.9%  NaCl infusion   Intravenous Continuous Shara Wright MD 70 mL/hr at 08/28/20 1050      0.9%  NaCl infusion   Intravenous Continuous Michel Story MD 70 mL/hr at 09/04/20 0832      ceFAZolin injection 2 g  2 g Intravenous On Call Procedure Shara Wright MD        ceFAZolin injection 2 g  2 g Intravenous On Call Procedure Michel Story MD        fentaNYL injection 25 mcg  25 mcg Intravenous Q5 Min PRN Jorge L  Ulises Ibanez MD        midazolam (VERSED) 1 mg/mL injection 0.5 mg  0.5 mg Intravenous PRN Jorge L Ibanez MD         Current Outpatient Medications on File Prior to Visit   Medication Sig Dispense Refill    alendronate (FOSAMAX) 70 MG tablet TAKE ONE TABLET BY MOUTH EVERY 7 DAYS 12 tablet 3    atorvastatin (LIPITOR) 20 MG tablet TAKE 1 TABLET BY MOUTH ONCE DAILY 90 tablet 3    calcium carbonate (OS-BEBE) 600 mg calcium (1,500 mg) Tab Take 600 mg by mouth once.      carvedilol (COREG) 6.25 MG tablet TAKE 1 TABLET(6.25 MG) BY MOUTH TWICE DAILY WITH MEALS 180 tablet 3    escitalopram oxalate (LEXAPRO) 20 MG tablet Take 1 tablet (20 mg total) by mouth once daily. 90 tablet 3    LORazepam (ATIVAN) 1 MG tablet TAKE 1 TABLET BY MOUTH EVERY NIGHT AT BEDTIME AS NEEDED FOR INSOMNIA 30 tablet 1    melatonin 5 mg Tab Take 1 tablet by mouth nightly as needed.      oxyCODONE-acetaminophen (PERCOCET) 5-325 mg per tablet Take 1 tablet by mouth every 4 (four) hours as needed for Pain. 20 tablet 0    TURMERIC ORAL Take 1 tablet by mouth once daily.      valsartan-hydrochlorothiazide (DIOVAN-HCT) 320-25 mg per tablet TAKE 1 TABLET BY MOUTH EVERY DAY 90 tablet 1       Past Surgical History:   Procedure Laterality Date    HYSTERECTOMY         Social History     Socioeconomic History    Marital status:      Spouse name: Not on file    Number of children: Not on file    Years of education: Not on file    Highest education level: Not on file   Occupational History    Not on file   Social Needs    Financial resource strain: Not on file    Food insecurity     Worry: Not on file     Inability: Not on file    Transportation needs     Medical: Not on file     Non-medical: Not on file   Tobacco Use    Smoking status: Never Smoker    Smokeless tobacco: Never Used   Substance and Sexual Activity    Alcohol use: Yes    Drug use: No    Sexual activity: Yes   Lifestyle    Physical activity     Days  per week: Not on file     Minutes per session: Not on file    Stress: Not on file   Relationships    Social connections     Talks on phone: Not on file     Gets together: Not on file     Attends Rastafarian service: Not on file     Active member of club or organization: Not on file     Attends meetings of clubs or organizations: Not on file     Relationship status: Not on file   Other Topics Concern    Not on file   Social History Narrative    Not on file       Anesthesia Evaluation    I have reviewed the Patient Summary Reports.   I have reviewed the NPO Status.   I have reviewed the Medications.     Review of Systems  Anesthesia Hx:  No problems with previous Anesthesia  History of prior surgery of interest to airway management or planning:  Denies Personal Hx of Anesthesia complications.   Social:  Non-Smoker    Cardiovascular:   Hypertension, well controlled    Pulmonary:  Pulmonary Normal    Hepatic/GI:   Hepatitis, B    Neurological:  Neurology Normal right spastic hemiparesis/cerebral palsy following infectious encephalitis at age 6   Endocrine:  Endocrine Normal        Physical Exam  General:  Well nourished    Airway/Jaw/Neck:  Airway Findings: Mouth Opening: Normal Tongue: Normal  General Airway Assessment: Adult  Improves to II with phonation.  TM Distance: 4 - 6 cm      Dental:  Dental Findings: In tact        Mental Status:  Mental Status Findings:  Cooperative, Alert and Oriented              Anesthesia Plan  Type of Anesthesia, risks & benefits discussed:  Anesthesia Type:  general, regional  Patient's Preference:   Intra-op Monitoring Plan: standard ASA monitors  Intra-op Monitoring Plan Comments:   Post Op Pain Control Plan:   Post Op Pain Control Plan Comments:   Induction:   IV  Beta Blocker:  Patient is not currently on a Beta-Blocker (No further documentation required).       Informed Consent: Patient understands risks and agrees with Anesthesia plan.  Questions answered. Anesthesia consent  signed with patient.  ASA Score: 3     Day of Surgery Review of History & Physical:    H&P update referred to the surgeon.         Ready For Surgery From Anesthesia Perspective.

## 2020-09-04 NOTE — TRANSFER OF CARE
"Anesthesia Transfer of Care Note    Patient: Julissa Singletary    Procedure(s) Performed: Procedure(s) (LRB):  MASTECTOMY, PARTIAL RIGHT WITH REFLECTOR (Right)  BIOPSY, LYMPH NODE, SENTINEL RIGHT (Right)    Patient location: PACU    Anesthesia Type: general    Transport from OR: Transported from OR on 6-10 L/min O2 by face mask with adequate spontaneous ventilation    Post pain: adequate analgesia    Post assessment: no apparent anesthetic complications    Post vital signs: stable    Level of consciousness: awake and sedated    Nausea/Vomiting: no nausea/vomiting    Complications: none    Transfer of care protocol was followed      Last vitals:   Visit Vitals  BP (!) 158/93 (BP Location: Left arm, Patient Position: Lying)   Pulse 85   Temp 37.2 °C (98.9 °F) (Oral)   Resp 16   Ht 5' 2" (1.575 m)   Wt 90.7 kg (200 lb)   LMP  (LMP Unknown)   SpO2 100%   Breastfeeding No   BMI 36.58 kg/m²     "

## 2020-09-04 NOTE — ANESTHESIA PROCEDURE NOTES
Intubation  Performed by: Shara Lassiter CRNA  Authorized by: Lore Eaton MD     Intubation:     Induction:  Intravenous    Intubated:  Postinduction    Mask Ventilation:  Easy mask    Attempts:  1    Attempted By:  CRNA    Difficult Airway Encountered?: No      Complications:  None    Airway Device:  Supraglottic airway/LMA    Airway Device Size:  3.5    Style/Cuff Inflation:  Cuffed    Inflation Amount (mL):  0    Secured at:  The lips    Placement Verified By:  Capnometry    Complicating Factors:  None    Findings Post-Intubation:  BS equal bilateral

## 2020-09-05 NOTE — OP NOTE
Operative Note     9/4/2020    PRE-OP DIAGNOSIS: Malignant neoplasm of upper-inner quadrant of right breast in female, estrogen receptor positive [C50.211, Z17.0]      POST-OP DIAGNOSIS: Post-Op Diagnosis Codes:     * Malignant neoplasm of upper-inner quadrant of right breast in female, estrogen receptor positive [C50.211, Z17.0]    Procedure(s):  MASTECTOMY, PARTIAL RIGHT WITH REFLECTOR  BIOPSY, LYMPH NODE, SENTINEL RIGHT     SURGEON: Surgeon(s) and Role:     * Mike Haro MD - Primary    ANESTHESIA: General     OPERATIVE FINDINGS:  Right breast partial mastectomy and axillary sentinel lymph node biopsies x2 performed    INDICATION FOR PROCEDURE:  Invasive carcinoma of the right breast    PROCEDURE IN DETAIL:  Julissa Singletary is a 69 y.o. female brought to the operating room for definitive surgery.  The patient was informed of the possible risks and complications of the procedure, including but not limited to anesthetic risks, bleeding, infection, and need for additional surgery.  The patient concurred with the proposed plan, and has given informed consent.  The site of surgery was properly noted/marked in the preoperative holding area.       The patient was seen in the preop holding area and the right breast was marked.  She underwent regional anesthesia and a block by the regional anesthesia team preoperatively.  She was brought to the operating room where under general anesthesia she was in a supine position.  The right breast was injected in the anterior subareolar region with both blue dye and radio colloid for sentinel lymph node mapping and identification.  The right breast and right axillary regions were prepped and draped in a sterile fashion.  We initially noted the gamma probe activity in the axilla so we performed the sentinel lymph node biopsies 1st.  A small transverse inferior axillary incision was made and we dissected down to the clavipectoral fascia which was opened.  We found 2 hot  blue radioactive sentinel nodes in the level 1 region.  The 1st 1 was hot and blue with an ex Vivo count of about 4000 and the 2nd 1 was hot and blue with ex Vivo count of about 1500.  They both were sent to pathology for permanent sectioning.  Once the 2 nodes were removed there was no residual radio activity and no further blue dye noted in the axilla.  The background radio activity counts were negligible and insignificant indicating adequate sentinel lymph node mapping and identification for axillary staging.  The axillary sentinel nodes were removed using the Harmonic scalpel focus device to minimize lymphatic disruption.  The clavipectoral fascia was reapproximated with Vicryl sutures and the deep dermal and subcutaneous layers were closed with 3 0 Vicryl sutures well and skin was closed with a running 4 Monocryl subcuticular skin closure.    Attention was turned to the superior right breast were reused the MERLE  to identify the area of interest.  A superior transverse right breast incision was made over the area of interest and we circumferentially dissected around the area of interest using the MERLE  probe intermittently.  The dissection was carried down to and including the pectoralis fascia off of the pectoralis muscle.  We maintained grossly about 1 cm margins around the area of interest using the MERLE  probe intermittently.  The standard orientation with multi colored Inks was performed.  Specimen radiograph confirmed the area of interest and biopsy marker within the central aspect of the specimen indicating adequate gross excision.  The lumpectomy cavity was made hemostatic with cautery and irrigated.  With no evidence of bleeding the subcutaneous tissue and deep dermal layers were closed with Vicryl suture and the skin was closed with a running 4 Monocryl subcuticular skin closure.    Sterile skin Dermabond was applied to both sites.  No drains were placed.  She was turned over anesthesia  for extubation and transfer to the recovery area in a satisfactory condition.  All specimens were sent to pathology for permanent section thank you.    ESTIMATED BLOOD LOSS: Minimal    COMPLICATIONS: none    DISPOSITION: PACU - hemodynamically stable.    ATTESTATION:   I was present and scrubbed for the entire procedure.

## 2020-09-06 NOTE — ADDENDUM NOTE
Addendum  created 09/06/20 0653 by Brain Ortiz MD    Attestation recorded in Intraprocedure, Clinical Note Signed, Intraprocedure Attestations filed, Intraprocedure Blocks edited

## 2020-09-08 NOTE — ANESTHESIA POSTPROCEDURE EVALUATION
Anesthesia Post Evaluation    Patient: Julissa Singletary    Procedure(s) Performed: Procedure(s) (LRB):  MASTECTOMY, PARTIAL RIGHT WITH REFLECTOR (Right)  BIOPSY, LYMPH NODE, SENTINEL RIGHT (Right)    Final Anesthesia Type: general    Patient location during evaluation: PACU  Patient participation: Yes- Able to Participate  Level of consciousness: awake and alert  Post-procedure vital signs: reviewed and stable  Pain management: adequate  Airway patency: patent    PONV status at discharge: No PONV  Anesthetic complications: no      Cardiovascular status: hemodynamically stable  Respiratory status: room air, spontaneous ventilation and unassisted  Hydration status: euvolemic  Follow-up not needed.        Vitals Value Taken Time   /71 09/04/20 1245   Temp 36.6 °C (97.8 °F) 09/04/20 1245   Pulse 78 09/04/20 1245   Resp 22 09/04/20 1245   SpO2 99 % 09/04/20 1245         Event Time   Out of Recovery 13:01:16         Pain/Micaela Score: No data recorded

## 2020-09-09 LAB
COMMENT: NORMAL
FINAL PATHOLOGIC DIAGNOSIS: NORMAL
GROSS: NORMAL

## 2020-09-10 ENCOUNTER — DOCUMENTATION ONLY (OUTPATIENT)
Dept: SURGERY | Facility: CLINIC | Age: 70
End: 2020-09-10

## 2020-09-10 NOTE — NURSING
Called patient and scheduled appt for medical and radiation oncology appts. Both booked for 9/21/2020. Reviewed location of appts. Patient verbalized understanding. Oncotype ordered, order number [id number].  Oncology Navigation   Intake  Date of Diagnosis: 08/07/20  Cancer Type: Breast  MD Assigned: Dr. Haro  Internal / External Referral: Internal  Initial Nurse Navigator Contact: 08/12/20  Diagnosis to Initial Contact Timeline (days): 5 days  Contact Method: Phone  Date Worked: 08/12/20  First Appointment Available: 08/13/20  Appointment Date: 08/13/20  Schedule to Appointment Timeline (days): 1  First Available Date vs. Scheduled Date (days): 0     Treatment  Current Status: Active  Treatment Type(s): Surgery  Surgery: right partial mastectomy  Surgeon Name: Dr. Haro  Surgery Schedule Date: 09/04/20    Radiation Oncologist: Dr. Boyd    Procedures: Genomic testing    General Referrals: Radiation oncology;Other (Dr. Boyd 9/21/2020)  Other Referral: Dr. Humphreys 9/21/2020    ER: Positive  CO: Positive     Acuity      Follow Up  Follow up in about 2 weeks (around 9/24/2020) for f/u on appts and oncotype.

## 2020-09-14 ENCOUNTER — TELEPHONE (OUTPATIENT)
Dept: SURGERY | Facility: CLINIC | Age: 70
End: 2020-09-14

## 2020-09-14 NOTE — TELEPHONE ENCOUNTER
Returned patient call regarding the message below.  Patient is rescheduled to be seen on Tuesday 9/22/2020, 9:30 am with .  Patient voiced understanding of appointment date, time, and location.  Reminder letter mailed to the patient.      ----- Message from Violeta Carpenter sent at 9/14/2020  9:38 AM CDT -----  Pt is calling about her appointment on 9/15/2020 pt stated she received a leeter saying the Doctor is no longer with [facility] please reach out to pt at [phone]

## 2020-09-15 ENCOUNTER — TELEPHONE (OUTPATIENT)
Dept: SURGERY | Facility: CLINIC | Age: 70
End: 2020-09-15

## 2020-09-15 NOTE — TELEPHONE ENCOUNTER
Returned patient call regarding the message below.  The patient is rescheduled to be seen on Thursday 9/17/2020, 9:30 am with .  Patient voiced understanding of appointment date, time, and location.       ----- Message from Tito Delgadillo RN sent at 9/15/2020 11:18 AM CDT -----  Regarding: FW: call back from [facility]    ----- Message -----  From: Peter Lynch  Sent: 9/15/2020  11:11 AM CDT  To: Estrellita FRANK Staff  Subject: call back from Bent[facility]                                       The Pt states that she would like for Bentley to call her back.    Phone # [phone]

## 2020-09-17 ENCOUNTER — OFFICE VISIT (OUTPATIENT)
Dept: SURGERY | Facility: CLINIC | Age: 70
End: 2020-09-17
Payer: MEDICARE

## 2020-09-17 VITALS
BODY MASS INDEX: 36.8 KG/M2 | TEMPERATURE: 98 F | WEIGHT: 200 LBS | HEART RATE: 80 BPM | SYSTOLIC BLOOD PRESSURE: 139 MMHG | DIASTOLIC BLOOD PRESSURE: 69 MMHG | HEIGHT: 62 IN

## 2020-09-17 DIAGNOSIS — C50.411 MALIGNANT NEOPLASM OF UPPER-OUTER QUADRANT OF RIGHT BREAST IN FEMALE, ESTROGEN RECEPTOR POSITIVE: ICD-10-CM

## 2020-09-17 DIAGNOSIS — Z17.0 MALIGNANT NEOPLASM OF UPPER-OUTER QUADRANT OF RIGHT BREAST IN FEMALE, ESTROGEN RECEPTOR POSITIVE: ICD-10-CM

## 2020-09-17 DIAGNOSIS — C50.911 INVASIVE DUCTAL CARCINOMA OF BREAST, FEMALE, RIGHT: Primary | ICD-10-CM

## 2020-09-17 PROCEDURE — 99024 POSTOP FOLLOW-UP VISIT: CPT | Mod: POP,,, | Performed by: SURGERY

## 2020-09-17 PROCEDURE — 99999 PR PBB SHADOW E&M-EST. PATIENT-LVL III: ICD-10-PCS | Mod: PBBFAC,,, | Performed by: SURGERY

## 2020-09-17 PROCEDURE — 99024 PR POST-OP FOLLOW-UP VISIT: ICD-10-PCS | Mod: POP,,, | Performed by: SURGERY

## 2020-09-17 PROCEDURE — 99999 PR PBB SHADOW E&M-EST. PATIENT-LVL III: CPT | Mod: PBBFAC,,, | Performed by: SURGERY

## 2020-09-17 PROCEDURE — 99213 OFFICE O/P EST LOW 20 MIN: CPT | Mod: PBBFAC | Performed by: SURGERY

## 2020-09-17 NOTE — PROGRESS NOTES
HPI: Patient is a 70 yo F who presents to clinic s/p right breast partial mastectomy with SLNB for IDC ER+/NM+/HER2- Ki67 20 - 30%. Hospital course was uncomplicated. Today she reports resolution of post operative pain. Denies fever, chills, sweating, nausea, vomiting, chest pain, SOB, and purulent drainage from incisions.     O:  There were no vitals filed for this visit.    Physical Exam:  Gen: WDWN, NAD  Breast: Right breast incision clean, healing well, no erythema or fluctuance  Cards: RRR, intact distal pulses  Pulm: Normal rate, not in respiratory distress  Neuro: AAO x 3    Path:  RIGHT BREAST, PARTIAL MASTECTOMY:  - Invasive ductal carcinoma, grade 1, 1.2 cm.  - Biopsy site and marker are identified.  - See CAP synoptic report below.  SURGICAL PATHOLOGY CANCER CASE SUMMARY- Breast  Procedure: Excision (less than mastectomy).  Specimen laterality: Right.  Tumor size: 1.2 cm (greatest dimension, measured grossly).  Histologic type: Invasive ductal carcinoma.  Histologic grade: Grade 1 (glandular/tubular differentiation- 2, nuclear pleomorphism- 2, mitotic rate- 1)  Tumor focality: Unifocal.  Ductal carcinoma in situ: Not identified.  Margins -  Uninvolved by invasive carcinoma, distance from closest margin (posterior) is 9 mm.  Treatment effect: No known presurgical therapy.  Lymphovascular invasion: Not identified.  Lymph nodes (evaluated in concurrent case ELS-):  Total number of sentinel lymph nodes examined: 2.  Total number of lymph nodes examined (sentinel and non-sentinel): 2.  Number of lymph nodes with Macrometastases (>2 mm): 0.  Number of lymph nodes with Micrometastases (> 0.2 mm to 2 mm and/or > 200 cells): 0.  Number of lymph nodes with isolated tumor cells (less than or equal to 0.2 mm and less than or equal to  200 cells): 0.    pT1c (sn)N0    A: Doing well post operatively.    P: Will refer to med onc and rad onc for discussion of further care. Sending oncotype of tumor. Return to  surgery clinic as needed.    Michel Story MD  Surgery, PGYIII  [phone]      I have personally taken the history and examined this patient and agree with the resident's note as stated above.  The patient presents postoperatively for her initial postoperative visit status post right breast conservation surgery of the right breast superior upper outer quadrant with a negative sentinel lymph node biopsy for a grade 1, 1.2 cm T1c invasive ductal carcinoma that was estrogen and progesterone receptor positive and Her 2 duncan negative, with a 9 mm margin..  Incision sites are all clean dry and intact healing appropriately without signs of infection.  She has an excellent symmetrical cosmetic result and offers no subjective complaints    Assessment and plan:  Will refer her to Radiation Oncology to discuss adjuvant radiotherapy following breast conservation surgery.  Will refer her to Medical Oncology for consultation to discuss adjuvant systemic therapy.  The T1c tumor has been sent off for an Oncotype DX study.  Follow-up with a mid-level provider at the breast Center in 6 months.

## 2020-09-18 ENCOUNTER — PATIENT OUTREACH (OUTPATIENT)
Dept: ADMINISTRATIVE | Facility: OTHER | Age: 70
End: 2020-09-18

## 2020-09-18 NOTE — PROGRESS NOTES
LINKS immunization registry updated  Care Everywhere updated  Health Maintenance updated  Chart reviewed for overdue Proactive [facility] Encounters (CHERYLE) health maintenance testing (CRS, Breast Ca, Diabetic Eye Exam)   Orders entered:N/A

## 2020-09-21 ENCOUNTER — OFFICE VISIT (OUTPATIENT)
Dept: SURGERY | Facility: CLINIC | Age: 70
End: 2020-09-21
Payer: MEDICARE

## 2020-09-21 ENCOUNTER — TELEPHONE (OUTPATIENT)
Dept: HEMATOLOGY/ONCOLOGY | Facility: CLINIC | Age: 70
End: 2020-09-21

## 2020-09-21 VITALS
SYSTOLIC BLOOD PRESSURE: 101 MMHG | RESPIRATION RATE: 20 BRPM | HEIGHT: 62 IN | SYSTOLIC BLOOD PRESSURE: 101 MMHG | DIASTOLIC BLOOD PRESSURE: 68 MMHG | WEIGHT: 201.25 LBS | WEIGHT: 201.25 LBS | RESPIRATION RATE: 18 BRPM | BODY MASS INDEX: 37.04 KG/M2 | HEART RATE: 69 BPM | BODY MASS INDEX: 37.04 KG/M2 | DIASTOLIC BLOOD PRESSURE: 68 MMHG | HEART RATE: 69 BPM | HEIGHT: 62 IN

## 2020-09-21 DIAGNOSIS — C50.911 INVASIVE DUCTAL CARCINOMA OF BREAST, FEMALE, RIGHT: Primary | ICD-10-CM

## 2020-09-21 DIAGNOSIS — Z17.0 MALIGNANT NEOPLASM OF UPPER-INNER QUADRANT OF RIGHT BREAST IN FEMALE, ESTROGEN RECEPTOR POSITIVE: Primary | ICD-10-CM

## 2020-09-21 DIAGNOSIS — Z86.61 HISTORY OF ENCEPHALITIS: ICD-10-CM

## 2020-09-21 DIAGNOSIS — C50.211 MALIGNANT NEOPLASM OF UPPER-INNER QUADRANT OF RIGHT BREAST IN FEMALE, ESTROGEN RECEPTOR POSITIVE: Primary | ICD-10-CM

## 2020-09-21 PROBLEM — C50.411 MALIGNANT NEOPLASM OF UPPER-OUTER QUADRANT OF RIGHT BREAST IN FEMALE, ESTROGEN RECEPTOR POSITIVE: Status: ACTIVE | Noted: 2020-08-15

## 2020-09-21 LAB
COMMENT: NORMAL
FINAL PATHOLOGIC DIAGNOSIS: NORMAL
GROSS: NORMAL
SUPPLEMENTAL DIAGNOSIS: NORMAL

## 2020-09-21 PROCEDURE — 99205 PR OFFICE/OUTPT VISIT, NEW, LEVL V, 60-74 MIN: ICD-10-PCS | Mod: S$PBB,,, | Performed by: INTERNAL MEDICINE

## 2020-09-21 PROCEDURE — 99999 PR PBB SHADOW E&M-EST. PATIENT-LVL III: ICD-10-PCS | Mod: PBBFAC,,, | Performed by: INTERNAL MEDICINE

## 2020-09-21 PROCEDURE — 99205 OFFICE O/P NEW HI 60 MIN: CPT | Mod: S$PBB,,, | Performed by: INTERNAL MEDICINE

## 2020-09-21 PROCEDURE — 99999 PR PBB SHADOW E&M-EST. PATIENT-LVL III: CPT | Mod: PBBFAC,,, | Performed by: INTERNAL MEDICINE

## 2020-09-21 PROCEDURE — 99999 PR PBB SHADOW E&M-EST. PATIENT-LVL III: ICD-10-PCS | Mod: PBBFAC,,, | Performed by: RADIOLOGY

## 2020-09-21 PROCEDURE — 99213 OFFICE O/P EST LOW 20 MIN: CPT | Mod: PBBFAC,27 | Performed by: RADIOLOGY

## 2020-09-21 PROCEDURE — 99203 OFFICE O/P NEW LOW 30 MIN: CPT | Mod: S$PBB,,, | Performed by: RADIOLOGY

## 2020-09-21 PROCEDURE — 99999 PR PBB SHADOW E&M-EST. PATIENT-LVL III: CPT | Mod: PBBFAC,,, | Performed by: RADIOLOGY

## 2020-09-21 PROCEDURE — 99203 PR OFFICE/OUTPT VISIT, NEW, LEVL III, 30-44 MIN: ICD-10-PCS | Mod: S$PBB,,, | Performed by: RADIOLOGY

## 2020-09-21 PROCEDURE — 99213 OFFICE O/P EST LOW 20 MIN: CPT | Mod: PBBFAC | Performed by: INTERNAL MEDICINE

## 2020-09-21 NOTE — TELEPHONE ENCOUNTER
Returned call to pt.   Informed pt that oncotype score came back low and no chemo needed.  Informed pt that will proceed with RT and is scheduled for f/u with Dr. Humphreys on 10/26 at 1:40 and provided location details.   Pt verbalized understanding and expressed gratitude.     Message fwd.         ----- Message from Peter Lynch sent at 9/21/2020  3:51 PM CDT -----  Regarding: call bk about appt        The Pt states that she just had her appt today and didn't get her new appt scheduled so that she could come back for a f/u from her test on the tumor.    Phone # [phone]

## 2020-09-21 NOTE — Clinical Note
She will proceed with radiation upfront and we will see her towards the end of October to begin adjuvant hormonal therapy

## 2020-09-21 NOTE — LETTER
September 21, 2020      Mike Haro MD  [address]  [address]nd Floor  Multi-Specialty Clinic  [facility] [address]           [facility]  [address]  [facility] [address]  Phone: [phone]  Fax: [phone]          Patient: Julissa Singletary   MR Number: [id number]   YOB: 1950   Date of Visit: 9/21/2020       Dear Dr. Mike Haro:    Thank you for referring Julissa Singletary to me for evaluation. Attached you will find relevant portions of my assessment and plan of care.    If you have questions, please do not hesitate to call me. I look forward to following Julissa Singletary along with you.    Sincerely,    Scott Boyd Jr., MD    Enclosure  CC:  No Recipients    If you would like to receive this communication electronically, please contact [email or web address] or [phone] to request more information on [employer] Link access.    For providers and/or their staff who would like to refer a patient to [facility], please contact us through our one-stop-shop provider referral line, [facility], at [phone].    If you feel you have received this communication in error or would no longer like to receive these types of communications, please e-mail [email or web address]

## 2020-09-21 NOTE — TELEPHONE ENCOUNTER
----- Message from Echo Martini RN sent at 9/21/2020  3:54 PM CDT -----  Regarding: FW: call liban about appt    ----- Message -----  From: Peter Lynch  Sent: 9/21/2020   3:51 PM CDT  To: Ambrocio Torres Staff  Subject: call liban about appt                                     The Pt states that she just had her appt today and didn't get her new appt scheduled so that she could come back for a f/u from her test on the tumor.    Phone # [phone]

## 2020-09-21 NOTE — LETTER
September 21, 2020      Mike Haro MD  [address]  [address]  Multi-Specialty Clinic  [facility] [address]           [facility] entry  [address]  [facility] [address]  Phone: [phone]  Fax: [phone]          Patient: Julissa Singletary   MR Number: [id number]   YOB: 1950   Date of Visit: 9/21/2020       Dear Dr. Mike Haro:    Thank you for referring Julissa Singletayr to me for evaluation. Attached you will find relevant portions of my assessment and plan of care.    If you have questions, please do not hesitate to call me. I look forward to following Julissa Singletary along with you.    Sincerely,    Brian Albrecht MD    Enclosure  CC:  No Recipients    If you would like to receive this communication electronically, please contact [email or web address] or [phone] to request more information on [employer] Link access.    For providers and/or their staff who would like to refer a patient to [facility], please contact us through our one-stop-shop provider referral line, [facility], at [phone].    If you feel you have received this communication in error or would no longer like to receive these types of communications, please e-mail [email or web address]

## 2020-09-21 NOTE — PROGRESS NOTES
Subjective:       Patient ID: Julissa Singletary is a 69 y.o. female.    Chief Complaint: Consult and Breast Cancer    HPI Mrs Casanova is a 69-year-old postmenopausal female referred for evaluation for a recently diagnosed breast cancer.  Apparently she had an abnormal mammogram on 07/21/2020.  Follow-up imaging few days later showed an irregular mass with indistinct margins at the 1:00 a.m. in the right breast.  A biopsy was performed on 08/07/2020 and showed an infiltrating ductal carcinoma that was ER strongly positive, TN strongly positive and HER2 negative by FISH.  The patient was seen by Dr. Haro and on 09/04/2020 she underwent a lumpectomy and sentinel lymph node biopsy.  The pathology report from the procedure indicates that she had a grade 1 carcinoma that measured 12 mm in greatest diameter.  Two sentinel lymph nodes were negative.  Resection margins were clear.  An Oncotype was sent and the results are not available for me to review.      PAST MEDICAL HISTORY:  As above.  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  Several years ago however, she started developing difficulties with her motor function.  She is currently quite disabled and she has difficulty using the right upper and right lower extremity.  She also has a history of hypertension and hyperlipidemia.  PAST SURGICAL HISTORY:  Significant for a total hysterectomy at age 37.  She denies any dysfunctional uterine bleeding, however, she states that she had fibroid tumors.  SOCIAL HISTORY:  She is disabled and she lives with her .  She does not smoke and she drinks alcohol mainly on the weekends  FAMILY HISTORY:  Her father had prostate cancer in his 60s.  There is no family history of breast or ovarian cancer.  GYN HISTORY:  Menarche was at 12 and she underwent a hysterectomy at 37.  She admits to hormonal  therapy for 1 year followed hysterectomy and also for 10 years she took oral contraceptives.  She is  with 1st live birth at 25.  She did not breastfeed    Review of Systems    Overall she feels well. She has fully recovered from the lumpectomy.  She does complain of occasional hot flashes but she  denies any anxiety, depression, easy bruising, fevers, chills, weight loss, nausea, vomiting, diarrhea, constipation, diplopia,  blurred vision, headache, chest pain, palpitations, shortness of breath, breast pain, or abdominal pain, but she does have difficulty ambulating and uses a cane  The remainder of the ten-point ROS, including general, skin, lymph, H/N, cardiorespiratory, GI, , Neuro, Endocrine, and psychiatric is negative.     Objective:      Physical Exam      She is alert, oriented to time, place, person, pleasant, well      nourished, in no acute physical distress.                                    VITAL SIGNS:  Reviewed                                      HEENT:  Normal.  There are no nasal, oral, lip, gingival, auricular, lid,    or conjunctival lesions.  Mucosae are moist and pink, and there is no        thrush.  Pupils are equal, reactive to light and accommodation.              Extraocular muscle movements are intact.  Dentition is good.  There is no frontal or maxillary tenderness.                                     NECK:  Supple without JVD, adenopathy, or thyromegaly.                       LUNGS:  Clear to auscultation without wheezing, rales, or rhonchi.           CARDIOVASCULAR:  Reveals an S1, S2, no murmurs, no rubs, no gallops.         ABDOMEN:  Soft, nontender, without organomegaly.  Bowel sounds are    present.                                                                     EXTREMITIES:  No cyanosis, clubbing, or edema.   she has a club right foot and her right upper extremity is somewhat contracted.  She uses a cane for ambulation                              BREASTS:  She is  status post right lumpectomy with a well-healed incision at the 9 o'clock position.  There is a right axillary sentinel lymph node biopsy incision which is also well healed.     There are no masses in either breast.     A sentinel node biopsy scar is seen in the left axilla.  It is also well  healed.                                     LYMPHATIC:  There is no cervical, axillary, or supraclavicular adenopathy.   SKIN:  Warm and moist, without petechiae, rashes, induration, or ecchymoses.           NEUROLOGIC:  DTRs are 0-1+ bilaterally, symmetrical, motor function is 5/5,  and cranial nerves are  within normal limits.      Assessment:       1. Invasive ductal carcinoma of breast, female, right    2. History of encephalitis, now with significant neurological it motor deficits.         Plan:         I had a long discussion with her.  She had a 1.2 cm node-negative grade 1 carcinoma and she is status post lumpectomy.  She will benefit from radiation therapy and adjuvant hormonal therapy.  Whether she benefits from chemotherapy will depend on her Oncotype score.  The test has been sent, however, results are not available today.  I suspect that she will be a low score given the fact that this was a grade 1 tumor.  In the unlikely event that she needs chemotherapy, I will be inclined to recommend CMF rather than TC given her multiple comorbidities and tenuous performance status.  I spent approximately 60 minutes reviewing the available records and evaluating the patient, out of which over 50% of the time was spent face to face with the patient in counseling and coordinating this patient's care.  Her multiple questions were answered to her satisfaction.  I have asked her to return in 10 days for follow-up.     3:01 p.m. ADDENDUM  Oncotype score was 2.  This suggests that she has a 97% chance of disease-free survival at 9 years with aromatase inhibitors.  The benefit of chemotherapy is less than 1%.  In view of the above, we  will cancel her appointment 10 days from now and I will see her upon completion of radiation therapy, in late October.  I have communicated with Dr. Boyd to alert him that she may go ahead and start radiation therapy.  Hopefully she will be able to begin adjuvant hormonal therapy by November 1st.

## 2020-09-22 NOTE — PROGRESS NOTES
HISTORY OF PRESENT ILLNESS:   This patient presents for discussion of adjuvant radiotherapy for a recently diagnosed Rt. breast cancer.      Mrs. Singletary was referred for further evaluation after Diagnostic MMG confirmed an irregular high-density mass indistinct margins measuring 1.0 cm in the UIQ of the Rt. breast.  Ultrasound confirmed the finding with a hypoechoic mass with indistinct margins measuring 0.6 cm. Associated posterior acoustic shadowing and rim hypervascularity. This corresponds to the right breast mammographic finding.  There was no right axillary adenopathy. Ultrasound guided biopsy 8/7/20 revealed infiltrating ductal carcinoma, histologic grade 3, nuclear grade 1 and mitotic index 1.  100% of the cells were strongly ER and VT positive,  Her- 2 was 2+ but negative by FISH.  Ki-67 was 20 - 30%.  The patient was referred to Dr. Haro and underwent partial mastectomy with sentinel node biopsy on 9/4/20.  Pathology confirmed a 1.2 cm focus of infiltrating ductal carcinoma, histologic grade 2, nuclear grade 2 and mitotic index 1.  There was no associated DCIS or lymphovascular invasion.  The margins of resection were negative (closest margin was 9 mm.).  Two sentinel nodes were negative for tumor involvement.  The patient presents for discussion of adjuvant therapy.  Today the patient states she feels well.  Still with some tenderness of the breast and in the Rt. axilla.         REVIEW OF SYSTEMS:   Review of Systems   Constitutional: Negative for chills, fever, malaise/fatigue and weight loss.   Respiratory: Negative for cough, sputum production and shortness of breath.    Cardiovascular: Negative for chest pain and palpitations.   Gastrointestinal: Negative for abdominal pain, nausea and vomiting.       GYN HISTORY:  Age of menarche was 12. Age of menopause was 37 due to hysterectomy.  Patient admits to hormonal therapy for one year following hysterectomy as well as approximately 10 years of  oral contraceptive use prior. Patient is . Age of first live birth was 25. Patient did not breast feed.    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        PAST SURGICAL HISTORY:  Past Surgical History:   Procedure Laterality Date    HYSTERECTOMY      MASTECTOMY, PARTIAL Right 2020    Procedure: MASTECTOMY, PARTIAL RIGHT WITH REFLECTOR;  Surgeon: Mike Haro MD;  Location: [facility] OR;  Service: General;  Laterality: Right;    SENTINEL LYMPH NODE BIOPSY Right 2020    Procedure: BIOPSY, LYMPH NODE, SENTINEL RIGHT;  Surgeon: Mike Haro MD;  Location: [facility] OR;  Service: General;  Laterality: Right;       ALLERGIES:   Review of patient's allergies indicates:  No Known Allergies    MEDICATIONS:  Current Outpatient Medications   Medication Sig    alendronate (FOSAMAX) 70 MG tablet TAKE ONE TABLET BY MOUTH EVERY 7 DAYS    atorvastatin (LIPITOR) 20 MG tablet TAKE 1 TABLET BY MOUTH ONCE DAILY    calcium carbonate (OS-BEBE) 600 mg calcium (1,500 mg) Tab Take 600 mg by mouth once.    carvedilol (COREG) 6.25 MG tablet TAKE 1 TABLET(6.25 MG) BY MOUTH TWICE DAILY WITH MEALS    escitalopram oxalate (LEXAPRO) 20 MG tablet Take 1 tablet (20 mg total) by mouth once daily.    HYDROcodone-acetaminophen (NORCO) 5-325 mg per tablet Take 1 tablet by mouth every 6 (six) hours as needed for Pain (Do not drive after taking this medication.).    HYDROcodone-acetaminophen (NORCO) 5-325 mg per tablet Take 1 tablet by mouth every 6 (six) hours as needed for Pain. (Patient not taking: Reported on 2020)    LORazepam (ATIVAN) 1 MG tablet TAKE 1 TABLET BY MOUTH EVERY NIGHT AT BEDTIME AS NEEDED FOR INSOMNIA    melatonin 5 mg Tab Take 1 tablet by mouth nightly as needed.    oxyCODONE-acetaminophen (PERCOCET) 5-325 mg per tablet Take 1 tablet by mouth every 4 (four)  hours as needed for Pain. (Patient not taking: Reported on 9/21/2020)    TURMERIC ORAL Take 1 tablet by mouth once daily.    valsartan-hydrochlorothiazide (DIOVAN-HCT) 320-25 mg per tablet TAKE 1 TABLET BY MOUTH EVERY DAY     No current facility-administered medications for this visit.      Facility-Administered Medications Ordered in Other Visits   Medication    0.9%  NaCl infusion    0.9%  NaCl infusion    ceFAZolin injection 2 g    fentaNYL injection 25 mcg    midazolam (VERSED) 1 mg/mL injection 0.5 mg       SOCIAL HISTORY:  Social History     Socioeconomic History    Marital status:      Spouse name: Not on file    Number of children: Not on file    Years of education: Not on file    Highest education level: Not on file   Occupational History    Not on file   Social Needs    Financial resource strain: Not on file    Food insecurity     Worry: Not on file     Inability: Not on file    Transportation needs     Medical: Not on file     Non-medical: Not on file   Tobacco Use    Smoking status: Never Smoker    Smokeless tobacco: Never Used   Substance and Sexual Activity    Alcohol use: Yes     Comment: 3-4 glasses of wine on Sundays, 1 pint of Lucio Beam w/coke  during week     Drug use: No    Sexual activity: Not Currently   Lifestyle    Physical activity     Days per week: Not on file     Minutes per session: Not on file    Stress: Not on file   Relationships    Social connections     Talks on phone: Not on file     Gets together: Not on file     Attends Jainism service: Not on file     Active member of club or organization: Not on file     Attends meetings of clubs or organizations: Not on file     Relationship status: Not on file   Other Topics Concern    Not on file   Social History Narrative    Not on file       FAMILY HISTORY:  Family History   Problem Relation Age of Onset    Diabetes Mother     Heart disease Mother     Heart disease Father     No Known Problems Brother      No Known Problems Maternal Aunt     No Known Problems Maternal Uncle     No Known Problems Paternal Aunt     No Known Problems Paternal Uncle     No Known Problems Maternal Grandmother     No Known Problems Maternal Grandfather     No Known Problems Paternal Grandmother     No Known Problems Paternal Grandfather     No Known Problems Brother     No Known Problems Daughter     No Known Problems Son     Amblyopia Neg Hx     Blindness Neg Hx     Cancer Neg Hx     Cataracts Neg Hx     Glaucoma Neg Hx     Hypertension Neg Hx     Macular degeneration Neg Hx     Retinal detachment Neg Hx     Strabismus Neg Hx     Stroke Neg Hx     Thyroid disease Neg Hx          PHYSICAL EXAMINATION:  Vitals:    20 1314   BP: 101/68   Pulse: 69   Resp: 18     Physical Exam   Constitutional: She is oriented to person, place, and time and well-developed, well-nourished, and in no distress.   Pulmonary/Chest: Effort normal. No respiratory distress. Right breast exhibits no inverted nipple, no mass, no nipple discharge and no skin change.       Abdominal: Soft. She exhibits no distension. There is no abdominal tenderness.   Neurological: She is alert and oriented to person, place, and time.   Psychiatric: Affect and judgment normal.       ASSESSMENT/PLAN:  Stage IA (pT1c, pN0, cM0, G1, ER+, NC+, HER2-, Oncotype DX score: 2) infiltrating ductal carcinoma of the UIQ of the Rt. breast.       ECO    I discussed the rational for adjuvant radiotherapy in this setting.  We discussed the procedures, risks and benefits of therapy.  Discussed the methods of delivering her radiotherapy.  Explained the timing of therapy would depend on the results of her Oncotype score.  During the visit, her Oncotype DX score returned at 2.  Given this we will plan to proceed with radiotherapy followed by hormonal therapy.      I spent approximately 60 minutes reviewing the available records and evaluating the patient, out of which  over 50% of the time was spent face to face with the patient in counseling and coordinating this patient's care.

## 2020-10-12 ENCOUNTER — HOSPITAL ENCOUNTER (OUTPATIENT)
Dept: RADIATION THERAPY | Facility: HOSPITAL | Age: 70
Discharge: HOME | End: 2020-10-12
Attending: RADIOLOGY
Payer: MEDICARE

## 2020-10-12 PROCEDURE — 77332 RADIATION TREATMENT AID(S): CPT | Mod: TC | Performed by: RADIOLOGY

## 2020-10-12 PROCEDURE — 77290 THER RAD SIMULAJ FIELD CPLX: CPT | Mod: TC | Performed by: RADIOLOGY

## 2020-10-12 PROCEDURE — 77263 THER RADIOLOGY TX PLNG CPLX: CPT | Mod: ,,, | Performed by: RADIOLOGY

## 2020-10-12 PROCEDURE — 77332 RADIATION TREATMENT AID(S): CPT | Mod: 26,,, | Performed by: RADIOLOGY

## 2020-10-12 PROCEDURE — 77290 PR  SET RADN THERAPY FIELD COMPLEX: ICD-10-PCS | Mod: 26,,, | Performed by: RADIOLOGY

## 2020-10-12 PROCEDURE — 77263 PR  RADIATION THERAPY PLAN COMPLEX: ICD-10-PCS | Mod: ,,, | Performed by: RADIOLOGY

## 2020-10-12 PROCEDURE — 77332 PR  RADN TREATMENT AID(S) SIMPLE: ICD-10-PCS | Mod: 26,,, | Performed by: RADIOLOGY

## 2020-10-12 PROCEDURE — 77290 THER RAD SIMULAJ FIELD CPLX: CPT | Mod: 26,,, | Performed by: RADIOLOGY

## 2020-10-12 PROCEDURE — 77014 HC CT GUIDANCE RADIATION THERAPY FLDS PLACEMENT: CPT | Mod: TC | Performed by: RADIOLOGY

## 2020-10-13 ENCOUNTER — TELEPHONE (OUTPATIENT)
Dept: UROGYNECOLOGY | Facility: CLINIC | Age: 70
End: 2020-10-13

## 2020-10-13 ENCOUNTER — OFFICE VISIT (OUTPATIENT)
Dept: UROGYNECOLOGY | Facility: CLINIC | Age: 70
End: 2020-10-13
Payer: MEDICARE

## 2020-10-13 VITALS
DIASTOLIC BLOOD PRESSURE: 80 MMHG | WEIGHT: 204.13 LBS | HEIGHT: 62 IN | SYSTOLIC BLOOD PRESSURE: 144 MMHG | BODY MASS INDEX: 37.56 KG/M2

## 2020-10-13 DIAGNOSIS — Z01.419 WELL WOMAN EXAM: Primary | ICD-10-CM

## 2020-10-13 DIAGNOSIS — R06.09 DYSPNEA ON EXERTION: ICD-10-CM

## 2020-10-13 DIAGNOSIS — B37.2 YEAST DERMATITIS: ICD-10-CM

## 2020-10-13 PROCEDURE — 99999 PR PBB SHADOW E&M-EST. PATIENT-LVL V: ICD-10-PCS | Mod: PBBFAC,,, | Performed by: NURSE PRACTITIONER

## 2020-10-13 PROCEDURE — 99215 OFFICE O/P EST HI 40 MIN: CPT | Mod: PBBFAC | Performed by: NURSE PRACTITIONER

## 2020-10-13 PROCEDURE — G0101 CA SCREEN;PELVIC/BREAST EXAM: HCPCS | Mod: S$PBB,,, | Performed by: NURSE PRACTITIONER

## 2020-10-13 PROCEDURE — 99999 PR PBB SHADOW E&M-EST. PATIENT-LVL V: CPT | Mod: PBBFAC,,, | Performed by: NURSE PRACTITIONER

## 2020-10-13 PROCEDURE — G0101 CA SCREEN;PELVIC/BREAST EXAM: HCPCS | Mod: PBBFAC | Performed by: NURSE PRACTITIONER

## 2020-10-13 PROCEDURE — G0101 PR CA SCREEN;PELVIC/BREAST EXAM: ICD-10-PCS | Mod: S$PBB,,, | Performed by: NURSE PRACTITIONER

## 2020-10-13 RX ORDER — FLUCONAZOLE 150 MG/1
150 TABLET ORAL DAILY
Qty: 1 TABLET | Refills: 1 | Status: SHIPPED | OUTPATIENT
Start: 2020-10-13 | End: 2020-10-14

## 2020-10-13 RX ORDER — CLOTRIMAZOLE AND BETAMETHASONE DIPROPIONATE 10; .64 MG/G; MG/G
CREAM TOPICAL
Qty: 45 G | Refills: 3 | Status: SHIPPED | OUTPATIENT
Start: 2020-10-13 | End: 2021-02-02

## 2020-10-13 RX ORDER — FLUCONAZOLE 150 MG/1
TABLET ORAL
Qty: 6 TABLET | Refills: 0 | Status: SHIPPED | OUTPATIENT
Start: 2020-10-13 | End: 2021-02-02

## 2020-10-13 NOTE — TELEPHONE ENCOUNTER
----- Message from Shreya Montgomery sent at 10/13/2020  2:41 PM CDT -----   Name of Who is Calling:     What is the request in detail: patient request call back in reference to medication  fluconazole (DIFLUCAN) 150 MG Tab//patient state only received one from pharmacy and patient state she was to get 6   Please contact to further discuss and advise      Can the clinic reply by [facility]: no     What Number to Call Back if not in [facility]:  [phone]

## 2020-10-13 NOTE — PROGRESS NOTES
10/13/2020    SUBJECTIVE:   69 y.o. female for annual exam.    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        Past Surgical History:   Procedure Laterality Date    HYSTERECTOMY      MASTECTOMY, PARTIAL Right 9/4/2020    Procedure: MASTECTOMY, PARTIAL RIGHT WITH REFLECTOR;  Surgeon: Mike Haro MD;  Location: [facility];  Service: General;  Laterality: Right;    SENTINEL LYMPH NODE BIOPSY Right 9/4/2020    Procedure: BIOPSY, LYMPH NODE, SENTINEL RIGHT;  Surgeon: Mike Haro MD;  Location: [facility] OR;  Service: General;  Laterality: Right;       Family History   Problem Relation Age of Onset    Diabetes Mother     Heart disease Mother     Heart disease Father     No Known Problems Brother     No Known Problems Maternal Aunt     No Known Problems Maternal Uncle     No Known Problems Paternal Aunt     No Known Problems Paternal Uncle     No Known Problems Maternal Grandmother     No Known Problems Maternal Grandfather     No Known Problems Paternal Grandmother     No Known Problems Paternal Grandfather     No Known Problems Brother     No Known Problems Daughter     No Known Problems Son     Amblyopia Neg Hx     Blindness Neg Hx     Cancer Neg Hx     Cataracts Neg Hx     Glaucoma Neg Hx     Hypertension Neg Hx     Macular degeneration Neg Hx     Retinal detachment Neg Hx     Strabismus Neg Hx     Stroke Neg Hx     Thyroid disease Neg Hx        Social History     Socioeconomic History    Marital status:      Spouse name: Not on file    Number of children: Not on file    Years of education: Not on file    Highest education level: Not on file   Occupational History    Not on file   Social Needs    Financial resource strain: Not on file    Food insecurity     Worry: Not on file     Inability: Not on file    Transportation needs     Medical: Not on  file     Non-medical: Not on file   Tobacco Use    Smoking status: Never Smoker    Smokeless tobacco: Never Used   Substance and Sexual Activity    Alcohol use: Yes     Comment: 3-4 glasses of wine on Sundays, 1 pint of Lucio Beam w/coke  during week     Drug use: No    Sexual activity: Not Currently   Lifestyle    Physical activity     Days per week: Not on file     Minutes per session: Not on file    Stress: Not on file   Relationships    Social connections     Talks on phone: Not on file     Gets together: Not on file     Attends Restoration service: Not on file     Active member of club or organization: Not on file     Attends meetings of clubs or organizations: Not on file     Relationship status: Not on file   Other Topics Concern    Not on file   Social History Narrative    Not on file       Current Outpatient Medications   Medication Sig Dispense Refill    alendronate (FOSAMAX) 70 MG tablet TAKE ONE TABLET BY MOUTH EVERY 7 DAYS 12 tablet 3    atorvastatin (LIPITOR) 20 MG tablet TAKE 1 TABLET BY MOUTH ONCE DAILY 90 tablet 3    calcium carbonate (OS-BEBE) 600 mg calcium (1,500 mg) Tab Take 600 mg by mouth once.      carvedilol (COREG) 6.25 MG tablet TAKE 1 TABLET(6.25 MG) BY MOUTH TWICE DAILY WITH MEALS 180 tablet 3    melatonin 5 mg Tab Take 1 tablet by mouth nightly as needed.      TURMERIC ORAL Take 1 tablet by mouth once daily.      valsartan-hydrochlorothiazide (DIOVAN-HCT) 320-25 mg per tablet TAKE 1 TABLET BY MOUTH EVERY DAY 90 tablet 1    clotrimazole-betamethasone 1-0.05% (LOTRISONE) cream Apply to groin and labial folds twice daily x 3 weeks 45 g 3    escitalopram oxalate (LEXAPRO) 20 MG tablet Take 1 tablet (20 mg total) by mouth once daily. 90 tablet 3    fluconazole (DIFLUCAN) 150 MG Tab Take 1 tablet (150 mg total) by mouth once daily. for 1 day 1 tablet 1    fluconazole (DIFLUCAN) 150 MG Tab Take one tablet every other day x 3 doses, then once weekly x 3 weeks 6 tablet 0     HYDROcodone-acetaminophen (NORCO) 5-325 mg per tablet Take 1 tablet by mouth every 6 (six) hours as needed for Pain (Do not drive after taking this medication.). (Patient not taking: Reported on 10/13/2020) 21 tablet 0    HYDROcodone-acetaminophen (NORCO) 5-325 mg per tablet Take 1 tablet by mouth every 6 (six) hours as needed for Pain. (Patient not taking: Reported on 2020) 21 tablet 0    LORazepam (ATIVAN) 1 MG tablet TAKE 1 TABLET BY MOUTH EVERY NIGHT AT BEDTIME AS NEEDED FOR INSOMNIA (Patient not taking: Reported on 10/13/2020) 30 tablet 1    oxyCODONE-acetaminophen (PERCOCET) 5-325 mg per tablet Take 1 tablet by mouth every 4 (four) hours as needed for Pain. (Patient not taking: Reported on 2020) 20 tablet 0     No current facility-administered medications for this visit.      Facility-Administered Medications Ordered in Other Visits   Medication Dose Route Frequency Provider Last Rate Last Dose    0.9%  NaCl infusion   Intravenous Continuous Shara Wright MD   Stopped at 20 1109    0.9%  NaCl infusion   Intravenous Continuous Michel Story MD 70 mL/hr at 20 0832      ceFAZolin injection 2 g  2 g Intravenous On Call Procedure Michel Story MD        fentaNYL injection 25 mcg  25 mcg Intravenous Q5 Min PRN Jorge L Ibanez MD   50 mcg at 20 0842    midazolam (VERSED) 1 mg/mL injection 0.5 mg  0.5 mg Intravenous PRN Jorge L Ibanez MD   1 mg at 20 0842       Review of patient's allergies indicates:  No Known Allergies    No LMP recorded (lmp unknown). Patient has had a hysterectomy.       Vaginal deliveries    Well Woman:  Pap:post hysterecotmy--still has partial ovary  Mammo:RIGHT BREAST, PARTIAL MASTECTOMY:   - Invasive ductal carcinoma, grade 1, 1.2 cm.  --getting ready to start radiation  Colonoscopy:report normal-- overdue  Dexa:reports osteoporosis-- taking calcium, vitamin D3, and fosamax-- managed by pcp    OB  "History        2    Para   2    Term   2            AB        Living           SAB        TAB        Ectopic        Multiple        Live Births                       ROS:  Feeling well.   Complains of dyspnea with climbing stairs, denies chest pain     No abdominal pain, change in bowel habits, black or bloody stools.  Has frequent diarrhea  No urinary tract symptoms.   GYN ROS: no breast pain or new or enlarging lumps on self exam, she complains of vulvar irritation.   No neurological complaints.    OBJECTIVE:   The patient appears well, alert, oriented x 3, in no distress.  BP (!) 144/80 (BP Location: Left arm, Patient Position: Sitting, BP Method: Large (Manual))   Ht 5' 2" (1.575 m)   Wt 92.6 kg (204 lb 2.3 oz)   LMP  (LMP Unknown)   BMI 37.34 kg/m²   ENT normal.  Neck supple. No adenopathy or thyromegaly. JOSE D.   Lungs are clear, good air entry, no wheezes, rhonchi or rales.   S1 and S2 normal, no murmurs, regular rate and rhythm.   Abdomen soft without tenderness, guarding, mass or organomegaly.   Extremities show no edema, normal peripheral pulses. R sided weakness--R foot/ hand mildly contracted  Neurological is normal, no focal findings.    BREAST EXAM: surgical scars noted on L breast-- rest deferred    PELVIC EXAM:   VULVA: vulvar erythema between labia minora/ majora-- consistent with yeast dermatitis, red, raised, maculopapular rash with satellite lesions consistent with yeast dermatitis in bilateral groin folds,   VAGINA: normal appearing vagina with normal color and discharge, no lesions, atrophic,  CERVIX: surgically absent,   UTERUS: absent,   ADNEXA: no masses,   RECTAL: normal rectal, no masses    ASSESSMENT:   1. Well woman exam     2. Yeast dermatitis  fluconazole (DIFLUCAN) 150 MG Tab    clotrimazole-betamethasone 1-0.05% (LOTRISONE) cream    fluconazole (DIFLUCAN) 150 MG Tab   3. Dyspnea on exertion  Ambulatory referral/consult to Cardiology       PLAN:     1. Well " woman  --needs colonoscopy  --talk to your pcp when you are ready    2. Increased dyspnea with exertion  --cardiology consult placed    3. Yeast dermatitis  --diflucan 150 mg every other day x 3 doses then once weekly x 3 weeks  --mycolog II cream to groin folds and inner labia x 3 weeks  --keep area clean and dry    4. RTC 1 year for follow up          30 minutes were spent in face to face time with this patient  90 % of this time was spent in counseling and/or coordination of care  Joselyn GOTTI [facility]  Division of Female Pelvic Medicine and Reconstructive Surgery  Department of Obstetrics & Gynecology

## 2020-10-13 NOTE — TELEPHONE ENCOUNTER
----- Message from Guillermina Kinney sent at 10/13/2020  1:59 PM CDT -----  Name of Who is Calling: Fiona)      What is the request in detail: Pt pharm is calling to speak to staff in regards to clarification on a script fluconazole (DIFLUCAN) 150 MG Tab.... Please call to further assist .       Can the clinic reply by [facility]: N      What [facility]: [phone]

## 2020-10-13 NOTE — PATIENT INSTRUCTIONS
1. Well woman  --needs colonoscopy  --talk to your pcp when you are ready    2. Increased dyspnea with exertion  --cardiology consult placed    3. Yeast dermatitis  --diflucan 150 mg every other day x 3 doses then once weekly x 3 weeks  --mycolog II cream to groin folds and inner labia x 3 weeks  --keep area clean and dry    4. RTC 1 year for follow up

## 2020-10-13 NOTE — TELEPHONE ENCOUNTER
Informed pt new rx for Diflucan 150 mg, Dis 6 tablets  was sent to [facility] pharmacy and pharmarcist confirmed receiving it. Pt voiced understanding and call ended.

## 2020-10-13 NOTE — TELEPHONE ENCOUNTER
Spoke with [facility] pharmacist and clarified rx Diflucan 150 mg,take 1 tablet every other day x 3 doses by mouth, then once weekly x 3 weeks, Dis 6 tablets. New rx being sent over.Nildaacist voiced understanding and call ended.

## 2020-10-16 PROCEDURE — 77295 3-D RADIOTHERAPY PLAN: CPT | Mod: TC | Performed by: RADIOLOGY

## 2020-10-16 PROCEDURE — 77300 RADIATION THERAPY DOSE PLAN: CPT | Mod: 26,,, | Performed by: RADIOLOGY

## 2020-10-16 PROCEDURE — 77300 RADIATION THERAPY DOSE PLAN: CPT | Mod: TC | Performed by: RADIOLOGY

## 2020-10-16 PROCEDURE — 77334 RADIATION TREATMENT AID(S): CPT | Mod: TC | Performed by: RADIOLOGY

## 2020-10-16 PROCEDURE — 77295 3-D RADIOTHERAPY PLAN: CPT | Mod: 26,,, | Performed by: RADIOLOGY

## 2020-10-16 PROCEDURE — 77295 PR 3D RADIOTHERAPY PLAN: ICD-10-PCS | Mod: 26,,, | Performed by: RADIOLOGY

## 2020-10-16 PROCEDURE — 77300 PR RADIATION THERAPY,DOSIMETRY PLAN: ICD-10-PCS | Mod: 26,,, | Performed by: RADIOLOGY

## 2020-10-16 PROCEDURE — 77334 RADIATION TREATMENT AID(S): CPT | Mod: 26,,, | Performed by: RADIOLOGY

## 2020-10-16 PROCEDURE — 77334 PR  RADN TREATMENT AID(S) COMPLX: ICD-10-PCS | Mod: 26,,, | Performed by: RADIOLOGY

## 2020-10-19 ENCOUNTER — OFFICE VISIT (OUTPATIENT)
Dept: CARDIOLOGY | Facility: CLINIC | Age: 70
End: 2020-10-19
Payer: MEDICARE

## 2020-10-19 VITALS
OXYGEN SATURATION: 97 % | BODY MASS INDEX: 37.29 KG/M2 | SYSTOLIC BLOOD PRESSURE: 156 MMHG | HEART RATE: 61 BPM | WEIGHT: 202.63 LBS | DIASTOLIC BLOOD PRESSURE: 75 MMHG | HEIGHT: 62 IN

## 2020-10-19 DIAGNOSIS — G04.90 ENCEPHALITIS: ICD-10-CM

## 2020-10-19 DIAGNOSIS — Z82.49 FAMILY HISTORY OF EARLY CAD: ICD-10-CM

## 2020-10-19 DIAGNOSIS — R06.09 DYSPNEA ON EXERTION: Primary | ICD-10-CM

## 2020-10-19 DIAGNOSIS — E66.09 CLASS 2 OBESITY DUE TO EXCESS CALORIES WITH BODY MASS INDEX (BMI) OF 37.0 TO 37.9 IN ADULT, UNSPECIFIED WHETHER SERIOUS COMORBIDITY PRESENT: ICD-10-CM

## 2020-10-19 DIAGNOSIS — R53.81 PHYSICAL DECONDITIONING: ICD-10-CM

## 2020-10-19 DIAGNOSIS — E78.5 HYPERLIPIDEMIA, UNSPECIFIED HYPERLIPIDEMIA TYPE: ICD-10-CM

## 2020-10-19 DIAGNOSIS — I10 ESSENTIAL HYPERTENSION: ICD-10-CM

## 2020-10-19 DIAGNOSIS — G81.01 FLACCID HEMIPLEGIA OF RIGHT DOMINANT SIDE DUE TO NONCEREBROVASCULAR ETIOLOGY: ICD-10-CM

## 2020-10-19 PROBLEM — G80.9 CEREBRAL PALSY: Status: RESOLVED | Noted: 2017-10-09 | Resolved: 2020-10-19

## 2020-10-19 PROCEDURE — 77412 RADIATION TX DELIVERY LVL 3: CPT | Performed by: RADIOLOGY

## 2020-10-19 PROCEDURE — 77417 THER RADIOLOGY PORT IMAGE(S): CPT | Performed by: RADIOLOGY

## 2020-10-19 PROCEDURE — 99204 PR OFFICE/OUTPT VISIT, NEW, LEVL IV, 45-59 MIN: ICD-10-PCS | Mod: S$PBB,,, | Performed by: INTERNAL MEDICINE

## 2020-10-19 PROCEDURE — G6002 PR STEREOSCOPIC XRAY GUIDE FOR RADIATION TX DELIV: ICD-10-PCS | Mod: 26,,, | Performed by: RADIOLOGY

## 2020-10-19 PROCEDURE — 99214 OFFICE O/P EST MOD 30 MIN: CPT | Mod: PBBFAC,25 | Performed by: INTERNAL MEDICINE

## 2020-10-19 PROCEDURE — 99999 PR PBB SHADOW E&M-EST. PATIENT-LVL IV: CPT | Mod: PBBFAC,,, | Performed by: INTERNAL MEDICINE

## 2020-10-19 PROCEDURE — G6002 STEREOSCOPIC X-RAY GUIDANCE: HCPCS | Mod: 26,,, | Performed by: RADIOLOGY

## 2020-10-19 PROCEDURE — 77387 GUIDANCE FOR RADJ TX DLVR: CPT | Mod: TC | Performed by: RADIOLOGY

## 2020-10-19 PROCEDURE — 99999 PR PBB SHADOW E&M-EST. PATIENT-LVL IV: ICD-10-PCS | Mod: PBBFAC,,, | Performed by: INTERNAL MEDICINE

## 2020-10-19 PROCEDURE — 99204 OFFICE O/P NEW MOD 45 MIN: CPT | Mod: S$PBB,,, | Performed by: INTERNAL MEDICINE

## 2020-10-19 NOTE — LETTER
October 19, 2020      Joselyn Chen, NP  [address]  [facility] [address]           [facility] [address] [address]  [address]  [facility] [address]  Phone: [phone]          Patient: Julissa Singletary   MR Number: [id number]   YOB: 1950   Date of Visit: 10/19/2020       Dear Joselyn Chen:    Thank you for referring Julissa Singletary to me for evaluation. Attached you will find relevant portions of my assessment and plan of care.    If you have questions, please do not hesitate to call me. I look forward to following Julissa Singletary along with you.    Sincerely,    Brain Momin MD    Enclosure  CC:  No Recipients    If you would like to receive this communication electronically, please contact [email or web address] or [phone] to request more information on [employer] access.    For providers and/or their staff who would like to refer a patient to [facility], please contact us through our one-stop-shop provider referral line, [facility], at [phone].    If you feel you have received this communication in error or would no longer like to receive these types of communications, please e-mail [email or web address]

## 2020-10-19 NOTE — PROGRESS NOTES
Subjective:    Patient ID:  Julissa Singletary is a 69 y.o. female who presents for evaluation of dyspnea on exertion    HPI   The patient is a 69 year old female referred by Ms April JOYCE for evaluation of VALENZUELA. 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 She has hypertension,hyperlipidemia and  post breast cancer. She has noted that granularly progressive VALENZUELA over 2 years. She denies chest pain and occasional palpitation. She is a non smoker,  father MI at 42 and mother  following CABG at 69.  EKG 20 revealed mild non specific T wave abnormalities.  Lab Results   Component Value Date     2020    K 3.8 2020     2020    CO2 28 2020    BUN 22 2020    CREATININE 0.8 2020    GLU 99 2020    MG 2.7 (H) 12/15/2004    AST 24 2020    ALT 21 2020    ALBUMIN 4.2 2020    PROT 7.7 2020    BILITOT 0.7 2020    WBC 7.14 2020    HGB 13.7 2020    HCT 41.9 2020    MCV 96 2020     2020    INR 1.0 12/15/2004    TSH 4.1 (H) 2004         Lab Results   Component Value Date    CHOL 170 10/18/2018    HDL 72 10/18/2018    TRIG 181 (H) 10/18/2018       Lab Results   Component Value Date    LDLCALC 62 10/18/2018       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        Current Outpatient Medications:     alendronate (FOSAMAX) 70 MG tablet, TAKE ONE TABLET BY MOUTH EVERY 7 DAYS, Disp: 12 tablet, Rfl: 3    atorvastatin (LIPITOR) 20 MG tablet, TAKE 1 TABLET BY MOUTH ONCE DAILY, Disp: 90 tablet, Rfl: 3    calcium carbonate (OS-BEBE) 600 mg calcium (1,500 mg) Tab, Take 600 mg by mouth once., Disp: , Rfl:     carvedilol (COREG) 6.25 MG tablet, TAKE 1 TABLET(6.25 MG) BY MOUTH TWICE DAILY WITH  MEALS, Disp: 180 tablet, Rfl: 3    clotrimazole-betamethasone 1-0.05% (LOTRISONE) cream, Apply to groin and labial folds twice daily x 3 weeks, Disp: 45 g, Rfl: 3    escitalopram oxalate (LEXAPRO) 20 MG tablet, Take 1 tablet (20 mg total) by mouth once daily., Disp: 90 tablet, Rfl: 3    fluconazole (DIFLUCAN) 150 MG Tab, Take one tablet every other day x 3 doses, then once weekly x 3 weeks, Disp: 6 tablet, Rfl: 0    LORazepam (ATIVAN) 1 MG tablet, TAKE 1 TABLET BY MOUTH EVERY NIGHT AT BEDTIME AS NEEDED FOR INSOMNIA, Disp: 30 tablet, Rfl: 1    melatonin 5 mg Tab, Take 1 tablet by mouth nightly as needed., Disp: , Rfl:     TURMERIC ORAL, Take 1 tablet by mouth once daily., Disp: , Rfl:     valsartan-hydrochlorothiazide (DIOVAN-HCT) 320-25 mg per tablet, TAKE 1 TABLET BY MOUTH EVERY DAY, Disp: 90 tablet, Rfl: 1    HYDROcodone-acetaminophen (NORCO) 5-325 mg per tablet, Take 1 tablet by mouth every 6 (six) hours as needed for Pain (Do not drive after taking this medication.). (Patient not taking: Reported on 10/19/2020), Disp: 21 tablet, Rfl: 0    HYDROcodone-acetaminophen (NORCO) 5-325 mg per tablet, Take 1 tablet by mouth every 6 (six) hours as needed for Pain. (Patient not taking: Reported on 10/19/2020), Disp: 21 tablet, Rfl: 0    oxyCODONE-acetaminophen (PERCOCET) 5-325 mg per tablet, Take 1 tablet by mouth every 4 (four) hours as needed for Pain. (Patient not taking: Reported on 10/19/2020), Disp: 20 tablet, Rfl: 0  No current facility-administered medications for this visit.     Facility-Administered Medications Ordered in Other Visits:     0.9%  NaCl infusion, , Intravenous, Continuous, Shara Wright MD, Stopped at 09/04/20 1109    0.9%  NaCl infusion, , Intravenous, Continuous, Michel Story MD, Last Rate: 70 mL/hr at 09/04/20 0832    ceFAZolin injection 2 g, 2 g, Intravenous, On Call Procedure, Michel Story MD    fentaNYL injection 25 mcg, 25 mcg, Intravenous, Q5 Min  "PRN, Jorge L Ibanez MD, 50 mcg at 09/04/20 0842    midazolam (VERSED) 1 mg/mL injection 0.5 mg, 0.5 mg, Intravenous, PRN, Jorge L Ibanez MD, 1 mg at 09/04/20 0842          Review of Systems   Constitution: Negative for decreased appetite, diaphoresis, fever, malaise/fatigue, weight gain and weight loss.   HENT: Negative for congestion, ear discharge, ear pain and nosebleeds.    Eyes: Negative for blurred vision, double vision and visual disturbance.   Cardiovascular: Positive for dyspnea on exertion. Negative for chest pain, claudication, cyanosis, irregular heartbeat, leg swelling, near-syncope, orthopnea, palpitations, paroxysmal nocturnal dyspnea and syncope.   Respiratory: Negative for cough, hemoptysis, shortness of breath, sleep disturbances due to breathing, snoring, sputum production and wheezing.    Endocrine: Negative for polydipsia, polyphagia and polyuria.   Hematologic/Lymphatic: Negative for adenopathy and bleeding problem. Does not bruise/bleed easily.   Skin: Negative for color change, nail changes, poor wound healing and rash.   Musculoskeletal: Negative for muscle cramps and muscle weakness.   Gastrointestinal: Negative for abdominal pain, anorexia, change in bowel habit, hematochezia, nausea and vomiting.   Genitourinary: Negative for dysuria, frequency and hematuria.   Neurological: Positive for focal weakness. Negative for brief paralysis, difficulty with concentration, excessive daytime sleepiness, dizziness, headaches, light-headedness, seizures, vertigo and weakness (right leg).   Psychiatric/Behavioral: Negative for altered mental status and depression.   Allergic/Immunologic: Negative for persistent infections.        Objective:BP (!) 156/75 (BP Location: Left arm, Patient Position: Sitting, BP Method: X-Large (Automatic))   Pulse 61   Ht 5' 2" (1.575 m)   Wt 91.9 kg (202 lb 9.6 oz)   LMP  (LMP Unknown)   SpO2 97%   BMI 37.06 kg/m²             Physical Exam "   Constitutional: She is oriented to person, place, and time. She appears well-developed and well-nourished.   obese   HENT:   Head: Normocephalic.   Right Ear: External ear normal.   Left Ear: External ear normal.   Nose: Nose normal.   Inspection of lips, teeth and gums normal   Eyes: Pupils are equal, round, and reactive to light. Conjunctivae and EOM are normal. No scleral icterus.   Neck: Normal range of motion. No JVD present. No tracheal deviation present. No thyromegaly present.   Cardiovascular: Normal rate, regular rhythm, normal heart sounds and intact distal pulses. Exam reveals no gallop and no friction rub.   No murmur heard.  Pulses:       Dorsalis pedis pulses are 0 on the right side and 0 on the left side.        Posterior tibial pulses are 0 on the right side and 0 on the left side.   Pulmonary/Chest: Effort normal and breath sounds normal. No respiratory distress. She has no wheezes. She has no rales. She exhibits no tenderness.   Abdominal: Soft. Bowel sounds are normal. She exhibits no distension. There is no hepatosplenomegaly. There is no abdominal tenderness. There is no guarding.   Musculoskeletal: Normal range of motion.         General: No tenderness or edema.   Lymphadenopathy:   Palpation of lymph nodes of neck and groin normal   Neurological: She is oriented to person, place, and time. No cranial nerve deficit. She exhibits normal muscle tone. Coordination normal.   Skin: Skin is warm and dry. No rash noted. No erythema. No pallor.   Palpation of skin normal   Psychiatric: She has a normal mood and affect. Her behavior is normal. Judgment and thought content normal.         Assessment:       1. Dyspnea on exertion    2. Encephalitis    3. Essential hypertension    4. Hyperlipidemia, unspecified hyperlipidemia type    5. 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 Class 2 obesity due to excess calories with body mass index (BMI) of 37.0 to 37.9 in  adult, unspecified whether serious comorbidity present    7. Family history of early CAD    8. Physical deconditioning         Plan:       Julissa was seen today for dyspnea on exertion.    Diagnoses and all orders for this visit:    Dyspnea on exertion  -     Ambulatory referral/consult to Cardiology  -     Echo Color Flow Doppler? Yes; Future    Encephalitis    Essential hypertension    Hyperlipidemia, unspecified hyperlipidemia type    Flaccid hemiplegia of right dominant side due to noncerebrovascular etiology    Class 2 obesity due to excess calories with body mass index (BMI) of 37.0 to 37.9 in adult, unspecified whether serious comorbidity present    Family history of early CAD    Physical deconditioning

## 2020-10-20 PROCEDURE — 77387 GUIDANCE FOR RADJ TX DLVR: CPT | Mod: TC | Performed by: RADIOLOGY

## 2020-10-20 PROCEDURE — 77412 RADIATION TX DELIVERY LVL 3: CPT | Performed by: RADIOLOGY

## 2020-10-20 PROCEDURE — G6002 STEREOSCOPIC X-RAY GUIDANCE: HCPCS | Mod: 26,,, | Performed by: RADIOLOGY

## 2020-10-20 PROCEDURE — G6002 PR STEREOSCOPIC XRAY GUIDE FOR RADIATION TX DELIV: ICD-10-PCS | Mod: 26,,, | Performed by: RADIOLOGY

## 2020-10-21 ENCOUNTER — HOSPITAL ENCOUNTER (OUTPATIENT)
Dept: CARDIOLOGY | Facility: HOSPITAL | Age: 70
Discharge: HOME | End: 2020-10-21
Attending: INTERNAL MEDICINE
Payer: MEDICARE

## 2020-10-21 VITALS
BODY MASS INDEX: 37.17 KG/M2 | HEART RATE: 55 BPM | HEIGHT: 62 IN | SYSTOLIC BLOOD PRESSURE: 128 MMHG | WEIGHT: 202 LBS | DIASTOLIC BLOOD PRESSURE: 70 MMHG

## 2020-10-21 DIAGNOSIS — R06.09 DYSPNEA ON EXERTION: ICD-10-CM

## 2020-10-21 LAB
ASCENDING AORTA: 3.28 CM
AV INDEX (PROSTH): 0.84
AV MEAN GRADIENT: 3 MMHG
AV PEAK GRADIENT: 6 MMHG
AV VALVE AREA: 2.96 CM2
AV VELOCITY RATIO: 0.79
BSA FOR ECHO PROCEDURE: 2 M2
CV ECHO LV RWT: 0.33 CM
DOP CALC AO PEAK VEL: 1.19 M/S
DOP CALC AO VTI: 26.13 CM
DOP CALC LVOT AREA: 3.5 CM2
DOP CALC LVOT DIAMETER: 2.12 CM
DOP CALC LVOT PEAK VEL: 0.94 M/S
DOP CALC LVOT STROKE VOLUME: 77.27 CM3
DOP CALCLVOT PEAK VEL VTI: 21.9 CM
E WAVE DECELERATION TIME: 199.25 MSEC
E/A RATIO: 0.71
E/E' RATIO: 8.38 M/S
ECHO LV POSTERIOR WALL: 0.75 CM (ref 0.6–1.1)
FRACTIONAL SHORTENING: 35 % (ref 28–44)
INTERVENTRICULAR SEPTUM: 0.76 CM (ref 0.6–1.1)
LA MAJOR: 4.7 CM
LA MINOR: 4.74 CM
LA WIDTH: 3.5 CM
LEFT ATRIUM SIZE: 3.5 CM
LEFT ATRIUM VOLUME INDEX: 25.6 ML/M2
LEFT ATRIUM VOLUME: 49.15 CM3
LEFT INTERNAL DIMENSION IN SYSTOLE: 2.94 CM (ref 2.1–4)
LEFT VENTRICLE DIASTOLIC VOLUME INDEX: 48.89 ML/M2
LEFT VENTRICLE DIASTOLIC VOLUME: 93.85 ML
LEFT VENTRICLE MASS INDEX: 56 G/M2
LEFT VENTRICLE SYSTOLIC VOLUME INDEX: 17.4 ML/M2
LEFT VENTRICLE SYSTOLIC VOLUME: 33.4 ML
LEFT VENTRICULAR INTERNAL DIMENSION IN DIASTOLE: 4.53 CM (ref 3.5–6)
LEFT VENTRICULAR MASS: 106.59 G
LV LATERAL E/E' RATIO: 6.7 M/S
LV SEPTAL E/E' RATIO: 11.17 M/S
MV PEAK A VEL: 0.95 M/S
MV PEAK E VEL: 0.67 M/S
MV STENOSIS PRESSURE HALF TIME: 57.78 MS
MV VALVE AREA P 1/2 METHOD: 3.81 CM2
PULM VEIN S/D RATIO: 1.47
PV PEAK D VEL: 0.32 M/S
PV PEAK S VEL: 0.47 M/S
RA MAJOR: 4.01 CM
RA PRESSURE: 3 MMHG
RA WIDTH: 2.75 CM
RIGHT VENTRICULAR END-DIASTOLIC DIMENSION: 2.93 CM
RV TISSUE DOPPLER FREE WALL SYSTOLIC VELOCITY 1 (APICAL 4 CHAMBER VIEW): 10.94 CM/S
SINUS: 2.89 CM
STJ: 2.85 CM
TDI LATERAL: 0.1 M/S
TDI SEPTAL: 0.06 M/S
TDI: 0.08 M/S
TRICUSPID ANNULAR PLANE SYSTOLIC EXCURSION: 1.98 CM

## 2020-10-21 PROCEDURE — G6002 STEREOSCOPIC X-RAY GUIDANCE: HCPCS | Mod: 26,,, | Performed by: RADIOLOGY

## 2020-10-21 PROCEDURE — 93306 TTE W/DOPPLER COMPLETE: CPT

## 2020-10-21 PROCEDURE — 93306 TTE W/DOPPLER COMPLETE: CPT | Mod: 26,,, | Performed by: INTERNAL MEDICINE

## 2020-10-21 PROCEDURE — 77412 RADIATION TX DELIVERY LVL 3: CPT | Performed by: RADIOLOGY

## 2020-10-21 PROCEDURE — G6002 PR STEREOSCOPIC XRAY GUIDE FOR RADIATION TX DELIV: ICD-10-PCS | Mod: 26,,, | Performed by: RADIOLOGY

## 2020-10-21 PROCEDURE — 93306 ECHO (CUPID ONLY): ICD-10-PCS | Mod: 26,,, | Performed by: INTERNAL MEDICINE

## 2020-10-21 PROCEDURE — 77387 GUIDANCE FOR RADJ TX DLVR: CPT | Mod: TC | Performed by: RADIOLOGY

## 2020-10-22 ENCOUNTER — DOCUMENTATION ONLY (OUTPATIENT)
Dept: RADIATION ONCOLOGY | Facility: CLINIC | Age: 70
End: 2020-10-22

## 2020-10-22 ENCOUNTER — TELEPHONE (OUTPATIENT)
Dept: CARDIOLOGY | Facility: CLINIC | Age: 70
End: 2020-10-22

## 2020-10-22 PROCEDURE — 77412 RADIATION TX DELIVERY LVL 3: CPT | Performed by: RADIOLOGY

## 2020-10-22 PROCEDURE — G6002 STEREOSCOPIC X-RAY GUIDANCE: HCPCS | Mod: 26,,, | Performed by: RADIOLOGY

## 2020-10-22 PROCEDURE — 77387 GUIDANCE FOR RADJ TX DLVR: CPT | Mod: TC | Performed by: RADIOLOGY

## 2020-10-22 PROCEDURE — G6002 PR STEREOSCOPIC XRAY GUIDE FOR RADIATION TX DELIV: ICD-10-PCS | Mod: 26,,, | Performed by: RADIOLOGY

## 2020-10-23 PROCEDURE — 77412 RADIATION TX DELIVERY LVL 3: CPT | Performed by: RADIOLOGY

## 2020-10-23 PROCEDURE — G6002 STEREOSCOPIC X-RAY GUIDANCE: HCPCS | Mod: 26,,, | Performed by: RADIOLOGY

## 2020-10-23 PROCEDURE — G6002 PR STEREOSCOPIC XRAY GUIDE FOR RADIATION TX DELIV: ICD-10-PCS | Mod: 26,,, | Performed by: RADIOLOGY

## 2020-10-23 PROCEDURE — 77387 GUIDANCE FOR RADJ TX DLVR: CPT | Mod: TC | Performed by: RADIOLOGY

## 2020-10-23 NOTE — PROGRESS NOTES
Subjective:       Patient ID: Julissa Singletary is a 69 y.o. female.    Chief Complaint: No chief complaint on file.    HPI Mrs Casanova returns today for follow-up.  She is currently receiving radiation therapy which she is expected to complete by mid November.   Of note, her Oncotype score was 2, suggestive of a 97% disease-free survival at 9 years with AIs.    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  On 09/04/2020 she underwent a lumpectomy and sentinel lymph node biopsy.  She had a grade 1 carcinoma that measured 12 mm in greatest diameter.  Two sentinel lymph nodes were negative.  Resection margins were clear.  Her Oncotype score was 2.      Review of Systems    Overall she feels well. She has fully recovered from the lumpectomy and she is currently receiving radiation therapy.  She denies any anxiety, depression, easy bruising, fevers, chills, weight loss, nausea, vomiting, diarrhea, constipation, diplopia,  blurred vision, headache, chest pain, palpitations, shortness of breath, breast pain, or abdominal pain, but she does have difficulty ambulating and uses a cane  The remainder of the ten-point ROS, including general, skin, lymph, H/N, cardiorespiratory, GI, , Neuro, Endocrine, and psychiatric is negative.     Objective:      Physical Exam      She is alert, oriented to time, place, person, pleasant, well      nourished, in no acute physical distress.                                    VITAL SIGNS:  Reviewed                                      HEENT:  Normal.  There are no nasal, oral, lip, gingival, auricular, lid,    or conjunctival lesions.  Mucosae are moist and pink, and there is no        thrush.  Pupils are equal, reactive to light and accommodation.              Extraocular muscle movements are intact.  Dentition is good.  There is no  frontal or maxillary tenderness.                                     NECK:  Supple without JVD, adenopathy, or thyromegaly.                       LUNGS:  Clear to auscultation without wheezing, rales, or rhonchi.           CARDIOVASCULAR:  Reveals an S1, S2, no murmurs, no rubs, no gallops.         ABDOMEN:  Soft, nontender, without organomegaly.  Bowel sounds are    present.                                                                     EXTREMITIES:  No cyanosis, clubbing, or edema.   she has a club right foot and her right upper extremity is somewhat contracted.  She uses a cane for ambulation                              BREASTS:  She is status post right lumpectomy with a well-healed incision at the 9 o'clock position.  There is a right axillary sentinel lymph node biopsy incision which is also well healed.     There are no masses in either breast.    There is erythema within the radiation field.                               LYMPHATIC:  There is no cervical, axillary, or supraclavicular adenopathy.   SKIN:  Warm and moist, without petechiae, rashes, induration, or ecchymoses.           NEUROLOGIC:  DTRs are 0-1+ bilaterally, symmetrical, motor function is 5/5,  and cranial nerves are  within normal limits.      Assessment:       1. Invasive ductal carcinoma of breast, female, right    2. 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       3,    1st degree burn secondary to radiation  Plan:         I had a long discussion with her.  She had a 1.2 cm node-negative grade 1 carcinoma and she is status post lumpectomy.  Her Oncotype score was 2 suggestive of an 97% chance of DFS at 9 years with Arimidex.  Therefore, there is no need for chemotherapy.  The pros and cons of adjuvant hormonal therapy with anastrazole were discussed in detail; she was given a prescription for anastrazole 1 mg tablets with instructions to start upon completion of radiation therapy, take one per day,  and she will return to see me in late December with a DXA scan.  Her questions were answered to her satisfaction.

## 2020-10-24 PROCEDURE — 77336 RADIATION PHYSICS CONSULT: CPT | Performed by: RADIOLOGY

## 2020-10-26 ENCOUNTER — OFFICE VISIT (OUTPATIENT)
Dept: HEMATOLOGY/ONCOLOGY | Facility: CLINIC | Age: 70
End: 2020-10-26
Payer: MEDICARE

## 2020-10-26 VITALS
BODY MASS INDEX: 37.16 KG/M2 | WEIGHT: 201.94 LBS | TEMPERATURE: 98 F | DIASTOLIC BLOOD PRESSURE: 71 MMHG | HEIGHT: 62 IN | SYSTOLIC BLOOD PRESSURE: 134 MMHG | HEART RATE: 65 BPM | OXYGEN SATURATION: 97 % | RESPIRATION RATE: 16 BRPM

## 2020-10-26 DIAGNOSIS — C50.211 MALIGNANT NEOPLASM OF UPPER-INNER QUADRANT OF RIGHT BREAST IN FEMALE, ESTROGEN RECEPTOR POSITIVE: Primary | ICD-10-CM

## 2020-10-26 DIAGNOSIS — Z17.0 MALIGNANT NEOPLASM OF UPPER-INNER QUADRANT OF RIGHT BREAST IN FEMALE, ESTROGEN RECEPTOR POSITIVE: Primary | ICD-10-CM

## 2020-10-26 DIAGNOSIS — T38.6X5A OSTEOPOROSIS DUE TO AROMATASE INHIBITOR: ICD-10-CM

## 2020-10-26 DIAGNOSIS — M81.8 OSTEOPOROSIS DUE TO AROMATASE INHIBITOR: ICD-10-CM

## 2020-10-26 PROCEDURE — 99214 OFFICE O/P EST MOD 30 MIN: CPT | Mod: PBBFAC,25 | Performed by: INTERNAL MEDICINE

## 2020-10-26 PROCEDURE — G6002 STEREOSCOPIC X-RAY GUIDANCE: HCPCS | Mod: 26,,, | Performed by: RADIOLOGY

## 2020-10-26 PROCEDURE — 99999 PR PBB SHADOW E&M-EST. PATIENT-LVL IV: CPT | Mod: PBBFAC,,, | Performed by: INTERNAL MEDICINE

## 2020-10-26 PROCEDURE — 77417 THER RADIOLOGY PORT IMAGE(S): CPT | Performed by: RADIOLOGY

## 2020-10-26 PROCEDURE — G6002 PR STEREOSCOPIC XRAY GUIDE FOR RADIATION TX DELIV: ICD-10-PCS | Mod: 26,,, | Performed by: RADIOLOGY

## 2020-10-26 PROCEDURE — 99214 OFFICE O/P EST MOD 30 MIN: CPT | Mod: S$PBB,,, | Performed by: INTERNAL MEDICINE

## 2020-10-26 PROCEDURE — 77412 RADIATION TX DELIVERY LVL 3: CPT | Performed by: RADIOLOGY

## 2020-10-26 PROCEDURE — 77387 GUIDANCE FOR RADJ TX DLVR: CPT | Mod: TC | Performed by: RADIOLOGY

## 2020-10-26 PROCEDURE — 99214 PR OFFICE/OUTPT VISIT, EST, LEVL IV, 30-39 MIN: ICD-10-PCS | Mod: S$PBB,,, | Performed by: INTERNAL MEDICINE

## 2020-10-26 PROCEDURE — 99999 PR PBB SHADOW E&M-EST. PATIENT-LVL IV: ICD-10-PCS | Mod: PBBFAC,,, | Performed by: INTERNAL MEDICINE

## 2020-10-26 RX ORDER — ANASTROZOLE 1 MG/1
1 TABLET ORAL DAILY
Qty: 90 TABLET | Refills: 3 | Status: SHIPPED | OUTPATIENT
Start: 2020-10-26 | End: 2020-11-12 | Stop reason: SDUPTHER

## 2020-10-27 PROCEDURE — G6002 STEREOSCOPIC X-RAY GUIDANCE: HCPCS | Mod: 26,,, | Performed by: RADIOLOGY

## 2020-10-27 PROCEDURE — 77412 RADIATION TX DELIVERY LVL 3: CPT | Performed by: RADIOLOGY

## 2020-10-27 PROCEDURE — G6002 PR STEREOSCOPIC XRAY GUIDE FOR RADIATION TX DELIV: ICD-10-PCS | Mod: 26,,, | Performed by: RADIOLOGY

## 2020-10-27 PROCEDURE — 77387 GUIDANCE FOR RADJ TX DLVR: CPT | Mod: TC | Performed by: RADIOLOGY

## 2020-10-28 PROCEDURE — G6002 PR STEREOSCOPIC XRAY GUIDE FOR RADIATION TX DELIV: ICD-10-PCS | Mod: 26,,, | Performed by: RADIOLOGY

## 2020-10-28 PROCEDURE — 77387 GUIDANCE FOR RADJ TX DLVR: CPT | Mod: TC | Performed by: RADIOLOGY

## 2020-10-28 PROCEDURE — G6002 STEREOSCOPIC X-RAY GUIDANCE: HCPCS | Mod: 26,,, | Performed by: RADIOLOGY

## 2020-10-28 PROCEDURE — 77412 RADIATION TX DELIVERY LVL 3: CPT | Performed by: RADIOLOGY

## 2020-10-29 ENCOUNTER — DOCUMENTATION ONLY (OUTPATIENT)
Dept: RADIATION ONCOLOGY | Facility: CLINIC | Age: 70
End: 2020-10-29

## 2020-10-29 PROCEDURE — 77412 RADIATION TX DELIVERY LVL 3: CPT | Performed by: RADIOLOGY

## 2020-10-29 PROCEDURE — G6002 STEREOSCOPIC X-RAY GUIDANCE: HCPCS | Mod: 26,,, | Performed by: RADIOLOGY

## 2020-10-29 PROCEDURE — 77387 GUIDANCE FOR RADJ TX DLVR: CPT | Mod: TC | Performed by: RADIOLOGY

## 2020-10-29 PROCEDURE — G6002 PR STEREOSCOPIC XRAY GUIDE FOR RADIATION TX DELIV: ICD-10-PCS | Mod: 26,,, | Performed by: RADIOLOGY

## 2020-10-30 PROCEDURE — 77412 RADIATION TX DELIVERY LVL 3: CPT | Performed by: RADIOLOGY

## 2020-10-30 PROCEDURE — 77387 GUIDANCE FOR RADJ TX DLVR: CPT | Mod: TC | Performed by: RADIOLOGY

## 2020-10-30 PROCEDURE — G6002 STEREOSCOPIC X-RAY GUIDANCE: HCPCS | Mod: 26,,, | Performed by: RADIOLOGY

## 2020-10-30 PROCEDURE — G6002 PR STEREOSCOPIC XRAY GUIDE FOR RADIATION TX DELIV: ICD-10-PCS | Mod: 26,,, | Performed by: RADIOLOGY

## 2020-11-02 ENCOUNTER — HOSPITAL ENCOUNTER (OUTPATIENT)
Dept: RADIATION THERAPY | Facility: HOSPITAL | Age: 70
Discharge: HOME | End: 2020-11-02
Attending: RADIOLOGY
Payer: MEDICARE

## 2020-11-02 PROCEDURE — 77412 RADIATION TX DELIVERY LVL 3: CPT | Performed by: RADIOLOGY

## 2020-11-02 PROCEDURE — 77387 GUIDANCE FOR RADJ TX DLVR: CPT | Mod: TC | Performed by: RADIOLOGY

## 2020-11-02 PROCEDURE — G6002 PR STEREOSCOPIC XRAY GUIDE FOR RADIATION TX DELIV: ICD-10-PCS | Mod: 26,,, | Performed by: RADIOLOGY

## 2020-11-02 PROCEDURE — 77417 THER RADIOLOGY PORT IMAGE(S): CPT | Performed by: RADIOLOGY

## 2020-11-02 PROCEDURE — 77336 RADIATION PHYSICS CONSULT: CPT | Performed by: RADIOLOGY

## 2020-11-02 PROCEDURE — G6002 STEREOSCOPIC X-RAY GUIDANCE: HCPCS | Mod: 26,,, | Performed by: RADIOLOGY

## 2020-11-03 PROCEDURE — G6002 STEREOSCOPIC X-RAY GUIDANCE: HCPCS | Mod: 26,,, | Performed by: RADIOLOGY

## 2020-11-03 PROCEDURE — 77387 GUIDANCE FOR RADJ TX DLVR: CPT | Mod: TC | Performed by: RADIOLOGY

## 2020-11-03 PROCEDURE — G6002 PR STEREOSCOPIC XRAY GUIDE FOR RADIATION TX DELIV: ICD-10-PCS | Mod: 26,,, | Performed by: RADIOLOGY

## 2020-11-03 PROCEDURE — 77412 RADIATION TX DELIVERY LVL 3: CPT | Performed by: RADIOLOGY

## 2020-11-04 PROCEDURE — 77412 RADIATION TX DELIVERY LVL 3: CPT | Performed by: RADIOLOGY

## 2020-11-04 PROCEDURE — G6002 PR STEREOSCOPIC XRAY GUIDE FOR RADIATION TX DELIV: ICD-10-PCS | Mod: 26,,, | Performed by: RADIOLOGY

## 2020-11-04 PROCEDURE — G6002 STEREOSCOPIC X-RAY GUIDANCE: HCPCS | Mod: 26,,, | Performed by: RADIOLOGY

## 2020-11-04 PROCEDURE — 77387 GUIDANCE FOR RADJ TX DLVR: CPT | Mod: TC | Performed by: RADIOLOGY

## 2020-11-05 ENCOUNTER — DOCUMENTATION ONLY (OUTPATIENT)
Dept: RADIATION ONCOLOGY | Facility: CLINIC | Age: 70
End: 2020-11-05

## 2020-11-05 PROCEDURE — 77387 GUIDANCE FOR RADJ TX DLVR: CPT | Mod: TC | Performed by: RADIOLOGY

## 2020-11-05 PROCEDURE — G6002 PR STEREOSCOPIC XRAY GUIDE FOR RADIATION TX DELIV: ICD-10-PCS | Mod: 26,,, | Performed by: RADIOLOGY

## 2020-11-05 PROCEDURE — G6002 STEREOSCOPIC X-RAY GUIDANCE: HCPCS | Mod: 26,,, | Performed by: RADIOLOGY

## 2020-11-05 PROCEDURE — 77412 RADIATION TX DELIVERY LVL 3: CPT | Performed by: RADIOLOGY

## 2020-11-06 PROCEDURE — 77412 RADIATION TX DELIVERY LVL 3: CPT | Performed by: RADIOLOGY

## 2020-11-06 PROCEDURE — 77387 GUIDANCE FOR RADJ TX DLVR: CPT | Mod: TC | Performed by: RADIOLOGY

## 2020-11-06 PROCEDURE — G6002 STEREOSCOPIC X-RAY GUIDANCE: HCPCS | Mod: 26,,, | Performed by: RADIOLOGY

## 2020-11-06 PROCEDURE — G6002 PR STEREOSCOPIC XRAY GUIDE FOR RADIATION TX DELIV: ICD-10-PCS | Mod: 26,,, | Performed by: RADIOLOGY

## 2020-11-09 ENCOUNTER — DOCUMENTATION ONLY (OUTPATIENT)
Dept: RADIATION ONCOLOGY | Facility: CLINIC | Age: 70
End: 2020-11-09

## 2020-11-09 PROCEDURE — G6002 STEREOSCOPIC X-RAY GUIDANCE: HCPCS | Mod: 26,,, | Performed by: RADIOLOGY

## 2020-11-09 PROCEDURE — 77412 RADIATION TX DELIVERY LVL 3: CPT | Performed by: RADIOLOGY

## 2020-11-09 PROCEDURE — G6002 PR STEREOSCOPIC XRAY GUIDE FOR RADIATION TX DELIV: ICD-10-PCS | Mod: 26,,, | Performed by: RADIOLOGY

## 2020-11-09 PROCEDURE — 77387 GUIDANCE FOR RADJ TX DLVR: CPT | Mod: TC | Performed by: RADIOLOGY

## 2020-11-09 PROCEDURE — 77336 RADIATION PHYSICS CONSULT: CPT | Performed by: RADIOLOGY

## 2020-11-12 DIAGNOSIS — C50.211 MALIGNANT NEOPLASM OF UPPER-INNER QUADRANT OF RIGHT BREAST IN FEMALE, ESTROGEN RECEPTOR POSITIVE: ICD-10-CM

## 2020-11-12 DIAGNOSIS — Z17.0 MALIGNANT NEOPLASM OF UPPER-INNER QUADRANT OF RIGHT BREAST IN FEMALE, ESTROGEN RECEPTOR POSITIVE: ICD-10-CM

## 2020-11-12 RX ORDER — ANASTROZOLE 1 MG/1
1 TABLET ORAL DAILY
Qty: 90 TABLET | Refills: 3 | Status: SHIPPED | OUTPATIENT
Start: 2020-11-12 | End: 2021-02-17 | Stop reason: SDUPTHER

## 2020-11-12 NOTE — TELEPHONE ENCOUNTER
----- Message from Roula Ryan sent at 11/12/2020 10:59 AM CST -----  Pt stated she lost her medication and she needs another fill    anastrozole (ARIMIDEX) 1 mg Tab        [id number] - [place], [place] - [address] AT [facility] [address]      [phone]     [facility] NIGHT C[employer]AGE    Upon chart review, cardiac enzymes as follows:    Troponin T, High Sensitivity: 67: Delta: 71 on 07/15/    CARDIAC MARKERS ( 2020 00:57 )  x     / x     / 7412 u/L / 24.14 ng/mL / x        Urinalysis Basic - ( 15 Jul 2020 13:25 )    Color: LIGHT YELLOW / Appearance: CLEAR / S.016 / pH: 6.5  Gluc: NEGATIVE / Ketone: NEGATIVE  / Bili: NEGATIVE / Urobili: NORMAL   Blood: MODERATE / Protein: 50 / Nitrite: NEGATIVE   Leuk Esterase: NEGATIVE / RBC: 0-2 / WBC 0-2   Sq Epi: OCC / Non Sq Epi: x / Bacteria: NEGATIVE    Patient currently sleeping in bed comfortably, without complaints. Will obtain STAT EKG to r/o NSTEMI. Will trend CE, and start on LR @ 125cc/hr (EF 2018 62%) for possible rhabdomyolysis given history of fall.    Nahed Griggs PA-C  Medicine [id number] [facility] MEDICINE NIGHT COVERAGE    Upon chart review, cardiac enzymes as follows:    Troponin T, High Sensitivity: 67: Delta: 71 on 07/15/    CARDIAC MARKERS ( 2020 00:57 )  x     / x     / 7412 u/L / 24.14 ng/mL / x        Urinalysis Basic - ( 15 Jul 2020 13:25 )    Color: LIGHT YELLOW / Appearance: CLEAR / S.016 / pH: 6.5  Gluc: NEGATIVE / Ketone: NEGATIVE  / Bili: NEGATIVE / Urobili: NORMAL   Blood: MODERATE / Protein: 50 / Nitrite: NEGATIVE   Leuk Esterase: NEGATIVE / RBC: 0-2 / WBC 0-2   Sq Epi: OCC / Non Sq Epi: x / Bacteria: NEGATIVE    Patient currently sleeping in bed comfortably, without complaints. Will obtain STAT EKG to r/o NSTEMI. Will trend CE, and start on LR @ 125cc/hr (EF 2018 62%) for possible rhabdomyolysis given history of fall.    Addendum 2020 @ 03:44 -- EKG reviewed. NSR @ 64bpm; no acute changes from previous. Patient remains asymptomatic. Will continue to monitor.     Nahed Griggs PA-C  Medicine [id number] No

## 2020-12-03 RX ORDER — VALSARTAN AND HYDROCHLOROTHIAZIDE 320; 25 MG/1; MG/1
1 TABLET, FILM COATED ORAL DAILY
Qty: 90 TABLET | Refills: 1 | Status: SHIPPED | OUTPATIENT
Start: 2020-12-03 | End: 2021-06-24

## 2020-12-03 NOTE — TELEPHONE ENCOUNTER
----- Message from Sheela Anderson sent at 12/3/2020  9:57 AM CST -----  Regarding: Pt self Mobile/Home [phone]  Requesting an RX refill or new RX.  Is this a refill or new RX: Refill  RX name and strength:Zalsaratan 320MG  Is this a 30 day or 90 day RX: 90  Pharmacy name and phone # [id number] - [place], [place] - [address]  [phone] Fax# [phone]

## 2020-12-04 ENCOUNTER — TELEPHONE (OUTPATIENT)
Dept: RADIATION ONCOLOGY | Facility: CLINIC | Age: 70
End: 2020-12-04

## 2020-12-04 NOTE — TELEPHONE ENCOUNTER
Phone review s/p xrt; no answer, left voicemail requesting a call back. Contact information provided. Follow-up appointment scheduled and placed in the mail.

## 2020-12-08 ENCOUNTER — OFFICE VISIT (OUTPATIENT)
Dept: PRIMARY CARE CLINIC | Facility: CLINIC | Age: 70
End: 2020-12-08
Payer: MEDICARE

## 2020-12-08 VITALS
OXYGEN SATURATION: 97 % | DIASTOLIC BLOOD PRESSURE: 78 MMHG | SYSTOLIC BLOOD PRESSURE: 132 MMHG | TEMPERATURE: 97 F | BODY MASS INDEX: 37.45 KG/M2 | HEART RATE: 82 BPM | HEIGHT: 62 IN | WEIGHT: 203.5 LBS

## 2020-12-08 DIAGNOSIS — J20.9 ACUTE BRONCHITIS, UNSPECIFIED ORGANISM: ICD-10-CM

## 2020-12-08 DIAGNOSIS — J40 BRONCHITIS: Primary | ICD-10-CM

## 2020-12-08 PROCEDURE — 99214 PR OFFICE/OUTPT VISIT, EST, LEVL IV, 30-39 MIN: ICD-10-PCS | Mod: S$PBB,,, | Performed by: STUDENT IN AN ORGANIZED HEALTH CARE EDUCATION/TRAINING PROGRAM

## 2020-12-08 PROCEDURE — 99214 OFFICE O/P EST MOD 30 MIN: CPT | Mod: S$PBB,,, | Performed by: STUDENT IN AN ORGANIZED HEALTH CARE EDUCATION/TRAINING PROGRAM

## 2020-12-08 PROCEDURE — 99999 PR PBB SHADOW E&M-EST. PATIENT-LVL IV: ICD-10-PCS | Mod: PBBFAC,,, | Performed by: STUDENT IN AN ORGANIZED HEALTH CARE EDUCATION/TRAINING PROGRAM

## 2020-12-08 PROCEDURE — 96372 THER/PROPH/DIAG INJ SC/IM: CPT | Mod: PBBFAC,PN

## 2020-12-08 PROCEDURE — 99214 OFFICE O/P EST MOD 30 MIN: CPT | Mod: PBBFAC,PN,25 | Performed by: STUDENT IN AN ORGANIZED HEALTH CARE EDUCATION/TRAINING PROGRAM

## 2020-12-08 PROCEDURE — 99999 PR PBB SHADOW E&M-EST. PATIENT-LVL IV: CPT | Mod: PBBFAC,,, | Performed by: STUDENT IN AN ORGANIZED HEALTH CARE EDUCATION/TRAINING PROGRAM

## 2020-12-08 RX ORDER — AMOXICILLIN AND CLAVULANATE POTASSIUM 875; 125 MG/1; MG/1
1 TABLET, FILM COATED ORAL 2 TIMES DAILY
Qty: 20 TABLET | Refills: 0 | Status: SHIPPED | OUTPATIENT
Start: 2020-12-08 | End: 2020-12-18

## 2020-12-08 RX ORDER — ALBUTEROL SULFATE 90 UG/1
2 AEROSOL, METERED RESPIRATORY (INHALATION) EVERY 4 HOURS PRN
Qty: 18 G | Refills: 1 | Status: SHIPPED | OUTPATIENT
Start: 2020-12-08 | End: 2022-03-17 | Stop reason: SDUPTHER

## 2020-12-08 RX ORDER — AMOXICILLIN AND CLAVULANATE POTASSIUM 875; 125 MG/1; MG/1
1 TABLET, FILM COATED ORAL 2 TIMES DAILY
Qty: 20 TABLET | Refills: 0 | Status: SHIPPED | OUTPATIENT
Start: 2020-12-08 | End: 2020-12-08

## 2020-12-08 RX ORDER — TRIAMCINOLONE ACETONIDE 40 MG/ML
40 INJECTION, SUSPENSION INTRA-ARTICULAR; INTRAMUSCULAR
Status: COMPLETED | OUTPATIENT
Start: 2020-12-08 | End: 2020-12-08

## 2020-12-08 RX ADMIN — TRIAMCINOLONE ACETONIDE 40 MG: 40 INJECTION, SUSPENSION INTRA-ARTICULAR; INTRAMUSCULAR at 04:12

## 2020-12-08 NOTE — PROGRESS NOTES
"Subjective:       Patient ID: Julissa Singletary is a 70 y.o. female.    Chief Complaint: Cough (Bronchitis)    HPI   70 y.o. female presents to [facility] with complaints of cough with chronic bronchitis.    Has albuterol inhaler at home past 4 days with some relief, but not much help.    Patient reports symptoms worsened about 5 days ago. Usually waits 3 weeks for symptoms, but came early today. Can't walk up steps, causing her to breathe heavy, and comes on annually. Sometimes cough. No loss of sense of taste or smell. No known COVID contacts. Reports no change from typic annual symptoms she presents with.      Review of Systems   Constitutional: Negative for chills, diaphoresis, fatigue and fever.   HENT: Negative for congestion, ear pain, sinus pressure, sinus pain, sneezing and sore throat.    Respiratory: Positive for cough (off yellow phlegm, no blood) and shortness of breath (with stairs).    Cardiovascular: Negative for chest pain and palpitations.   Gastrointestinal: Negative for abdominal pain, diarrhea, nausea and vomiting.   Musculoskeletal: Negative for joint swelling and myalgias.   Skin: Negative for rash and wound.   Neurological: Negative for weakness and headaches.       Objective:      Vitals:    12/08/20 1438   BP: 132/78   BP Location: Left arm   Patient Position: Sitting   Pulse: 82   Temp: 97.4 °F (36.3 °C)   TempSrc: Temporal   SpO2: 97%   Weight: 92.3 kg (203 lb 7.8 oz)   Height: 5' 2" (1.575 m)     Physical Exam  Vitals signs reviewed.   Constitutional:       General: She is not in acute distress.     Appearance: Normal appearance. She is not ill-appearing.   HENT:      Head: Normocephalic and atraumatic.      Right Ear: Tympanic membrane, ear canal and external ear normal.      Left Ear: Tympanic membrane, ear canal and external ear normal.      Nose:      Right Sinus: No maxillary sinus tenderness or frontal sinus tenderness.      Left Sinus: No maxillary sinus tenderness or " frontal sinus tenderness.      Mouth/Throat:      Mouth: Mucous membranes are moist.      Pharynx: Oropharynx is clear. No oropharyngeal exudate or posterior oropharyngeal erythema.   Eyes:      General:         Right eye: No discharge.         Left eye: No discharge.      Conjunctiva/sclera: Conjunctivae normal.   Neck:      Musculoskeletal: Neck supple. No neck rigidity or muscular tenderness.   Cardiovascular:      Rate and Rhythm: Normal rate.   Pulmonary:      Effort: Pulmonary effort is normal.      Breath sounds: Normal breath sounds.   Musculoskeletal:         General: No deformity.   Lymphadenopathy:      Cervical: No cervical adenopathy.   Skin:     General: Skin is warm and dry.      Coloration: Skin is not jaundiced.   Neurological:      General: No focal deficit present.      Mental Status: She is alert and oriented to person, place, and time.   Psychiatric:         Mood and Affect: Mood normal.         Behavior: Behavior normal.             Lab Results   Component Value Date     08/13/2020    K 3.8 08/13/2020     08/13/2020    CO2 28 08/13/2020    BUN 22 08/13/2020    CREATININE 0.8 08/13/2020    ANIONGAP 10 08/13/2020     No results found for: HGBA1C  No results found for: BNP, BNPTRIAGEBLO    Lab Results   Component Value Date    WBC 7.14 08/13/2020    HGB 13.7 08/13/2020    HCT 41.9 08/13/2020     08/13/2020    GRAN 4.2 08/13/2020    GRAN 59.1 08/13/2020     Lab Results   Component Value Date    CHOL 170 10/18/2018    HDL 72 10/18/2018    LDLCALC 62 10/18/2018    TRIG 181 (H) 10/18/2018          Current Outpatient Medications:     alendronate (FOSAMAX) 70 MG tablet, TAKE ONE TABLET BY MOUTH EVERY 7 DAYS, Disp: 12 tablet, Rfl: 3    anastrozole (ARIMIDEX) 1 mg Tab, Take 1 tablet (1 mg total) by mouth once daily., Disp: 90 tablet, Rfl: 3    atorvastatin (LIPITOR) 20 MG tablet, TAKE 1 TABLET BY MOUTH ONCE DAILY, Disp: 90 tablet, Rfl: 3    calcium carbonate (OS-BEBE) 600 mg calcium  (1,500 mg) Tab, Take 600 mg by mouth once., Disp: , Rfl:     carvedilol (COREG) 6.25 MG tablet, TAKE 1 TABLET(6.25 MG) BY MOUTH TWICE DAILY WITH MEALS, Disp: 180 tablet, Rfl: 3    escitalopram oxalate (LEXAPRO) 20 MG tablet, Take 1 tablet (20 mg total) by mouth once daily., Disp: 90 tablet, Rfl: 3    LORazepam (ATIVAN) 1 MG tablet, TAKE 1 TABLET BY MOUTH EVERY NIGHT AT BEDTIME AS NEEDED FOR INSOMNIA, Disp: 30 tablet, Rfl: 1    melatonin 5 mg Tab, Take 1 tablet by mouth nightly as needed., Disp: , Rfl:     TURMERIC ORAL, Take 1 tablet by mouth once daily., Disp: , Rfl:     valsartan-hydrochlorothiazide (DIOVAN-HCT) 320-25 mg per tablet, Take 1 tablet by mouth once daily., Disp: 90 tablet, Rfl: 1    amoxicillin-clavulanate 875-125mg (AUGMENTIN) 875-125 mg per tablet, Take 1 tablet by mouth 2 (two) times daily. for 10 days, Disp: 20 tablet, Rfl: 0    clotrimazole-betamethasone 1-0.05% (LOTRISONE) cream, Apply to groin and labial folds twice daily x 3 weeks, Disp: 45 g, Rfl: 3    fluconazole (DIFLUCAN) 150 MG Tab, Take one tablet every other day x 3 doses, then once weekly x 3 weeks, Disp: 6 tablet, Rfl: 0    HYDROcodone-acetaminophen (NORCO) 5-325 mg per tablet, Take 1 tablet by mouth every 6 (six) hours as needed for Pain (Do not drive after taking this medication.)., Disp: 21 tablet, Rfl: 0    HYDROcodone-acetaminophen (NORCO) 5-325 mg per tablet, Take 1 tablet by mouth every 6 (six) hours as needed for Pain., Disp: 21 tablet, Rfl: 0    oxyCODONE-acetaminophen (PERCOCET) 5-325 mg per tablet, Take 1 tablet by mouth every 4 (four) hours as needed for Pain., Disp: 20 tablet, Rfl: 0    Current Facility-Administered Medications:     triamcinolone acetonide injection 40 mg, 40 mg, Intramuscular, 1 time in Clinic/HOD, Jonathan Magaña MD    Facility-Administered Medications Ordered in Other Visits:     0.9%  NaCl infusion, , Intravenous, Continuous, Shara Wright MD, Stopped at 09/04/20 5146     0.9%  NaCl infusion, , Intravenous, Continuous, Michel Story MD, Last Rate: 70 mL/hr at 09/04/20 0832    ceFAZolin injection 2 g, 2 g, Intravenous, On Call Procedure, Michel Story MD    fentaNYL injection 25 mcg, 25 mcg, Intravenous, Q5 Min PRN, Jorge L Ibanez MD, 50 mcg at 09/04/20 0842    midazolam (VERSED) 1 mg/mL injection 0.5 mg, 0.5 mg, Intravenous, PRN, Jorge L Ibanez MD, 1 mg at 09/04/20 0842        Assessment:       1. Bronchitis           Plan:   Bronchitis  -     triamcinolone acetonide injection 40 mg  -     amoxicillin-clavulanate 875-125mg (AUGMENTIN) 875-125 mg per tablet; Take 1 tablet by mouth 2 (two) times daily. for 10 days  Dispense: 20 tablet; Refill: 0  - Conservative care recommended  - Refilled albuterol inhaler today's visit    Follow up if symptoms worsen or fail to improve, for follow up with Dr. Lowery

## 2020-12-18 PROBLEM — Z79.811 PROPHYLACTIC USE OF ANASTROZOLE (ARIMIDEX): Status: ACTIVE | Noted: 2020-12-18

## 2020-12-18 NOTE — PROGRESS NOTES
Subjective:       Patient ID: Julissa Singletary is a 70 y.o. female.    Chief Complaint: No chief complaint on file.    HPI Mrs Casanova returns today for follow-up.  As of early December she has been on anastrozole in the adjuvant setting.   Of note, her Oncotype score was 2, suggestive of a 97% disease-free survival at 9 years with AIs.    I had the opportunity to review her DEXA scan.  It shows osteopenia approaching osteoporosis on both the hip and the lumbar spine.    Briefly, she  is a 69-year-old postmenopausal female who was recently diagnosed with an infiltrating ductal carcinoma that was ER strongly positive, CO strongly positive and HER2 negative by FISH.  On 09/04/2020 she underwent a lumpectomy and sentinel lymph node biopsy.  She had a grade 1 carcinoma that measured 12 mm in greatest diameter.  Two sentinel lymph nodes were negative.  Resection margins were clear.  Her Oncotype score was 2.      Review of Systems    Overall she feels well. She has tolerated the anastrozole well for the last month and has not experienced any side effects..  She denies any anxiety, depression, easy bruising, fevers, chills, weight loss, nausea, vomiting, diarrhea, constipation, diplopia,  blurred vision, headache, chest pain, palpitations, shortness of breath, breast pain, or abdominal pain, but she does have difficulty ambulating and uses a cane  The remainder of the ten-point ROS, including general, skin, lymph, H/N, cardiorespiratory, GI, , Neuro, Endocrine, and psychiatric is negative.     Objective:      Physical Exam      She is alert, oriented to time, place, person, pleasant, well      nourished, in no acute physical distress.                                    VITAL SIGNS:  Reviewed                                      HEENT:  Normal.  There are no nasal, oral, lip, gingival, auricular, lid,    or conjunctival lesions.  Mucosae are moist and pink, and there is no        thrush.  Pupils are equal,  reactive to light and accommodation.              Extraocular muscle movements are intact.  Dentition is good.  There is no frontal or maxillary tenderness.                                     NECK:  Supple without JVD, adenopathy, or thyromegaly.                       LUNGS:  Clear to auscultation without wheezing, rales, or rhonchi.           CARDIOVASCULAR:  Reveals an S1, S2, no murmurs, no rubs, no gallops.         ABDOMEN:  Soft, nontender, without organomegaly.  Bowel sounds are    present.                                                                     EXTREMITIES:  No cyanosis, clubbing, or edema.   she has a club right foot and her right upper extremity is somewhat contracted.  She uses a cane for ambulation                              BREASTS:  She is status post right lumpectomy with a well-healed incision at the 9 o'clock position.  There is a right axillary sentinel lymph node biopsy incision which is also well healed.     There are no masses in either breast.    There is mild erythema within the radiation field on the right breast.                               LYMPHATIC:  There is no cervical, axillary, or supraclavicular adenopathy.   SKIN:  Warm and moist, without petechiae, rashes, induration, or ecchymoses.           NEUROLOGIC:  DTRs are 0-1+ bilaterally, symmetrical, motor function is 5/5,  and cranial nerves are  within normal limits.      Assessment:       1. Invasive ductal carcinoma of breast, female, right    2. 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       3,    adjuvant use of anastrozole  Plan:         I had a long discussion with her.  She will remain on anastrozole which she will take through the end of November 2025.  I will see her again in 3 months.  Her questions were answered to her satisfaction.

## 2020-12-23 ENCOUNTER — TELEPHONE (OUTPATIENT)
Dept: PRIMARY CARE CLINIC | Facility: CLINIC | Age: 70
End: 2020-12-23

## 2020-12-23 ENCOUNTER — OFFICE VISIT (OUTPATIENT)
Dept: HEMATOLOGY/ONCOLOGY | Facility: CLINIC | Age: 70
End: 2020-12-23
Payer: MEDICARE

## 2020-12-23 VITALS
HEIGHT: 62 IN | DIASTOLIC BLOOD PRESSURE: 86 MMHG | TEMPERATURE: 98 F | RESPIRATION RATE: 20 BRPM | WEIGHT: 201.75 LBS | OXYGEN SATURATION: 97 % | HEART RATE: 75 BPM | BODY MASS INDEX: 37.13 KG/M2 | SYSTOLIC BLOOD PRESSURE: 179 MMHG

## 2020-12-23 DIAGNOSIS — C50.211 MALIGNANT NEOPLASM OF UPPER-INNER QUADRANT OF RIGHT BREAST IN FEMALE, ESTROGEN RECEPTOR POSITIVE: Primary | ICD-10-CM

## 2020-12-23 DIAGNOSIS — Z17.0 MALIGNANT NEOPLASM OF UPPER-INNER QUADRANT OF RIGHT BREAST IN FEMALE, ESTROGEN RECEPTOR POSITIVE: Primary | ICD-10-CM

## 2020-12-23 DIAGNOSIS — Z79.811 PROPHYLACTIC USE OF ANASTROZOLE (ARIMIDEX): ICD-10-CM

## 2020-12-23 PROCEDURE — 99213 OFFICE O/P EST LOW 20 MIN: CPT | Mod: S$PBB,,, | Performed by: INTERNAL MEDICINE

## 2020-12-23 PROCEDURE — 99213 OFFICE O/P EST LOW 20 MIN: CPT | Mod: PBBFAC | Performed by: INTERNAL MEDICINE

## 2020-12-23 PROCEDURE — 99213 PR OFFICE/OUTPT VISIT, EST, LEVL III, 20-29 MIN: ICD-10-PCS | Mod: S$PBB,,, | Performed by: INTERNAL MEDICINE

## 2020-12-23 PROCEDURE — 99999 PR PBB SHADOW E&M-EST. PATIENT-LVL III: CPT | Mod: PBBFAC,,, | Performed by: INTERNAL MEDICINE

## 2020-12-23 PROCEDURE — 99999 PR PBB SHADOW E&M-EST. PATIENT-LVL III: ICD-10-PCS | Mod: PBBFAC,,, | Performed by: INTERNAL MEDICINE

## 2020-12-23 NOTE — TELEPHONE ENCOUNTER
----- Message from Dean Lowery MD sent at 12/23/2020 11:01 AM CST -----  Please schedule patient for an appointment to discuss possible treatment options for borderline osteoporosis  ----- Message -----  From: Brian Calvillo MD  Sent: 12/23/2020   9:00 AM CST  To: Dean Lowery MD    Hi Dr. Lowery,  I saw her today for her breast cancer.  Her DEXA scan shows osteopenia approaching osteoporosis.  She is on aromatase inhibitors and she will need something to prevent osteoporosis.  I told her to reach out to you and discuss.  Of note, she did mention that she has reflux so she may need to be on Prolia  Kind regards,        Brian calvillo  [facility]

## 2021-01-15 DIAGNOSIS — I10 ESSENTIAL HYPERTENSION: ICD-10-CM

## 2021-01-15 RX ORDER — CARVEDILOL 6.25 MG/1
6.25 TABLET ORAL 2 TIMES DAILY
Qty: 180 TABLET | Refills: 1 | Status: SHIPPED | OUTPATIENT
Start: 2021-01-15 | End: 2022-04-01

## 2021-01-21 ENCOUNTER — OFFICE VISIT (OUTPATIENT)
Dept: RADIATION ONCOLOGY | Facility: CLINIC | Age: 71
End: 2021-01-21
Payer: MEDICARE

## 2021-01-21 VITALS
WEIGHT: 204 LBS | SYSTOLIC BLOOD PRESSURE: 173 MMHG | RESPIRATION RATE: 18 BRPM | HEART RATE: 65 BPM | TEMPERATURE: 98 F | DIASTOLIC BLOOD PRESSURE: 83 MMHG | BODY MASS INDEX: 37.31 KG/M2

## 2021-01-21 DIAGNOSIS — Z17.0 MALIGNANT NEOPLASM OF UPPER-INNER QUADRANT OF RIGHT BREAST IN FEMALE, ESTROGEN RECEPTOR POSITIVE: Primary | ICD-10-CM

## 2021-01-21 DIAGNOSIS — C50.211 MALIGNANT NEOPLASM OF UPPER-INNER QUADRANT OF RIGHT BREAST IN FEMALE, ESTROGEN RECEPTOR POSITIVE: Primary | ICD-10-CM

## 2021-01-21 PROCEDURE — 99999 PR PBB SHADOW E&M-EST. PATIENT-LVL IV: CPT | Mod: PBBFAC,,, | Performed by: RADIOLOGY

## 2021-01-21 PROCEDURE — 99499 NO LOS: ICD-10-PCS | Mod: S$PBB,,, | Performed by: RADIOLOGY

## 2021-01-21 PROCEDURE — 99499 UNLISTED E&M SERVICE: CPT | Mod: S$PBB,,, | Performed by: RADIOLOGY

## 2021-01-21 PROCEDURE — 99999 PR PBB SHADOW E&M-EST. PATIENT-LVL IV: ICD-10-PCS | Mod: PBBFAC,,, | Performed by: RADIOLOGY

## 2021-01-21 PROCEDURE — 99214 OFFICE O/P EST MOD 30 MIN: CPT | Mod: PBBFAC | Performed by: RADIOLOGY

## 2021-01-26 DIAGNOSIS — F41.1 GAD (GENERALIZED ANXIETY DISORDER): ICD-10-CM

## 2021-01-26 RX ORDER — ESCITALOPRAM OXALATE 20 MG/1
TABLET ORAL
Qty: 90 TABLET | Refills: 1 | Status: SHIPPED | OUTPATIENT
Start: 2021-01-26 | End: 2021-06-02

## 2021-02-02 ENCOUNTER — OFFICE VISIT (OUTPATIENT)
Dept: PRIMARY CARE CLINIC | Facility: CLINIC | Age: 71
End: 2021-02-02
Payer: MEDICARE

## 2021-02-02 VITALS
RESPIRATION RATE: 18 BRPM | HEIGHT: 62 IN | BODY MASS INDEX: 36.8 KG/M2 | DIASTOLIC BLOOD PRESSURE: 86 MMHG | OXYGEN SATURATION: 98 % | SYSTOLIC BLOOD PRESSURE: 124 MMHG | WEIGHT: 200 LBS | HEART RATE: 72 BPM | TEMPERATURE: 97 F

## 2021-02-02 DIAGNOSIS — Z23 NEED FOR VACCINATION: ICD-10-CM

## 2021-02-02 DIAGNOSIS — E78.5 HYPERLIPIDEMIA, UNSPECIFIED HYPERLIPIDEMIA TYPE: ICD-10-CM

## 2021-02-02 DIAGNOSIS — G81.01 FLACCID HEMIPLEGIA OF RIGHT DOMINANT SIDE DUE TO NONCEREBROVASCULAR ETIOLOGY: ICD-10-CM

## 2021-02-02 DIAGNOSIS — I10 ESSENTIAL HYPERTENSION: ICD-10-CM

## 2021-02-02 DIAGNOSIS — M81.0 OSTEOPOROSIS WITHOUT CURRENT PATHOLOGICAL FRACTURE, UNSPECIFIED OSTEOPOROSIS TYPE: Primary | ICD-10-CM

## 2021-02-02 PROCEDURE — 99214 OFFICE O/P EST MOD 30 MIN: CPT | Mod: S$PBB,,, | Performed by: FAMILY MEDICINE

## 2021-02-02 PROCEDURE — 99999 PR PBB SHADOW E&M-EST. PATIENT-LVL III: CPT | Mod: PBBFAC,,, | Performed by: FAMILY MEDICINE

## 2021-02-02 PROCEDURE — 99213 OFFICE O/P EST LOW 20 MIN: CPT | Mod: PBBFAC,PN | Performed by: FAMILY MEDICINE

## 2021-02-02 PROCEDURE — 99999 PR PBB SHADOW E&M-EST. PATIENT-LVL III: ICD-10-PCS | Mod: PBBFAC,,, | Performed by: FAMILY MEDICINE

## 2021-02-02 PROCEDURE — 99214 PR OFFICE/OUTPT VISIT, EST, LEVL IV, 30-39 MIN: ICD-10-PCS | Mod: S$PBB,,, | Performed by: FAMILY MEDICINE

## 2021-02-02 RX ORDER — ZOSTER VACCINE RECOMBINANT, ADJUVANTED 50 MCG/0.5
0.5 KIT INTRAMUSCULAR ONCE
Qty: 1 EACH | Refills: 0 | Status: SHIPPED | OUTPATIENT
Start: 2021-02-02 | End: 2021-02-02

## 2021-02-02 RX ORDER — ALENDRONATE SODIUM 70 MG/1
70 TABLET ORAL
Qty: 12 TABLET | Refills: 3 | Status: SHIPPED | OUTPATIENT
Start: 2021-02-02 | End: 2022-03-17 | Stop reason: SDUPTHER

## 2021-02-17 ENCOUNTER — TELEPHONE (OUTPATIENT)
Dept: PRIMARY CARE CLINIC | Facility: CLINIC | Age: 71
End: 2021-02-17

## 2021-02-17 DIAGNOSIS — C50.211 MALIGNANT NEOPLASM OF UPPER-INNER QUADRANT OF RIGHT BREAST IN FEMALE, ESTROGEN RECEPTOR POSITIVE: ICD-10-CM

## 2021-02-17 DIAGNOSIS — Z17.0 MALIGNANT NEOPLASM OF UPPER-INNER QUADRANT OF RIGHT BREAST IN FEMALE, ESTROGEN RECEPTOR POSITIVE: ICD-10-CM

## 2021-02-17 RX ORDER — ANASTROZOLE 1 MG/1
1 TABLET ORAL DAILY
Qty: 90 TABLET | Refills: 3 | Status: SHIPPED | OUTPATIENT
Start: 2021-02-17 | End: 2021-12-23

## 2021-03-09 ENCOUNTER — OFFICE VISIT (OUTPATIENT)
Dept: SURGERY | Facility: CLINIC | Age: 71
End: 2021-03-09
Payer: MEDICARE

## 2021-03-09 VITALS
BODY MASS INDEX: 36.8 KG/M2 | HEIGHT: 62 IN | SYSTOLIC BLOOD PRESSURE: 152 MMHG | DIASTOLIC BLOOD PRESSURE: 74 MMHG | HEART RATE: 65 BPM | WEIGHT: 200 LBS

## 2021-03-09 DIAGNOSIS — Z85.3 HISTORY OF RIGHT BREAST CANCER: ICD-10-CM

## 2021-03-09 DIAGNOSIS — Z12.31 BREAST CANCER SCREENING BY MAMMOGRAM: Primary | ICD-10-CM

## 2021-03-09 PROCEDURE — 99213 OFFICE O/P EST LOW 20 MIN: CPT | Mod: S$PBB,,, | Performed by: PHYSICIAN ASSISTANT

## 2021-03-09 PROCEDURE — 99999 PR PBB SHADOW E&M-EST. PATIENT-LVL III: CPT | Mod: PBBFAC,,, | Performed by: PHYSICIAN ASSISTANT

## 2021-03-09 PROCEDURE — 99213 PR OFFICE/OUTPT VISIT, EST, LEVL III, 20-29 MIN: ICD-10-PCS | Mod: S$PBB,,, | Performed by: PHYSICIAN ASSISTANT

## 2021-03-09 PROCEDURE — 99999 PR PBB SHADOW E&M-EST. PATIENT-LVL III: ICD-10-PCS | Mod: PBBFAC,,, | Performed by: PHYSICIAN ASSISTANT

## 2021-03-09 PROCEDURE — 99213 OFFICE O/P EST LOW 20 MIN: CPT | Mod: PBBFAC | Performed by: PHYSICIAN ASSISTANT

## 2021-03-24 ENCOUNTER — TELEPHONE (OUTPATIENT)
Dept: HEMATOLOGY/ONCOLOGY | Facility: CLINIC | Age: 71
End: 2021-03-24

## 2021-03-26 ENCOUNTER — TELEPHONE (OUTPATIENT)
Dept: HEMATOLOGY/ONCOLOGY | Facility: CLINIC | Age: 71
End: 2021-03-26

## 2021-04-12 ENCOUNTER — OFFICE VISIT (OUTPATIENT)
Dept: HEMATOLOGY/ONCOLOGY | Facility: CLINIC | Age: 71
End: 2021-04-12
Payer: MEDICARE

## 2021-04-12 VITALS
RESPIRATION RATE: 18 BRPM | WEIGHT: 205.94 LBS | OXYGEN SATURATION: 98 % | HEIGHT: 62 IN | HEART RATE: 97 BPM | DIASTOLIC BLOOD PRESSURE: 75 MMHG | BODY MASS INDEX: 37.9 KG/M2 | SYSTOLIC BLOOD PRESSURE: 136 MMHG | TEMPERATURE: 97 F

## 2021-04-12 DIAGNOSIS — C50.211 MALIGNANT NEOPLASM OF UPPER-INNER QUADRANT OF RIGHT BREAST IN FEMALE, ESTROGEN RECEPTOR POSITIVE: Primary | ICD-10-CM

## 2021-04-12 DIAGNOSIS — Z79.811 PROPHYLACTIC USE OF ANASTROZOLE (ARIMIDEX): ICD-10-CM

## 2021-04-12 DIAGNOSIS — Z17.0 MALIGNANT NEOPLASM OF UPPER-INNER QUADRANT OF RIGHT BREAST IN FEMALE, ESTROGEN RECEPTOR POSITIVE: Primary | ICD-10-CM

## 2021-04-12 PROCEDURE — 99214 OFFICE O/P EST MOD 30 MIN: CPT | Mod: PBBFAC | Performed by: INTERNAL MEDICINE

## 2021-04-12 PROCEDURE — 99999 PR PBB SHADOW E&M-EST. PATIENT-LVL IV: ICD-10-PCS | Mod: PBBFAC,,, | Performed by: INTERNAL MEDICINE

## 2021-04-12 PROCEDURE — 99999 PR PBB SHADOW E&M-EST. PATIENT-LVL IV: CPT | Mod: PBBFAC,,, | Performed by: INTERNAL MEDICINE

## 2021-04-12 PROCEDURE — 99213 OFFICE O/P EST LOW 20 MIN: CPT | Mod: S$PBB,,, | Performed by: INTERNAL MEDICINE

## 2021-04-12 PROCEDURE — 99213 PR OFFICE/OUTPT VISIT, EST, LEVL III, 20-29 MIN: ICD-10-PCS | Mod: S$PBB,,, | Performed by: INTERNAL MEDICINE

## 2021-07-09 ENCOUNTER — TELEPHONE (OUTPATIENT)
Dept: HEMATOLOGY/ONCOLOGY | Facility: CLINIC | Age: 71
End: 2021-07-09

## 2021-08-16 ENCOUNTER — HOSPITAL ENCOUNTER (OUTPATIENT)
Dept: RADIOLOGY | Facility: HOSPITAL | Age: 71
Discharge: HOME | End: 2021-08-16
Attending: PHYSICIAN ASSISTANT
Payer: MEDICARE

## 2021-08-16 ENCOUNTER — OFFICE VISIT (OUTPATIENT)
Dept: HEMATOLOGY/ONCOLOGY | Facility: CLINIC | Age: 71
End: 2021-08-16
Payer: MEDICARE

## 2021-08-16 VITALS
SYSTOLIC BLOOD PRESSURE: 141 MMHG | HEART RATE: 73 BPM | RESPIRATION RATE: 16 BRPM | WEIGHT: 207.25 LBS | OXYGEN SATURATION: 95 % | HEIGHT: 62 IN | TEMPERATURE: 98 F | DIASTOLIC BLOOD PRESSURE: 107 MMHG | BODY MASS INDEX: 38.14 KG/M2

## 2021-08-16 DIAGNOSIS — Z79.811 PROPHYLACTIC USE OF ANASTROZOLE (ARIMIDEX): ICD-10-CM

## 2021-08-16 DIAGNOSIS — Z17.0 MALIGNANT NEOPLASM OF UPPER-INNER QUADRANT OF RIGHT BREAST IN FEMALE, ESTROGEN RECEPTOR POSITIVE: Primary | ICD-10-CM

## 2021-08-16 DIAGNOSIS — Z12.31 BREAST CANCER SCREENING BY MAMMOGRAM: ICD-10-CM

## 2021-08-16 DIAGNOSIS — C50.211 MALIGNANT NEOPLASM OF UPPER-INNER QUADRANT OF RIGHT BREAST IN FEMALE, ESTROGEN RECEPTOR POSITIVE: Primary | ICD-10-CM

## 2021-08-16 PROCEDURE — 99999 PR PBB SHADOW E&M-EST. PATIENT-LVL IV: ICD-10-PCS | Mod: PBBFAC,,, | Performed by: INTERNAL MEDICINE

## 2021-08-16 PROCEDURE — 77067 SCR MAMMO BI INCL CAD: CPT | Mod: TC

## 2021-08-16 PROCEDURE — 77067 MAMMO DIGITAL SCREENING BILAT WITH TOMO: ICD-10-PCS | Mod: 26,,, | Performed by: RADIOLOGY

## 2021-08-16 PROCEDURE — 99213 PR OFFICE/OUTPT VISIT, EST, LEVL III, 20-29 MIN: ICD-10-PCS | Mod: S$PBB,,, | Performed by: INTERNAL MEDICINE

## 2021-08-16 PROCEDURE — 77063 MAMMO DIGITAL SCREENING BILAT WITH TOMO: ICD-10-PCS | Mod: 26,,, | Performed by: RADIOLOGY

## 2021-08-16 PROCEDURE — 99999 PR PBB SHADOW E&M-EST. PATIENT-LVL IV: CPT | Mod: PBBFAC,,, | Performed by: INTERNAL MEDICINE

## 2021-08-16 PROCEDURE — 99214 OFFICE O/P EST MOD 30 MIN: CPT | Mod: PBBFAC | Performed by: INTERNAL MEDICINE

## 2021-08-16 PROCEDURE — 77063 BREAST TOMOSYNTHESIS BI: CPT | Mod: 26,,, | Performed by: RADIOLOGY

## 2021-08-16 PROCEDURE — 99213 OFFICE O/P EST LOW 20 MIN: CPT | Mod: S$PBB,,, | Performed by: INTERNAL MEDICINE

## 2021-08-16 PROCEDURE — 77067 SCR MAMMO BI INCL CAD: CPT | Mod: 26,,, | Performed by: RADIOLOGY

## 2021-08-17 ENCOUNTER — TELEPHONE (OUTPATIENT)
Dept: RADIOLOGY | Facility: HOSPITAL | Age: 71
End: 2021-08-17

## 2021-08-24 ENCOUNTER — HOSPITAL ENCOUNTER (OUTPATIENT)
Dept: RADIOLOGY | Facility: HOSPITAL | Age: 71
Discharge: HOME | End: 2021-08-24
Attending: INTERNAL MEDICINE
Payer: MEDICARE

## 2021-08-24 DIAGNOSIS — R92.8 ABNORMAL MAMMOGRAM: ICD-10-CM

## 2021-08-24 PROCEDURE — 76642 ULTRASOUND BREAST LIMITED: CPT | Mod: 26,RT,, | Performed by: RADIOLOGY

## 2021-08-24 PROCEDURE — 76642 ULTRASOUND BREAST LIMITED: CPT | Mod: TC,RT

## 2021-08-24 PROCEDURE — 76642 US BREAST RIGHT LIMITED: ICD-10-PCS | Mod: 26,RT,, | Performed by: RADIOLOGY

## 2021-11-09 ENCOUNTER — OFFICE VISIT (OUTPATIENT)
Dept: PRIMARY CARE CLINIC | Facility: CLINIC | Age: 71
End: 2021-11-09
Payer: MEDICARE

## 2021-11-09 VITALS
HEART RATE: 60 BPM | HEIGHT: 62 IN | BODY MASS INDEX: 37.47 KG/M2 | WEIGHT: 203.63 LBS | RESPIRATION RATE: 18 BRPM | OXYGEN SATURATION: 95 % | SYSTOLIC BLOOD PRESSURE: 116 MMHG | DIASTOLIC BLOOD PRESSURE: 62 MMHG

## 2021-11-09 DIAGNOSIS — E78.5 HYPERLIPIDEMIA, UNSPECIFIED HYPERLIPIDEMIA TYPE: ICD-10-CM

## 2021-11-09 DIAGNOSIS — J45.31 MILD PERSISTENT ASTHMA WITH ACUTE EXACERBATION: ICD-10-CM

## 2021-11-09 DIAGNOSIS — Z86.61 HISTORY OF ENCEPHALITIS: ICD-10-CM

## 2021-11-09 DIAGNOSIS — I10 ESSENTIAL HYPERTENSION: ICD-10-CM

## 2021-11-09 DIAGNOSIS — C50.919 INFILTRATING DUCTAL CARCINOMA: ICD-10-CM

## 2021-11-09 DIAGNOSIS — J32.9 SINUSITIS, UNSPECIFIED CHRONICITY, UNSPECIFIED LOCATION: ICD-10-CM

## 2021-11-09 DIAGNOSIS — J40 BRONCHITIS: Primary | ICD-10-CM

## 2021-11-09 DIAGNOSIS — M81.0 OSTEOPOROSIS WITHOUT CURRENT PATHOLOGICAL FRACTURE, UNSPECIFIED OSTEOPOROSIS TYPE: ICD-10-CM

## 2021-11-09 DIAGNOSIS — F41.1 GAD (GENERALIZED ANXIETY DISORDER): ICD-10-CM

## 2021-11-09 DIAGNOSIS — G81.01 FLACCID HEMIPLEGIA OF RIGHT DOMINANT SIDE DUE TO NONCEREBROVASCULAR ETIOLOGY: ICD-10-CM

## 2021-11-09 PROCEDURE — 99999 PR PBB SHADOW E&M-EST. PATIENT-LVL III: ICD-10-PCS | Mod: PBBFAC,,, | Performed by: FAMILY MEDICINE

## 2021-11-09 PROCEDURE — 99214 PR OFFICE/OUTPT VISIT, EST, LEVL IV, 30-39 MIN: ICD-10-PCS | Mod: S$PBB,,, | Performed by: FAMILY MEDICINE

## 2021-11-09 PROCEDURE — 99999 PR PBB SHADOW E&M-EST. PATIENT-LVL III: CPT | Mod: PBBFAC,,, | Performed by: FAMILY MEDICINE

## 2021-11-09 PROCEDURE — 99214 OFFICE O/P EST MOD 30 MIN: CPT | Mod: S$PBB,,, | Performed by: FAMILY MEDICINE

## 2021-11-09 PROCEDURE — 99213 OFFICE O/P EST LOW 20 MIN: CPT | Mod: PBBFAC,PN | Performed by: FAMILY MEDICINE

## 2021-11-09 PROCEDURE — 96372 THER/PROPH/DIAG INJ SC/IM: CPT | Mod: PBBFAC,PN

## 2021-11-09 RX ORDER — PROMETHAZINE HYDROCHLORIDE AND CODEINE PHOSPHATE 6.25; 1 MG/5ML; MG/5ML
5 SOLUTION ORAL EVERY 6 HOURS PRN
Qty: 180 ML | Refills: 0 | Status: SHIPPED | OUTPATIENT
Start: 2021-11-09 | End: 2022-03-17

## 2021-11-09 RX ORDER — CEFDINIR 300 MG/1
300 CAPSULE ORAL 2 TIMES DAILY
Qty: 20 CAPSULE | Refills: 0 | Status: SHIPPED | OUTPATIENT
Start: 2021-11-09 | End: 2021-11-19

## 2021-11-09 RX ORDER — BETAMETHASONE SODIUM PHOSPHATE AND BETAMETHASONE ACETATE 3; 3 MG/ML; MG/ML
6 INJECTION, SUSPENSION INTRA-ARTICULAR; INTRALESIONAL; INTRAMUSCULAR; SOFT TISSUE ONCE
Status: DISCONTINUED | OUTPATIENT
Start: 2021-11-09 | End: 2021-11-09

## 2021-11-09 RX ORDER — METHYLPREDNISOLONE 4 MG/1
TABLET ORAL
Qty: 1 EACH | Refills: 0 | Status: SHIPPED | OUTPATIENT
Start: 2021-11-09 | End: 2022-03-17

## 2021-11-09 RX ORDER — BETAMETHASONE SODIUM PHOSPHATE AND BETAMETHASONE ACETATE 3; 3 MG/ML; MG/ML
12 INJECTION, SUSPENSION INTRA-ARTICULAR; INTRALESIONAL; INTRAMUSCULAR; SOFT TISSUE ONCE
Status: COMPLETED | OUTPATIENT
Start: 2021-11-09 | End: 2021-11-09

## 2021-11-09 RX ADMIN — BETAMETHASONE ACETATE AND BETAMETHASONE SODIUM PHOSPHATE 12 MG: 3; 3 INJECTION, SUSPENSION INTRA-ARTICULAR; INTRALESIONAL; INTRAMUSCULAR; SOFT TISSUE at 03:11

## 2022-01-07 NOTE — PROGRESS NOTES
Subjective:       Patient ID: Julissa Singletary is a 71 y.o. female.    Chief Complaint: No chief complaint on file.    HPI Mrs Singletary returns today for follow-up.  As of early December 2020 she has been on anastrozole in the adjuvant setting.   Of note, her Oncotype score was 2, suggestive of a 97% disease-free survival at 9 years with AIs.    Briefly, she  is a 71-year-old postmenopausal female who was recently diagnosed with an infiltrating ductal carcinoma that was ER strongly positive, CA strongly positive, and HER2 negative by FISH.  On 09/04/2020 she underwent a lumpectomy and sentinel lymph node biopsy.  She had a grade 1 carcinoma that measured 12 mm in greatest diameter.  Two sentinel lymph nodes were negative.  Resection margins were clear.  Her Oncotype score was 2.  Her last mammogram and ultrasound in August 2021 was read as BI-RADS 2.     Review of Systems    Overall she feels well. She has tolerated the anastrozole well for the last 13 months, and has not experienced any side effects.  Her only complaints are chronic, and have to do with her prior history of encephalitis.  She is experiencing right-sided weakness and intermittent numbness.  She uses a cane for ambulation  She also complains of pain in the right axilla since the radiation.  In addition, she complains of generalized pruritus.  She denies any anxiety, depression, easy bruising, fevers, chills, weight loss, nausea, vomiting, diarrhea, constipation, diplopia,  blurred vision, headache, chest pain, palpitations, shortness of breath, breast pain, or abdominal pain, but she does have difficulty ambulating and uses a cane  The remainder of the ten-point ROS, including general, skin, lymph, H/N, cardiorespiratory, GI, , Neuro, Endocrine, and psychiatric is negative.     Objective:      Physical Exam      She is alert, oriented to time, place, person, pleasant, well      nourished, in no acute physical distress.    She is unable to  climb in the examination table and she was examined in the sitting position.                                   VITAL SIGNS:  Reviewed                                      HEENT:  Normal.  There are no nasal, oral, lip, gingival, auricular, lid,    or conjunctival lesions.  Mucosae are moist and pink, and there is no        thrush.  Pupils are equal, reactive to light and accommodation.              Extraocular muscle movements are intact.  Dentition is good.  There is no frontal or maxillary tenderness.                                     NECK:  Supple without JVD, adenopathy, or thyromegaly.                       LUNGS:  Clear to auscultation without wheezing, rales, or rhonchi.           CARDIOVASCULAR:  Reveals an S1, S2, no murmurs, no rubs, no gallops.         ABDOMEN:  Soft, nontender, without organomegaly.  Bowel sounds are    present.                                                                     EXTREMITIES:  No cyanosis, clubbing, or edema.   She has a club right foot and her right upper extremity is somewhat contracted.  She uses a cane for ambulation                              BREASTS:  She is status post right lumpectomy with a well-healed incision at the 9 o'clock position.  There is a right axillary sentinel lymph node biopsy incision which is also well healed.     There are no masses in either breast.    There is still mild peau d'orange changes within the radiation field on the right breast.                               LYMPHATIC:  There is no cervical, axillary, or supraclavicular adenopathy.   SKIN:  Warm and moist, without petechiae, rashes, induration, or ecchymoses.   She has multiple excoriations on her face from scratching.          NEUROLOGIC:  DTRs are 0-1+ bilaterally, symmetrical, motor function is 5/5,  and cranial nerves are  within normal limits.      Assessment:       1. Invasive ductal carcinoma of breast, female, right    2. History of encephalitis, with significant residual  neurological and motor deficits.       3,    Adjuvant use of anastrozole  Plan:         I had a long discussion with her.  She will remain on anastrozole which she will take through the end of November 2025.  I will see her again in 4 months.   Her mammogram will be repeated in 8 months  Her questions were answered to her satisfaction.

## 2022-01-10 ENCOUNTER — OFFICE VISIT (OUTPATIENT)
Dept: HEMATOLOGY/ONCOLOGY | Facility: CLINIC | Age: 72
End: 2022-01-10
Payer: MEDICARE

## 2022-01-10 VITALS
SYSTOLIC BLOOD PRESSURE: 144 MMHG | HEIGHT: 62 IN | WEIGHT: 207.25 LBS | HEART RATE: 69 BPM | RESPIRATION RATE: 18 BRPM | TEMPERATURE: 98 F | BODY MASS INDEX: 38.14 KG/M2 | OXYGEN SATURATION: 95 % | DIASTOLIC BLOOD PRESSURE: 78 MMHG

## 2022-01-10 DIAGNOSIS — C50.211 MALIGNANT NEOPLASM OF UPPER-INNER QUADRANT OF RIGHT BREAST IN FEMALE, ESTROGEN RECEPTOR POSITIVE: Primary | ICD-10-CM

## 2022-01-10 DIAGNOSIS — Z17.0 MALIGNANT NEOPLASM OF UPPER-INNER QUADRANT OF RIGHT BREAST IN FEMALE, ESTROGEN RECEPTOR POSITIVE: Primary | ICD-10-CM

## 2022-01-10 DIAGNOSIS — M21.371 ACQUIRED RIGHT FOOT DROP: ICD-10-CM

## 2022-01-10 DIAGNOSIS — Z79.811 PROPHYLACTIC USE OF ANASTROZOLE (ARIMIDEX): ICD-10-CM

## 2022-01-10 PROCEDURE — 99999 PR PBB SHADOW E&M-EST. PATIENT-LVL III: ICD-10-PCS | Mod: PBBFAC,,, | Performed by: INTERNAL MEDICINE

## 2022-01-10 PROCEDURE — 99213 OFFICE O/P EST LOW 20 MIN: CPT | Mod: S$PBB,,, | Performed by: INTERNAL MEDICINE

## 2022-01-10 PROCEDURE — 99999 PR PBB SHADOW E&M-EST. PATIENT-LVL III: CPT | Mod: PBBFAC,,, | Performed by: INTERNAL MEDICINE

## 2022-01-10 PROCEDURE — 99213 PR OFFICE/OUTPT VISIT, EST, LEVL III, 20-29 MIN: ICD-10-PCS | Mod: S$PBB,,, | Performed by: INTERNAL MEDICINE

## 2022-01-10 PROCEDURE — 99213 OFFICE O/P EST LOW 20 MIN: CPT | Mod: PBBFAC | Performed by: INTERNAL MEDICINE

## 2022-01-13 ENCOUNTER — OFFICE VISIT (OUTPATIENT)
Dept: OBSTETRICS AND GYNECOLOGY | Facility: CLINIC | Age: 72
End: 2022-01-13
Payer: MEDICARE

## 2022-01-13 VITALS
SYSTOLIC BLOOD PRESSURE: 140 MMHG | DIASTOLIC BLOOD PRESSURE: 80 MMHG | HEIGHT: 62 IN | WEIGHT: 203.81 LBS | BODY MASS INDEX: 37.51 KG/M2

## 2022-01-13 DIAGNOSIS — R21 VULVAR RASH: Primary | ICD-10-CM

## 2022-01-13 PROCEDURE — 99999 PR PBB SHADOW E&M-EST. PATIENT-LVL III: ICD-10-PCS | Mod: PBBFAC,,, | Performed by: NURSE PRACTITIONER

## 2022-01-13 PROCEDURE — 99999 PR PBB SHADOW E&M-EST. PATIENT-LVL III: CPT | Mod: PBBFAC,,, | Performed by: NURSE PRACTITIONER

## 2022-01-13 PROCEDURE — 99213 OFFICE O/P EST LOW 20 MIN: CPT | Mod: PBBFAC,PN | Performed by: NURSE PRACTITIONER

## 2022-01-13 PROCEDURE — 99213 PR OFFICE/OUTPT VISIT, EST, LEVL III, 20-29 MIN: ICD-10-PCS | Mod: S$PBB,,, | Performed by: NURSE PRACTITIONER

## 2022-01-13 PROCEDURE — 99213 OFFICE O/P EST LOW 20 MIN: CPT | Mod: S$PBB,,, | Performed by: NURSE PRACTITIONER

## 2022-01-13 RX ORDER — NYSTATIN 100000 U/G
OINTMENT TOPICAL 2 TIMES DAILY PRN
Qty: 30 G | Refills: 5 | Status: SHIPPED | OUTPATIENT
Start: 2022-01-13 | End: 2023-05-30

## 2022-01-13 RX ORDER — NYSTATIN 100000 [USP'U]/G
POWDER TOPICAL
Qty: 60 G | Refills: 2 | Status: SHIPPED | OUTPATIENT
Start: 2022-01-13 | End: 2023-01-13

## 2022-01-13 RX ORDER — FLUCONAZOLE 150 MG/1
150 TABLET ORAL
Qty: 3 TABLET | Refills: 0 | Status: SHIPPED | OUTPATIENT
Start: 2022-01-13 | End: 2022-01-16

## 2022-01-18 NOTE — PROGRESS NOTES
CC: Yeast Infection    HPI: Pt is a 71 y.o.  female who presents for yeast infection x 10 days. Reports itching to vulva and bilateral groin. Denies DM. Reports urinary incontinence for which she wears incontinence pads.     ROS:  GENERAL: Feeling well overall. Denies fever or chills.   SKIN: Denies rash or lesions.   REPRODUCTIVE: See HPI.   PSYCHIATRIC: Denies depression, anxiety or mood swings.    PE:   APPEARANCE: Well nourished, well developed, White female in no acute distress.  VULVA: No lesions. + demarcated erythema to bilateral labia majora and bilateral groin.   URETHRAL MEATUS: Normal size and location, no lesions, no prolapse.  URETHRA: No masses, tenderness, or prolapse.      Diagnosis:  1. Vulvar rash      Plan:   Orders Placed This Encounter    fluconazole (DIFLUCAN) 150 MG Tab    nystatin (MYCOSTATIN) powder    nystatin (MYCOSTATIN) ointment     Diflucan and mycostatin ointment now.   Mycostatin powder to prevent reoccurrence.    Follow-up PRN no resolution of symptoms.

## 2022-01-28 RX ORDER — VALSARTAN AND HYDROCHLOROTHIAZIDE 320; 25 MG/1; MG/1
TABLET, FILM COATED ORAL
Qty: 30 TABLET | Refills: 0 | Status: SHIPPED | OUTPATIENT
Start: 2022-01-28 | End: 2022-03-17 | Stop reason: SDUPTHER

## 2022-03-04 ENCOUNTER — TELEPHONE (OUTPATIENT)
Dept: OBSTETRICS AND GYNECOLOGY | Facility: CLINIC | Age: 72
End: 2022-03-04
Payer: MEDICARE

## 2022-03-04 DIAGNOSIS — N76.0 ACUTE VAGINITIS: Primary | ICD-10-CM

## 2022-03-04 RX ORDER — FLUCONAZOLE 200 MG/1
200 TABLET ORAL EVERY OTHER DAY
Qty: 2 TABLET | Refills: 0 | Status: SHIPPED | OUTPATIENT
Start: 2022-03-04 | End: 2022-03-07

## 2022-03-04 NOTE — TELEPHONE ENCOUNTER
----- Message from LEOBARDO Mayorga sent at 3/4/2022  9:31 AM CST -----  Please schedule her at 840 on Tuesday. I just spoke with her and she si aware of the appt time. Thanks!    ----- Message -----  From: Alexandra Thompson MA  Sent: 3/3/2022   3:12 PM CST  To: LEOBARDO Mayorga    Please advise   ----- Message -----  From: Alaina Matute  Sent: 3/3/2022   9:04 AM CST  To: [facility]    Julissa Singletary calling regarding vaginal itching. She was seen in Jan.  She stated the cream did not help.  Call back [phone]      [id number] [address] [address]  Phone: [phone] Fax: [phone]

## 2022-03-04 NOTE — TELEPHONE ENCOUNTER
Called patient to discuss symptoms. Reports same rash to legs and vulvar that she was experiencing at last visit 1/13/22. She states cream sent at time of visit did not help. Will see her on Tuesday but sending diflucan until them. All questions answered. Pt Verbalized understanding.     ----- Message from Alexandra Thompson MA sent at 3/3/2022  4:36 PM CST -----  Regarding: FW: pt  Please advise   ----- Message -----  From: Екатерина Jules  Sent: 3/3/2022   3:29 PM CST  To: [facility]  Subject: pt                                               Pt is calling to speak with the nurse pt has a yeast infection since Jan off and on pt would like for something to be called in. Can you please call pt at [phone].    MARIS

## 2022-03-08 ENCOUNTER — OFFICE VISIT (OUTPATIENT)
Dept: OBSTETRICS AND GYNECOLOGY | Facility: CLINIC | Age: 72
End: 2022-03-08
Payer: MEDICARE

## 2022-03-08 VITALS
SYSTOLIC BLOOD PRESSURE: 150 MMHG | WEIGHT: 205.25 LBS | HEIGHT: 62 IN | DIASTOLIC BLOOD PRESSURE: 86 MMHG | BODY MASS INDEX: 37.77 KG/M2

## 2022-03-08 DIAGNOSIS — N76.0 ACUTE VAGINITIS: Primary | ICD-10-CM

## 2022-03-08 PROCEDURE — 99213 PR OFFICE/OUTPT VISIT, EST, LEVL III, 20-29 MIN: ICD-10-PCS | Mod: S$PBB,,, | Performed by: NURSE PRACTITIONER

## 2022-03-08 PROCEDURE — 99999 PR PBB SHADOW E&M-EST. PATIENT-LVL III: ICD-10-PCS | Mod: PBBFAC,,, | Performed by: NURSE PRACTITIONER

## 2022-03-08 PROCEDURE — 87481 CANDIDA DNA AMP PROBE: CPT | Mod: 59 | Performed by: NURSE PRACTITIONER

## 2022-03-08 PROCEDURE — 99213 OFFICE O/P EST LOW 20 MIN: CPT | Mod: S$PBB,,, | Performed by: NURSE PRACTITIONER

## 2022-03-08 PROCEDURE — 99213 OFFICE O/P EST LOW 20 MIN: CPT | Mod: PBBFAC,PN | Performed by: NURSE PRACTITIONER

## 2022-03-08 PROCEDURE — 99999 PR PBB SHADOW E&M-EST. PATIENT-LVL III: CPT | Mod: PBBFAC,,, | Performed by: NURSE PRACTITIONER

## 2022-03-08 NOTE — PROGRESS NOTES
"  Chief Complaint: Vaginitis     HPI:      Julissa Singletary is a 71 y.o.  who presents with complaints of severe vaginal and vulvar itching 6 days ago. Patient called off and I sent in 200mg of diflucan x 2 doses QOD. She states the diflucan has relieved the symptoms completely but she would like to be assessed to make sure she is healed.      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        ROS:     GENERAL: Denies weight gain or weight loss. Feeling well overall.   ABDOMEN: Denies abdominal pain, constipation, diarrhea, nausea, vomiting, change in appetite.   URINARY: Denies frequency, dysuria, hematuria.    Physical Exam:      PHYSICAL EXAM:   Vitals:    22 0905   BP: (!) 150/86   Weight: 93.1 kg (205 lb 4 oz)   Height: 5' 2" (1.575 m)   PainSc: 0-No pain     Body mass index is 37.54 kg/m².    General: No acute distress, alert and engaged  Pelvic: External genitalia with mild erythema to bilateral labia major-appears nearly healed. urethra within normal limits.    Vagina without lesions, without discharge, without erythema, without ulcers.   Cervix absent    Assessment/Plan:     Acute vaginitis  -     Vaginosis Screen by DNA Probe      Advised to use nystatin ointment to external labia for a few more days.   Will call patient with vaginal swab results.  Follow up PRN or if symptoms return.  Advised annual with PCP          "

## 2022-03-09 LAB
BACTERIAL VAGINOSIS DNA: NEGATIVE
CANDIDA GLABRATA DNA: NEGATIVE
CANDIDA KRUSEI DNA: NEGATIVE
CANDIDA RRNA VAG QL PROBE: NEGATIVE
T VAGINALIS RRNA GENITAL QL PROBE: NEGATIVE

## 2022-03-17 ENCOUNTER — OFFICE VISIT (OUTPATIENT)
Dept: PRIMARY CARE CLINIC | Facility: CLINIC | Age: 72
End: 2022-03-17
Payer: MEDICARE

## 2022-03-17 VITALS
DIASTOLIC BLOOD PRESSURE: 74 MMHG | HEIGHT: 62 IN | WEIGHT: 204.69 LBS | RESPIRATION RATE: 18 BRPM | BODY MASS INDEX: 37.67 KG/M2 | HEART RATE: 59 BPM | SYSTOLIC BLOOD PRESSURE: 112 MMHG | OXYGEN SATURATION: 98 %

## 2022-03-17 DIAGNOSIS — Z00.00 ANNUAL PHYSICAL EXAM: Primary | ICD-10-CM

## 2022-03-17 DIAGNOSIS — Z23 NEED FOR VACCINATION: ICD-10-CM

## 2022-03-17 DIAGNOSIS — I10 ESSENTIAL HYPERTENSION: ICD-10-CM

## 2022-03-17 DIAGNOSIS — G81.01 FLACCID HEMIPLEGIA OF RIGHT DOMINANT SIDE DUE TO NONCEREBROVASCULAR ETIOLOGY: ICD-10-CM

## 2022-03-17 DIAGNOSIS — M81.0 OSTEOPOROSIS WITHOUT CURRENT PATHOLOGICAL FRACTURE, UNSPECIFIED OSTEOPOROSIS TYPE: ICD-10-CM

## 2022-03-17 DIAGNOSIS — E78.5 HYPERLIPIDEMIA, UNSPECIFIED HYPERLIPIDEMIA TYPE: ICD-10-CM

## 2022-03-17 DIAGNOSIS — F41.1 GAD (GENERALIZED ANXIETY DISORDER): ICD-10-CM

## 2022-03-17 DIAGNOSIS — E66.01 SEVERE OBESITY (BMI 35.0-39.9) WITH COMORBIDITY: ICD-10-CM

## 2022-03-17 PROCEDURE — 99999 PR PBB SHADOW E&M-EST. PATIENT-LVL III: CPT | Mod: PBBFAC,,, | Performed by: FAMILY MEDICINE

## 2022-03-17 PROCEDURE — 99214 OFFICE O/P EST MOD 30 MIN: CPT | Mod: S$PBB,,, | Performed by: FAMILY MEDICINE

## 2022-03-17 PROCEDURE — 99213 OFFICE O/P EST LOW 20 MIN: CPT | Mod: PBBFAC,PN | Performed by: FAMILY MEDICINE

## 2022-03-17 PROCEDURE — 99999 PR PBB SHADOW E&M-EST. PATIENT-LVL III: ICD-10-PCS | Mod: PBBFAC,,, | Performed by: FAMILY MEDICINE

## 2022-03-17 PROCEDURE — 99214 PR OFFICE/OUTPT VISIT, EST, LEVL IV, 30-39 MIN: ICD-10-PCS | Mod: S$PBB,,, | Performed by: FAMILY MEDICINE

## 2022-03-17 RX ORDER — ALBUTEROL SULFATE 90 UG/1
2 AEROSOL, METERED RESPIRATORY (INHALATION) EVERY 4 HOURS PRN
Qty: 18 G | Refills: 1 | Status: SHIPPED | OUTPATIENT
Start: 2022-03-17

## 2022-03-17 RX ORDER — ESCITALOPRAM OXALATE 20 MG/1
20 TABLET ORAL DAILY
Qty: 90 TABLET | Refills: 3 | Status: SHIPPED | OUTPATIENT
Start: 2022-03-17 | End: 2023-03-18

## 2022-03-17 RX ORDER — ZOSTER VACCINE RECOMBINANT, ADJUVANTED 50 MCG/0.5
0.5 KIT INTRAMUSCULAR ONCE
Qty: 1 EACH | Refills: 0 | Status: SHIPPED | OUTPATIENT
Start: 2022-03-17 | End: 2022-03-17

## 2022-03-17 RX ORDER — VALSARTAN AND HYDROCHLOROTHIAZIDE 320; 25 MG/1; MG/1
1 TABLET, FILM COATED ORAL DAILY
Qty: 90 TABLET | Refills: 3 | Status: SHIPPED | OUTPATIENT
Start: 2022-03-17 | End: 2023-03-19

## 2022-03-17 RX ORDER — ALENDRONATE SODIUM 70 MG/1
70 TABLET ORAL
Qty: 12 TABLET | Refills: 3 | Status: SHIPPED | OUTPATIENT
Start: 2022-03-17 | End: 2023-06-13

## 2022-03-17 NOTE — PROGRESS NOTES
"Subjective:       Patient ID: Julissa Singletary is a 71 y.o. female.    Chief Complaint: Annual Exam     Here for regular checkup.  Generally feeling well, no specific complaints or concerns.  Denies chest pain, palpitations or shortness of breath.  Chronic right-sided weakness.  Falls occasionally, no significant injury.  Denies dizziness or lightheadedness.  Compliant with all medications.    Review of Systems   Constitutional: Negative for fever.   HENT: Negative for trouble swallowing.    Respiratory: Negative for shortness of breath.    Cardiovascular: Negative for chest pain.   Gastrointestinal: Negative for nausea and vomiting.   Skin: Negative for wound.   Allergic/Immunologic: Negative for immunocompromised state.   Neurological: Positive for weakness.   Psychiatric/Behavioral: Negative for agitation and confusion.       Objective:      Vitals:    03/17/22 1123 03/17/22 1132   BP: 116/62 112/74   BP Location: Left arm Left arm   Patient Position: Sitting Sitting   BP Method: Large (Manual) Large (Manual)   Pulse: (!) 59    Resp: 18    SpO2: 98%    Weight: 92.8 kg (204 lb 11.2 oz)    Height: 5' 2" (1.575 m)      Physical Exam  Vitals and nursing note reviewed.   Constitutional:       Appearance: She is well-developed.   HENT:      Head: Normocephalic and atraumatic.   Cardiovascular:      Rate and Rhythm: Normal rate and regular rhythm.      Heart sounds: Normal heart sounds.   Pulmonary:      Effort: Pulmonary effort is normal.      Breath sounds: Normal breath sounds.   Skin:     General: Skin is warm and dry.   Neurological:      Mental Status: She is alert and oriented to person, place, and time.      Comments: Spastic right-sided hemiparesis with right footdrop   Psychiatric:         Behavior: Behavior normal.         Thought Content: Thought content normal.         Judgment: Judgment normal.         Lab Results   Component Value Date    WBC 7.14 08/13/2020    HGB 13.7 08/13/2020    HCT 41.9 " 08/13/2020     08/13/2020    CHOL 180 02/03/2021    TRIG 83 02/03/2021    HDL 71 02/03/2021    ALT 20 02/03/2021    AST 20 02/03/2021     02/03/2021    K 3.8 02/03/2021    CL 99 (L) 02/03/2021    CREATININE 0.9 02/03/2021    BUN 23 02/03/2021    CO2 29 02/03/2021    TSH 4.1 (H) 12/06/2004    INR 1.0 12/15/2004      Assessment:       1. Annual physical exam    2. Osteoporosis without current pathological fracture, unspecified osteoporosis type    3. OLI (generalized anxiety disorder)    4. Flaccid hemiplegia of right dominant side due to noncerebrovascular etiology    5. Severe obesity (BMI 35.0-39.9) with comorbidity    6. Essential hypertension    7. Need for vaccination    8. Hyperlipidemia, unspecified hyperlipidemia type        Plan:       Annual physical exam  -     CBC Auto Differential; Future; Expected date: 03/17/2022  -     Comprehensive Metabolic Panel; Future; Expected date: 03/17/2022  -     Lipid Panel; Future; Expected date: 03/17/2022    Osteoporosis without current pathological fracture, unspecified osteoporosis type  -     alendronate (FOSAMAX) 70 MG tablet; Take 1 tablet (70 mg total) by mouth every 7 days.  Dispense: 12 tablet; Refill: 3    OLI (generalized anxiety disorder)  -     EScitalopram oxalate (LEXAPRO) 20 MG tablet; Take 1 tablet (20 mg total) by mouth once daily.  Dispense: 90 tablet; Refill: 3    Flaccid hemiplegia of right dominant side due to noncerebrovascular etiology    Severe obesity (BMI 35.0-39.9) with comorbidity    Essential hypertension  -     valsartan-hydrochlorothiazide (DIOVAN-HCT) 320-25 mg per tablet; Take 1 tablet by mouth once daily.  Dispense: 90 tablet; Refill: 3  -     CBC Auto Differential; Future; Expected date: 03/17/2022    Need for vaccination  -     varicella-zoster gE-AS01B, PF, (SHINGRIX, PF,) 50 mcg/0.5 mL injection; Inject 0.5 mLs into the muscle once. for 1 dose  Dispense: 1 each; Refill: 0    Hyperlipidemia, unspecified hyperlipidemia  type  -     Comprehensive Metabolic Panel; Future; Expected date: 03/17/2022  -     Lipid Panel; Future; Expected date: 03/17/2022    Other orders  -     albuterol (VENTOLIN HFA) 90 mcg/actuation inhaler; Inhale 2 puffs into the lungs every 4 (four) hours as needed for Wheezing or Shortness of Breath. Rescue  Dispense: 18 g; Refill: 1      Medication List with Changes/Refills   New Medications    VARICELLA-ZOSTER [id number], PF, (SHINGRIX, PF,) 50 MCG/0.5 ML INJECTION    Inject 0.5 mLs into the muscle once. for 1 dose   Current Medications    ANASTROZOLE (ARIMIDEX) 1 MG TAB    TAKE ONE TABLET BY MOUTH ONCE DAILY    ATORVASTATIN (LIPITOR) 20 MG TABLET    TAKE ONE TABLET BY MOUTH ONCE DAILY    CALCIUM CARBONATE (OS-BEBE) 600 MG CALCIUM (1,500 MG) TAB    Take 600 mg by mouth once.    CARVEDILOL (COREG) 6.25 MG TABLET    Take 1 tablet (6.25 mg total) by mouth 2 (two) times a day.    MELATONIN 5 MG TAB    Take 1 tablet by mouth nightly as needed.    NYSTATIN (MYCOSTATIN) OINTMENT    Apply topically 2 (two) times daily as needed (rash).    NYSTATIN (MYCOSTATIN) POWDER    Apply to affected area 3 times daily    TURMERIC ORAL    Take 1 tablet by mouth once daily.   Changed and/or Refilled Medications    Modified Medication Previous Medication    ALBUTEROL (VENTOLIN HFA) 90 MCG/ACTUATION INHALER albuterol (VENTOLIN HFA) 90 mcg/actuation inhaler       Inhale 2 puffs into the lungs every 4 (four) hours as needed for Wheezing or Shortness of Breath. Rescue    Inhale 2 puffs into the lungs every 4 (four) hours as needed for Wheezing or Shortness of Breath. Rescue    ALENDRONATE (FOSAMAX) 70 MG TABLET alendronate (FOSAMAX) 70 MG tablet       Take 1 tablet (70 mg total) by mouth every 7 days.    Take 1 tablet (70 mg total) by mouth every 7 days.    ESCITALOPRAM OXALATE (LEXAPRO) 20 MG TABLET EScitalopram oxalate (LEXAPRO) 20 MG tablet       Take 1 tablet (20 mg total) by mouth once daily.    TAKE ONE TABLET BY MOUTH ONCE DAILY      VALSARTAN-HYDROCHLOROTHIAZIDE (DIOVAN-HCT) 320-25 MG PER TABLET valsartan-hydrochlorothiazide (DIOVAN-HCT) 320-25 mg per tablet       Take 1 tablet by mouth once daily.    TAKE ONE TABLET BY MOUTH ONCE DAILY   Discontinued Medications    METHYLPREDNISOLONE (MEDROL DOSEPACK) 4 MG TABLET    Start Medrol Dosepak Wednesday morning-- use as directed    PROMETHAZINE-CODEINE 6.25-10 MG/5 ML (PHENERGAN WITH CODEINE) 6.25-10 MG/5 ML SYRUP    Take 5 mLs by mouth every 6 (six) hours as needed for Cough.

## 2022-03-31 DIAGNOSIS — I10 ESSENTIAL HYPERTENSION: ICD-10-CM

## 2022-03-31 NOTE — TELEPHONE ENCOUNTER
No new care gaps identified.  Powered by [employer]. Reference number: [id number].   3/31/2022 10:49:35 AM CDT

## 2022-04-01 RX ORDER — CARVEDILOL 6.25 MG/1
6.25 TABLET ORAL 2 TIMES DAILY
Qty: 180 TABLET | Refills: 3 | Status: SHIPPED | OUTPATIENT
Start: 2022-04-01 | End: 2023-04-24

## 2022-04-01 NOTE — TELEPHONE ENCOUNTER
Refill Routing Note   Medication(s) are not appropriate for processing by [facility] for the following reason(s):      - Patient states taking requested medication(s) differently than prescribed    [facility] action(s):  Defer       Medication Therapy Plan: patient taking once daily  --->Care Gap information included in message below if applicable.   Medication reconciliation completed: No   Automatic Epic Generated Protocol Data:        Requested Prescriptions   Pending Prescriptions Disp Refills    carvediloL (COREG) 6.25 MG tablet [Pharmacy Med Name: CARVEDILOL 6.25 MG       TAB] 180 tablet 1     Sig: TAKE 1 TABLET (6.25 MG TOTAL) BY MOUTH 2 (TWO) TIMES A DAY.       Cardiovascular:  Beta Blockers Passed - 4/1/2022 11:18 AM        Passed - Patient is at least 18 years old        Passed - Last BP in normal range within 360 days     BP Readings from Last 3 Encounters:   03/17/22 112/74   03/08/22 (!) 150/86   01/13/22 (!) 140/80               Passed - Last Heart Rate in normal range within 360 days     Pulse Readings from Last 1 Encounters:   03/17/22 (!) 59              Passed - Valid encounter within last 15 months     Recent Visits  Date Type Provider Dept   03/17/22 Office Visit Dean Lowery MD [facility]   02/02/21 Office Visit Dean Lowery MD [facility]   Showing recent visits within past 720 days and meeting all other requirements  Future Appointments  No visits were found meeting these conditions.  Showing future appointments within next 150 days and meeting all other requirements                Passed - Matches previous order       Previous Authorizing Provider: Dean Lowery MD (carvediloL (COREG) 6.25 MG tablet)  Previous [facility]acy: [id number] [address]            Passed - No ED/Hospital visits since last PCP visit     Last PCP Visit: 3/17/2022 Last Admission: 9/4/2020 Last ED Visit: 6/27/2020                Appointments  past  12m or future 3m with PCP    Date Provider   Last Visit   3/17/2022 Dean Lowery MD   Next Visit   Visit date not found Dean Lowery MD   ED visits in past 90 days: 0        Note composed:11:20 AM 04/01/2022

## 2022-06-08 ENCOUNTER — OFFICE VISIT (OUTPATIENT)
Dept: HEMATOLOGY/ONCOLOGY | Facility: CLINIC | Age: 72
End: 2022-06-08
Payer: MEDICARE

## 2022-06-08 VITALS
RESPIRATION RATE: 18 BRPM | BODY MASS INDEX: 38.14 KG/M2 | WEIGHT: 207.25 LBS | OXYGEN SATURATION: 95 % | HEIGHT: 62 IN | TEMPERATURE: 98 F | HEART RATE: 74 BPM | DIASTOLIC BLOOD PRESSURE: 70 MMHG | SYSTOLIC BLOOD PRESSURE: 134 MMHG

## 2022-06-08 DIAGNOSIS — C50.211 MALIGNANT NEOPLASM OF UPPER-INNER QUADRANT OF RIGHT BREAST IN FEMALE, ESTROGEN RECEPTOR POSITIVE: Primary | ICD-10-CM

## 2022-06-08 DIAGNOSIS — Z79.811 PROPHYLACTIC USE OF ANASTROZOLE (ARIMIDEX): ICD-10-CM

## 2022-06-08 DIAGNOSIS — Z17.0 MALIGNANT NEOPLASM OF UPPER-INNER QUADRANT OF RIGHT BREAST IN FEMALE, ESTROGEN RECEPTOR POSITIVE: Primary | ICD-10-CM

## 2022-06-08 PROCEDURE — 99999 PR PBB SHADOW E&M-EST. PATIENT-LVL III: ICD-10-PCS | Mod: PBBFAC,,, | Performed by: INTERNAL MEDICINE

## 2022-06-08 PROCEDURE — 99213 PR OFFICE/OUTPT VISIT, EST, LEVL III, 20-29 MIN: ICD-10-PCS | Mod: S$PBB,,, | Performed by: INTERNAL MEDICINE

## 2022-06-08 PROCEDURE — 99999 PR PBB SHADOW E&M-EST. PATIENT-LVL III: CPT | Mod: PBBFAC,,, | Performed by: INTERNAL MEDICINE

## 2022-06-08 PROCEDURE — 99213 OFFICE O/P EST LOW 20 MIN: CPT | Mod: S$PBB,,, | Performed by: INTERNAL MEDICINE

## 2022-06-08 PROCEDURE — 99213 OFFICE O/P EST LOW 20 MIN: CPT | Mod: PBBFAC | Performed by: INTERNAL MEDICINE

## 2022-06-08 NOTE — PROGRESS NOTES
Subjective:       Patient ID: Julissa Singletary is a 71 y.o. female.    Chief Complaint: No chief complaint on file.    HPI Mrs Singletary returns today for follow-up.  I had last seen her in January 2022. As of early December 2020 she has been on anastrozole in the adjuvant setting.   Of note, her Oncotype score was 2, suggestive of a 97% disease-free survival at 9 years with AIs.    Briefly, she  is a 71-year-old postmenopausal female who was recently diagnosed with an infiltrating ductal carcinoma that was ER strongly positive, NM strongly positive, and HER2 negative by FISH.  On 09/04/2020 she underwent a lumpectomy and sentinel lymph node biopsy.  She had a grade 1 carcinoma that measured 12 mm in greatest diameter.  Two sentinel lymph nodes were negative.  Resection margins were clear.  Her Oncotype score was 2.  Her last mammogram and ultrasound in August 2021 was read as BI-RADS 2.     Review of Systems    Overall she feels well. She has tolerated the anastrozole well, and has not experienced any side effects.  Her only complaints are chronic, and have to do with her prior history of encephalitis.  She is experiencing right-sided weakness and intermittent numbness.  She uses a cane for ambulation  She also complains of discomfort in the right axilla since the radiation.  She denies any anxiety, depression, easy bruising, fevers, chills, weight loss, nausea, vomiting, diarrhea, constipation, diplopia,  blurred vision, headache, chest pain, palpitations, shortness of breath, breast pain, or abdominal pain, but she does have difficulty ambulating and uses a cane  The remainder of the ten-point ROS, including general, skin, lymph, H/N, cardiorespiratory, GI, , Neuro, Endocrine, and psychiatric is negative.     Objective:      Physical Exam      She is alert, oriented to time, place, person, pleasant, well      nourished, in no acute physical distress.    She is unable to climb in the examination table and  she was examined in the sitting position.                                   VITAL SIGNS:  Reviewed                                      HEENT:  Normal.  There are no nasal, oral, lip, gingival, auricular, lid,    or conjunctival lesions.  Mucosae are moist and pink, and there is no        thrush.  Pupils are equal, reactive to light and accommodation.              Extraocular muscle movements are intact.  Dentition is good.  There is no frontal or maxillary tenderness.                                     NECK:  Supple without JVD, adenopathy, or thyromegaly.                       LUNGS:  Clear to auscultation without wheezing, rales, or rhonchi.           CARDIOVASCULAR:  Reveals an S1, S2, no murmurs, no rubs, no gallops.         ABDOMEN:  Soft, nontender, without organomegaly.  Bowel sounds are    present.                                                                     EXTREMITIES:  No cyanosis, clubbing, or edema.   She has a club right foot and her right upper extremity is somewhat contracted.  She uses a cane for ambulation                              BREASTS:  She is status post right lumpectomy with a well-healed incision at the 9 o'clock position.  There is a right axillary sentinel lymph node biopsy incision which is also well healed.     There are no masses in either breast.    There are peau d'orange changes within the radiation field on the right breast.                               LYMPHATIC:  There is no cervical, axillary, or supraclavicular adenopathy.   SKIN:  Warm and moist, without petechiae, rashes, induration, or ecchymoses.           NEUROLOGIC:  DTRs are 0-1+ bilaterally, symmetrical, motor function is 5/5,  and cranial nerves are  within normal limits.      Assessment:       1. Invasive ductal carcinoma of breast, female, right    2. History of encephalitis, with significant residual neurological and motor deficits.       3,    Adjuvant use of anastrozole  Plan:         I had a long  discussion with her.  She will remain on anastrozole which she will take through the end of November 2025.  I will see her again in late August with a mammogram.  Her questions were answered to her satisfaction.

## 2022-08-29 ENCOUNTER — OFFICE VISIT (OUTPATIENT)
Dept: HEMATOLOGY/ONCOLOGY | Facility: CLINIC | Age: 72
End: 2022-08-29
Payer: MEDICARE

## 2022-08-29 ENCOUNTER — HOSPITAL ENCOUNTER (OUTPATIENT)
Dept: RADIOLOGY | Facility: HOSPITAL | Age: 72
Discharge: HOME | End: 2022-08-29
Attending: INTERNAL MEDICINE
Payer: MEDICARE

## 2022-08-29 VITALS
BODY MASS INDEX: 37.18 KG/M2 | OXYGEN SATURATION: 95 % | WEIGHT: 203.25 LBS | SYSTOLIC BLOOD PRESSURE: 172 MMHG | TEMPERATURE: 98 F | RESPIRATION RATE: 18 BRPM | DIASTOLIC BLOOD PRESSURE: 86 MMHG | HEART RATE: 92 BPM

## 2022-08-29 DIAGNOSIS — Z79.811 PROPHYLACTIC USE OF ANASTROZOLE (ARIMIDEX): ICD-10-CM

## 2022-08-29 DIAGNOSIS — C50.211 MALIGNANT NEOPLASM OF UPPER-INNER QUADRANT OF RIGHT BREAST IN FEMALE, ESTROGEN RECEPTOR POSITIVE: Primary | ICD-10-CM

## 2022-08-29 DIAGNOSIS — C50.211 MALIGNANT NEOPLASM OF UPPER-INNER QUADRANT OF RIGHT BREAST IN FEMALE, ESTROGEN RECEPTOR POSITIVE: ICD-10-CM

## 2022-08-29 DIAGNOSIS — Z17.0 MALIGNANT NEOPLASM OF UPPER-INNER QUADRANT OF RIGHT BREAST IN FEMALE, ESTROGEN RECEPTOR POSITIVE: Primary | ICD-10-CM

## 2022-08-29 DIAGNOSIS — Z17.0 MALIGNANT NEOPLASM OF UPPER-INNER QUADRANT OF RIGHT BREAST IN FEMALE, ESTROGEN RECEPTOR POSITIVE: ICD-10-CM

## 2022-08-29 PROCEDURE — 77066 MAMMO DIGITAL DIAGNOSTIC BILAT WITH TOMO: ICD-10-PCS | Mod: 26,,, | Performed by: RADIOLOGY

## 2022-08-29 PROCEDURE — 99213 PR OFFICE/OUTPT VISIT, EST, LEVL III, 20-29 MIN: ICD-10-PCS | Mod: S$PBB,,, | Performed by: INTERNAL MEDICINE

## 2022-08-29 PROCEDURE — 77062 BREAST TOMOSYNTHESIS BI: CPT | Mod: 26,,, | Performed by: RADIOLOGY

## 2022-08-29 PROCEDURE — 99213 OFFICE O/P EST LOW 20 MIN: CPT | Mod: PBBFAC | Performed by: INTERNAL MEDICINE

## 2022-08-29 PROCEDURE — 77066 DX MAMMO INCL CAD BI: CPT | Mod: TC

## 2022-08-29 PROCEDURE — 77066 DX MAMMO INCL CAD BI: CPT | Mod: 26,,, | Performed by: RADIOLOGY

## 2022-08-29 PROCEDURE — 99999 PR PBB SHADOW E&M-EST. PATIENT-LVL III: CPT | Mod: PBBFAC,,, | Performed by: INTERNAL MEDICINE

## 2022-08-29 PROCEDURE — 99999 PR PBB SHADOW E&M-EST. PATIENT-LVL III: ICD-10-PCS | Mod: PBBFAC,,, | Performed by: INTERNAL MEDICINE

## 2022-08-29 PROCEDURE — 77062 MAMMO DIGITAL DIAGNOSTIC BILAT WITH TOMO: ICD-10-PCS | Mod: 26,,, | Performed by: RADIOLOGY

## 2022-08-29 PROCEDURE — 99213 OFFICE O/P EST LOW 20 MIN: CPT | Mod: S$PBB,,, | Performed by: INTERNAL MEDICINE

## 2022-08-29 NOTE — PROGRESS NOTES
Subjective:       Patient ID: Julissa Singletary is a 71 y.o. female.    Chief Complaint: No chief complaint on file.    HPI Mrs Singletary returns today for follow-up.  I had last seen her in June 2022.  As of early December 2020 she has been on anastrozole in the adjuvant setting.   Of note, her Oncotype score was 2, suggestive of a 97% disease-free survival at 9 years with AIs.    Briefly, she  is a 71-year-old postmenopausal female who was diagnosed in 2020 with an infiltrating ductal carcinoma that was ER strongly positive, [place] strongly positive, and HER2 negative by FISH.  On 09/04/2020 she underwent a lumpectomy and sentinel lymph node biopsy.  She had a grade 1 carcinoma that measured 12 mm in greatest diameter.  Two sentinel lymph nodes were negative.  Resection margins were clear.  Her Oncotype score was 2.    A mammogram earlier today was read as BIRADS II, and a one year follow up was recommended.    Review of Systems    Overall she feels well. She has tolerated the anastrozole well, and has not experienced any side effects.  Her only complaints are chronic, and have to do with her prior history of encephalitis.  She is experiencing right-sided weakness, and she is unable to climb on the examination table.   She denies any anxiety, depression, easy bruising, fevers, chills, weight loss, nausea, vomiting, diarrhea, constipation, diplopia,  blurred vision, headache, chest pain, palpitations, shortness of breath, breast pain, or abdominal pain, but she does have difficulty ambulating and uses a cane  The remainder of the ten-point ROS, including general, skin, lymph, H/N, cardiorespiratory, GI, , Neuro, Endocrine, and psychiatric is negative.     Objective:      Physical Exam      She is alert, oriented to time, place, person, pleasant, well      nourished, in no acute physical distress.    She is unable to climb in the examination table and she was examined in the sitting position.                                    VITAL SIGNS:  Reviewed                                      HEENT:  Normal.  There are no nasal, oral, lip, gingival, auricular, lid,    or conjunctival lesions.  Mucosae are moist and pink, and there is no        thrush.  Pupils are equal, reactive to light and accommodation.              Extraocular muscle movements are intact.  Dentition is good.  There is no frontal or maxillary tenderness.                                     NECK:  Supple without JVD, adenopathy, or thyromegaly.                       LUNGS:  Clear to auscultation without wheezing, rales, or rhonchi.           CARDIOVASCULAR:  Reveals an S1, S2, no murmurs, no rubs, no gallops.         ABDOMEN:  Soft, nontender, without organomegaly.  Bowel sounds are    present.                                                                     EXTREMITIES:  No cyanosis, clubbing, or edema.   She has a club right foot and her right upper extremity is somewhat contracted.  She uses a cane for ambulation                              BREASTS:  She is status post right lumpectomy with a well-healed incision at the 9 o'clock position.  There is a right axillary sentinel lymph node biopsy incision which is also well healed.     There are no masses in either breast.    There are peau d'orange changes within the radiation field on the right breast.                               LYMPHATIC:  There is no cervical, axillary, or supraclavicular adenopathy.   SKIN:  Warm and moist, without petechiae, rashes, induration, or ecchymoses.           NEUROLOGIC:  DTRs are 0-1+ bilaterally, symmetrical, motor function is 5/5,  and cranial nerves are  within normal limits.      Assessment:       1. Invasive ductal carcinoma of breast, female, right    2. History of encephalitis, with significant residual neurological and motor deficits.       3,    Adjuvant use of anastrozole  Plan:         I had a long discussion with her.  She will remain on anastrozole which she will  take through the end of November 2025.  I will see her again in 6 months.  Her mammogram will be repeated a year from now.  Her questions were answered to her satisfaction.    Route Chart for Scheduling    Med Onc Chart Routing      Follow up with physician 6 months.   Follow up with SELAM    Infusion scheduling note    Injection scheduling note    Labs    Imaging    Pharmacy appointment    Other referrals

## 2023-01-26 DIAGNOSIS — Z78.0 MENOPAUSE: ICD-10-CM

## 2023-02-15 ENCOUNTER — TELEPHONE (OUTPATIENT)
Dept: HEMATOLOGY/ONCOLOGY | Facility: CLINIC | Age: 73
End: 2023-02-15
Payer: MEDICARE

## 2023-02-15 NOTE — TELEPHONE ENCOUNTER
"----- Message from Virginia Morris RN sent at 2/15/2023  2:45 PM CST -----    ----- Message -----  From: Alvarado Mcgee  Sent: 2/15/2023  12:26 PM CST  To: [facility] Staff    Scheduling Request        Patient Status: Est      Scheduling Appt: 6 month recall      Time/Date Preference: After 2/26      Contact Preference?: [phone]       Treating Provider: Petr      Do you feel you need to be seen today? No      Additional Notes:  "Thank you for all that you do for our patients"       "

## 2023-03-06 ENCOUNTER — OFFICE VISIT (OUTPATIENT)
Dept: HEMATOLOGY/ONCOLOGY | Facility: CLINIC | Age: 73
End: 2023-03-06
Payer: MEDICARE

## 2023-03-06 VITALS
OXYGEN SATURATION: 97 % | TEMPERATURE: 98 F | HEART RATE: 79 BPM | BODY MASS INDEX: 38.22 KG/M2 | DIASTOLIC BLOOD PRESSURE: 78 MMHG | HEIGHT: 62 IN | RESPIRATION RATE: 18 BRPM | SYSTOLIC BLOOD PRESSURE: 150 MMHG | WEIGHT: 207.69 LBS

## 2023-03-06 DIAGNOSIS — Z17.0 MALIGNANT NEOPLASM OF UPPER-INNER QUADRANT OF RIGHT BREAST IN FEMALE, ESTROGEN RECEPTOR POSITIVE: Primary | ICD-10-CM

## 2023-03-06 DIAGNOSIS — Z79.811 PROPHYLACTIC USE OF ANASTROZOLE (ARIMIDEX): ICD-10-CM

## 2023-03-06 DIAGNOSIS — Z12.31 ENCOUNTER FOR SCREENING MAMMOGRAM FOR MALIGNANT NEOPLASM OF BREAST: ICD-10-CM

## 2023-03-06 DIAGNOSIS — C50.211 MALIGNANT NEOPLASM OF UPPER-INNER QUADRANT OF RIGHT BREAST IN FEMALE, ESTROGEN RECEPTOR POSITIVE: Primary | ICD-10-CM

## 2023-03-06 PROCEDURE — 99999 PR PBB SHADOW E&M-EST. PATIENT-LVL III: ICD-10-PCS | Mod: PBBFAC,,, | Performed by: INTERNAL MEDICINE

## 2023-03-06 PROCEDURE — 99999 PR PBB SHADOW E&M-EST. PATIENT-LVL III: CPT | Mod: PBBFAC,,, | Performed by: INTERNAL MEDICINE

## 2023-03-06 PROCEDURE — 99213 OFFICE O/P EST LOW 20 MIN: CPT | Mod: S$PBB,,, | Performed by: INTERNAL MEDICINE

## 2023-03-06 PROCEDURE — 99213 OFFICE O/P EST LOW 20 MIN: CPT | Mod: PBBFAC | Performed by: INTERNAL MEDICINE

## 2023-03-06 PROCEDURE — 99213 PR OFFICE/OUTPT VISIT, EST, LEVL III, 20-29 MIN: ICD-10-PCS | Mod: S$PBB,,, | Performed by: INTERNAL MEDICINE

## 2023-03-06 NOTE — PROGRESS NOTES
Subjective:       Patient ID: Julissa Singletary is a 72 y.o. female.    Chief Complaint: No chief complaint on file.      HPI Mrs Singletary returns today for follow-up.  I had last seen her in August 2022.  As of early December 2020 she has been on anastrozole in the adjuvant setting.   Of note, her Oncotype score was 2, suggestive of a 97% disease-free survival at 9 years with AIs.    Briefly, she  is a 71-year-old postmenopausal female who was diagnosed in 2020 with an infiltrating ductal carcinoma that was ER strongly positive, [place] strongly positive, and HER2 negative by FISH.  On 09/04/2020 she underwent a lumpectomy and sentinel lymph node biopsy.  She had a grade 1 carcinoma that measured 12 mm in greatest diameter.  Two sentinel lymph nodes were negative.  Resection margins were clear.  Her Oncotype score was 2.    A mammogram last August was read as BIRADS II, and a one year follow up was recommended.    Review of Systems    Overall she feels well. She has tolerated the anastrozole well, and has not experienced any side effects.  Her only complaints are chronic, and have to do with her prior history of encephalitis.  She is experiencing right-sided weakness, and she is unable to climb on the examination table.  She was examined on the chair at the sitting position.   She denies any anxiety, depression, easy bruising, fevers, chills, weight loss, nausea, vomiting, diarrhea, constipation, diplopia,  blurred vision, headache, chest pain, palpitations, shortness of breath, breast pain, or abdominal pain, but she does have difficulty ambulating and uses a cane  The remainder of the ten-point ROS, including general, skin, lymph, H/N, cardiorespiratory, GI, , Neuro, Endocrine, and psychiatric is negative.     Objective:      Physical Exam      She is alert, oriented to time, place, person, pleasant, well      nourished, in no acute physical distress.    She is unable to climb in the examination table and she  was examined in the sitting position.                                   VITAL SIGNS:  Reviewed                                      HEENT:  Normal.  There are no nasal, oral, lip, gingival, auricular, lid,    or conjunctival lesions.  Mucosae are moist and pink, and there is no        thrush.  Pupils are equal, reactive to light and accommodation.              Extraocular muscle movements are intact.  Dentition is good.  There is no frontal or maxillary tenderness.                                     NECK:  Supple without JVD, adenopathy, or thyromegaly.                       LUNGS:  Clear to auscultation without wheezing, rales, or rhonchi.           CARDIOVASCULAR:  Reveals an S1, S2, no murmurs, no rubs, no gallops.         ABDOMEN:  Soft, nontender, without organomegaly.  Bowel sounds are    present.                                                                     EXTREMITIES:  No cyanosis, clubbing, or edema.   She has a club right foot and her right upper extremity is somewhat contracted.  She uses a cane for ambulation                              BREASTS:  She is status post right lumpectomy with a well-healed incision at the 9 o'clock position.  There is a right axillary sentinel lymph node biopsy incision which is also well healed.     There are no masses in either breast.    There are peau d'orange changes within the radiation field on the right breast.   This was noted previously and appears unchanged                            LYMPHATIC:  There is no cervical, axillary, or supraclavicular adenopathy.   SKIN:  Warm and moist, without petechiae, rashes, induration, or ecchymoses.           NEUROLOGIC:  DTRs are 0-1+ bilaterally, symmetrical, motor function is 5/5,  and cranial nerves are  within normal limits.      Assessment:       1. Invasive ductal carcinoma of breast, female, right    2. History of encephalitis, with significant residual neurological and motor deficits.       3,    Adjuvant use of  anastrozole  Plan:         I had a long discussion with her.  She will remain on anastrozole which she will take through the end of November 2025.  I will see her again in 6 months with her yearly mammogram.  She also needs a DXA scan, which has been ordered already by her PCP.  Her questions were answered to her satisfaction.    Route Chart for Scheduling    Med Onc Chart Routing      Follow up with physician Other. See me with a mammogram in late August   Follow up with SELAM    Infusion scheduling note    Injection scheduling note    Labs    Imaging Mammogram      Pharmacy appointment    Other referrals

## 2023-05-09 ENCOUNTER — TELEPHONE (OUTPATIENT)
Dept: PRIMARY CARE CLINIC | Facility: CLINIC | Age: 73
End: 2023-05-09
Payer: MEDICARE

## 2023-05-09 NOTE — TELEPHONE ENCOUNTER
----- Message from Andrea Ahn sent at 5/9/2023  8:55 AM CDT -----  type: Lab    Caller is requesting to schedule their Lab appointment prior to annual appointment.  Order is not listed in EPIC.  Please enter order and contact patient to schedule.    Thank you

## 2023-05-30 ENCOUNTER — OFFICE VISIT (OUTPATIENT)
Dept: PRIMARY CARE CLINIC | Facility: CLINIC | Age: 73
End: 2023-05-30
Payer: MEDICARE

## 2023-05-30 VITALS
OXYGEN SATURATION: 97 % | WEIGHT: 210 LBS | TEMPERATURE: 97 F | DIASTOLIC BLOOD PRESSURE: 80 MMHG | SYSTOLIC BLOOD PRESSURE: 130 MMHG | RESPIRATION RATE: 18 BRPM | HEIGHT: 62 IN | HEART RATE: 60 BPM | BODY MASS INDEX: 38.64 KG/M2

## 2023-05-30 DIAGNOSIS — G89.29 CHRONIC LEFT SHOULDER PAIN: Primary | ICD-10-CM

## 2023-05-30 DIAGNOSIS — G81.01 FLACCID HEMIPLEGIA OF RIGHT DOMINANT SIDE DUE TO NONCEREBROVASCULAR ETIOLOGY: ICD-10-CM

## 2023-05-30 DIAGNOSIS — M25.512 CHRONIC LEFT SHOULDER PAIN: Primary | ICD-10-CM

## 2023-05-30 DIAGNOSIS — M25.511 ACUTE PAIN OF RIGHT SHOULDER: Primary | ICD-10-CM

## 2023-05-30 PROBLEM — G04.90 ENCEPHALITIS: Status: RESOLVED | Noted: 2020-10-19 | Resolved: 2023-05-30

## 2023-05-30 PROBLEM — J32.9 SINUSITIS: Status: RESOLVED | Noted: 2021-11-09 | Resolved: 2023-05-30

## 2023-05-30 PROCEDURE — 99999 PR PBB SHADOW E&M-EST. PATIENT-LVL IV: ICD-10-PCS | Mod: PBBFAC,,, | Performed by: FAMILY MEDICINE

## 2023-05-30 PROCEDURE — 99999 PR PBB SHADOW E&M-EST. PATIENT-LVL IV: CPT | Mod: PBBFAC,,, | Performed by: FAMILY MEDICINE

## 2023-05-30 PROCEDURE — 99214 OFFICE O/P EST MOD 30 MIN: CPT | Mod: PBBFAC,PN | Performed by: FAMILY MEDICINE

## 2023-05-30 PROCEDURE — 99214 OFFICE O/P EST MOD 30 MIN: CPT | Mod: S$PBB,,, | Performed by: FAMILY MEDICINE

## 2023-05-30 PROCEDURE — 99214 PR OFFICE/OUTPT VISIT, EST, LEVL IV, 30-39 MIN: ICD-10-PCS | Mod: S$PBB,,, | Performed by: FAMILY MEDICINE

## 2023-05-30 RX ORDER — TIZANIDINE 4 MG/1
4 TABLET ORAL 3 TIMES DAILY PRN
Qty: 30 TABLET | Refills: 1 | Status: SHIPPED | OUTPATIENT
Start: 2023-05-30

## 2023-05-30 RX ORDER — FUROSEMIDE 20 MG/1
20 TABLET ORAL
COMMUNITY
Start: 2023-04-07

## 2023-05-30 NOTE — PROGRESS NOTES
"Subjective:       Patient ID: Julissa Singletary is a 72 y.o. female.    Chief Complaint: Annual Exam    Cough  This is a new problem. The current episode started in the past 7 days. The problem has been unchanged. The problem occurs every few minutes. The cough is Productive of sputum. Associated symptoms include shortness of breath (chronic) and wheezing. Pertinent negatives include no fever, hemoptysis or sweats. The symptoms are aggravated by lying down. She has tried a beta-agonist inhaler for the symptoms. The treatment provided mild relief. Her past medical history is significant for bronchitis.   Shoulder Pain   The pain is present in the left shoulder. The current episode started more than 1 month ago. There has been no history of extremity trauma. The problem occurs daily. The problem has been gradually worsening. The quality of the pain is described as aching. Associated symptoms include a limited range of motion. Pertinent negatives include no fever or joint locking. The symptoms are aggravated by activity. She has tried nothing for the symptoms.   Review of Systems   Constitutional:  Negative for fever.   Respiratory:  Positive for cough, shortness of breath (chronic) and wheezing. Negative for hemoptysis.    Musculoskeletal:  Positive for arthralgias.   Neurological:  Positive for weakness.     Objective:      Vitals:    05/30/23 1049   BP: 130/80   BP Location: Left arm   Patient Position: Sitting   BP Method: Large (Manual)   Pulse: 60   Resp: 18   Temp: 97.4 °F (36.3 °C)   TempSrc: Temporal   SpO2: 97%   Weight: 95.2 kg (209 lb 15.8 oz)   Height: 5' 2" (1.575 m)     Physical Exam  Vitals and nursing note reviewed.   Constitutional:       General: She is not in acute distress.     Appearance: Normal appearance. She is well-developed.   HENT:      Head: Normocephalic and atraumatic.   Cardiovascular:      Rate and Rhythm: Normal rate and regular rhythm.      Heart sounds: Normal heart sounds. "   Pulmonary:      Effort: Pulmonary effort is normal.      Breath sounds: Examination of the right-middle field reveals wheezing. Examination of the left-middle field reveals wheezing. Examination of the right-lower field reveals wheezing. Examination of the left-lower field reveals wheezing. Wheezing present.   Musculoskeletal:      Right shoulder: Tenderness present. No effusion. Decreased range of motion. Decreased strength.      Left shoulder: No effusion, bony tenderness or crepitus. Normal range of motion. Normal strength.      Right lower leg: No edema.      Left lower leg: No edema.   Skin:     General: Skin is warm and dry.   Neurological:      Mental Status: She is alert and oriented to person, place, and time.   Psychiatric:         Mood and Affect: Mood normal.         Behavior: Behavior normal.       Lab Results   Component Value Date    WBC 6.00 03/18/2022    HGB 12.9 03/18/2022    HCT 39.8 03/18/2022     03/18/2022    CHOL 195 02/13/2023    TRIG 136 02/13/2023    HDL 64 02/13/2023    ALT 19 03/18/2022    AST 20 03/18/2022     03/18/2022    K 3.8 03/18/2022     03/18/2022    CREATININE 0.8 03/18/2022    BUN 15 03/18/2022    CO2 26 03/18/2022    TSH 4.1 (H) 12/06/2004    INR 1.0 12/15/2004      Assessment:       1. Chronic left shoulder pain    2. Flaccid hemiplegia of right dominant side due to noncerebrovascular etiology        Plan:       Chronic left shoulder pain  -     X-Ray Shoulder 2 or More Views Left; Future; Expected date: 05/30/2023  -     tiZANidine (ZANAFLEX) 4 MG tablet; Take 1 tablet (4 mg total) by mouth 3 (three) times daily as needed (muscle spasm).  Dispense: 30 tablet; Refill: 1  -     Ambulatory referral/consult to Orthopedics; Future; Expected date: 06/06/2023    Flaccid hemiplegia of right dominant side due to noncerebrovascular etiology      Medication List with Changes/Refills   New Medications    TIZANIDINE (ZANAFLEX) 4 MG TABLET    Take 1 tablet (4 mg  total) by mouth 3 (three) times daily as needed (muscle spasm).   Current Medications    ALBUTEROL (VENTOLIN HFA) 90 MCG/ACTUATION INHALER    Inhale 2 puffs into the lungs every 4 (four) hours as needed for Wheezing or Shortness of Breath. Rescue    ALENDRONATE (FOSAMAX) 70 MG TABLET    Take 1 tablet (70 mg total) by mouth every 7 days.    ANASTROZOLE (ARIMIDEX) 1 MG TAB    TAKE ONE TABLET BY MOUTH ONCE DAILY    ATORVASTATIN (LIPITOR) 20 MG TABLET    TAKE ONE TABLET BY MOUTH ONCE DAILY    CALCIUM CARBONATE (OS-BEBE) 600 MG CALCIUM (1,500 MG) TAB    Take 600 mg by mouth once.    CARVEDILOL (COREG) 6.25 MG TABLET    TAKE 1 TABLET (6.25 MG TOTAL) BY MOUTH 2 (TWO) TIMES A DAY.    ESCITALOPRAM OXALATE (LEXAPRO) 20 MG TABLET    TAKE 1 TABLET (20 MG TOTAL) BY MOUTH ONCE DAILY.    FUROSEMIDE (LASIX) 20 MG TABLET    Take 20 mg by mouth.    MELATONIN 5 MG TAB    Take 1 tablet by mouth nightly as needed.    TURMERIC ORAL    Take 1 tablet by mouth once daily.    VALSARTAN-HYDROCHLOROTHIAZIDE (DIOVAN-HCT) 320-25 MG PER TABLET    TAKE 1 TABLET BY MOUTH ONCE DAILY.   Discontinued Medications    NYSTATIN (MYCOSTATIN) OINTMENT    Apply topically 2 (two) times daily as needed (rash).

## 2023-06-08 RX ORDER — ATORVASTATIN CALCIUM 20 MG/1
TABLET, FILM COATED ORAL
Qty: 90 TABLET | Refills: 3 | Status: SHIPPED | OUTPATIENT
Start: 2023-06-08

## 2023-06-08 NOTE — TELEPHONE ENCOUNTER
Care Due:                  Date            Visit Type   Department     Provider  --------------------------------------------------------------------------------                                EP -                              PRIMARY      [facility]  Last Visit: 05-      CARE ([facility])   PRIMARY CARE   Dean Lowery  Next Visit: None Scheduled  None         None Found                                                            Last  Test          Frequency    Reason                     Performed    Due Date  --------------------------------------------------------------------------------    CMP.........  12 months..  atorvastatin,              03- 03-                             valsartan-hydrochlorothia                             margiede.....................    Lipid Panel.  12 months..  atorvastatin.............  03- 03-    [facility] Embedded Care Due Messages. Reference number: [id number].   6/08/2023 1:00:35 PM CDT

## 2023-06-08 NOTE — TELEPHONE ENCOUNTER
Refill Routing Note   Medication(s) are not appropriate for processing by [facility] for the following reason(s):      Required labs outdated    [facility] action(s):  Defer Labs due          Appointments  past 12m or future 3m with PCP    Date Provider   Last Visit   5/30/2023 Dean Lowery MD   Next Visit   Visit date not found Dean Lowery MD   ED visits in past 90 days: 0        Note composed:2:44 PM 06/08/2023

## 2023-06-13 DIAGNOSIS — M81.0 OSTEOPOROSIS WITHOUT CURRENT PATHOLOGICAL FRACTURE, UNSPECIFIED OSTEOPOROSIS TYPE: ICD-10-CM

## 2023-06-13 RX ORDER — ALENDRONATE SODIUM 70 MG/1
70 TABLET ORAL
Qty: 12 TABLET | Refills: 0 | Status: SHIPPED | OUTPATIENT
Start: 2023-06-13 | End: 2023-10-24

## 2023-06-13 NOTE — TELEPHONE ENCOUNTER
Refill Routing Note   Medication(s) are not appropriate for processing by [facility] for the following reason(s):      Medication outside of protocol    [facility] action(s):  Route None identified          Appointments  past 12m or future 3m with PCP    Date Provider   Last Visit   5/30/2023 Dean Lowery MD   Next Visit   Visit date not found Dean Lowery MD   ED visits in past 90 days: 0        Note composed:10:01 AM 06/13/2023

## 2023-06-14 ENCOUNTER — HOSPITAL ENCOUNTER (OUTPATIENT)
Dept: RADIOLOGY | Facility: HOSPITAL | Age: 73
Discharge: HOME | End: 2023-06-14
Attending: INTERNAL MEDICINE
Payer: MEDICARE

## 2023-06-14 DIAGNOSIS — Z12.31 ENCOUNTER FOR SCREENING MAMMOGRAM FOR MALIGNANT NEOPLASM OF BREAST: ICD-10-CM

## 2023-06-14 DIAGNOSIS — Z17.0 MALIGNANT NEOPLASM OF UPPER-INNER QUADRANT OF RIGHT BREAST IN FEMALE, ESTROGEN RECEPTOR POSITIVE: ICD-10-CM

## 2023-06-14 DIAGNOSIS — Z79.811 PROPHYLACTIC USE OF ANASTROZOLE (ARIMIDEX): ICD-10-CM

## 2023-06-14 DIAGNOSIS — C50.211 MALIGNANT NEOPLASM OF UPPER-INNER QUADRANT OF RIGHT BREAST IN FEMALE, ESTROGEN RECEPTOR POSITIVE: ICD-10-CM

## 2023-06-14 PROCEDURE — 77067 SCR MAMMO BI INCL CAD: CPT | Mod: 26,,, | Performed by: RADIOLOGY

## 2023-06-14 PROCEDURE — 77067 MAMMO DIGITAL SCREENING BILAT WITH TOMO: ICD-10-PCS | Mod: 26,,, | Performed by: RADIOLOGY

## 2023-06-14 PROCEDURE — 77067 SCR MAMMO BI INCL CAD: CPT | Mod: TC

## 2023-06-14 PROCEDURE — 77063 MAMMO DIGITAL SCREENING BILAT WITH TOMO: ICD-10-PCS | Mod: 26,,, | Performed by: RADIOLOGY

## 2023-06-14 PROCEDURE — 77063 BREAST TOMOSYNTHESIS BI: CPT | Mod: 26,,, | Performed by: RADIOLOGY

## 2023-06-16 ENCOUNTER — TELEPHONE (OUTPATIENT)
Dept: PRIMARY CARE CLINIC | Facility: CLINIC | Age: 73
End: 2023-06-16
Payer: MEDICARE

## 2023-06-16 NOTE — TELEPHONE ENCOUNTER
----- Message from Crissy Tannerthomas sent at 6/16/2023 11:07 AM CDT -----  Contact: [phone]  2TESTRESULTS    Type: Test Results    What test was performed? X-Ray Cervical Spine AP And Lateral [IMG56]    Who ordered the test?KARLA BENJAMIN    When and where were the test performed? 06/08/2023 [facility]    Would you like response via [employer]: call     Comments:

## 2023-06-16 NOTE — TELEPHONE ENCOUNTER
Called pt regarding message. Informed pt of x ray results arthritis noted on x-ray.  No acutely worrisome findings.

## 2023-06-22 ENCOUNTER — OFFICE VISIT (OUTPATIENT)
Dept: ORTHOPEDICS | Facility: CLINIC | Age: 73
End: 2023-06-22
Payer: MEDICARE

## 2023-06-22 VITALS — BODY MASS INDEX: 38.33 KG/M2 | WEIGHT: 208.31 LBS | HEIGHT: 62 IN

## 2023-06-22 VITALS
BODY MASS INDEX: 38.23 KG/M2 | SYSTOLIC BLOOD PRESSURE: 105 MMHG | HEART RATE: 69 BPM | WEIGHT: 209 LBS | DIASTOLIC BLOOD PRESSURE: 57 MMHG

## 2023-06-22 DIAGNOSIS — M54.2 CHRONIC NECK PAIN: Primary | ICD-10-CM

## 2023-06-22 DIAGNOSIS — G89.29 CHRONIC NECK PAIN: Primary | ICD-10-CM

## 2023-06-22 DIAGNOSIS — M25.511 CHRONIC RIGHT SHOULDER PAIN: ICD-10-CM

## 2023-06-22 DIAGNOSIS — M54.2 CERVICALGIA: Primary | ICD-10-CM

## 2023-06-22 DIAGNOSIS — M25.512 CHRONIC LEFT SHOULDER PAIN: ICD-10-CM

## 2023-06-22 DIAGNOSIS — C50.211 MALIGNANT NEOPLASM OF UPPER-INNER QUADRANT OF RIGHT BREAST IN FEMALE, ESTROGEN RECEPTOR POSITIVE: ICD-10-CM

## 2023-06-22 DIAGNOSIS — M54.12 CERVICAL RADICULOPATHY: ICD-10-CM

## 2023-06-22 DIAGNOSIS — Z17.0 MALIGNANT NEOPLASM OF UPPER-INNER QUADRANT OF RIGHT BREAST IN FEMALE, ESTROGEN RECEPTOR POSITIVE: ICD-10-CM

## 2023-06-22 DIAGNOSIS — G89.29 CHRONIC RIGHT SHOULDER PAIN: ICD-10-CM

## 2023-06-22 DIAGNOSIS — G89.29 CHRONIC LEFT SHOULDER PAIN: ICD-10-CM

## 2023-06-22 PROCEDURE — 99203 OFFICE O/P NEW LOW 30 MIN: CPT | Mod: S$PBB,25,,

## 2023-06-22 PROCEDURE — 20610 LARGE JOINT ASPIRATION/INJECTION: R SUBACROMIAL BURSA: ICD-10-PCS | Mod: S$PBB,RT,,

## 2023-06-22 PROCEDURE — 99999 PR PBB SHADOW E&M-EST. PATIENT-LVL IV: CPT | Mod: PBBFAC,,,

## 2023-06-22 PROCEDURE — 99204 PR OFFICE/OUTPT VISIT, NEW, LEVL IV, 45-59 MIN: ICD-10-PCS | Mod: S$PBB,,, | Performed by: REGISTERED NURSE

## 2023-06-22 PROCEDURE — 20610 DRAIN/INJ JOINT/BURSA W/O US: CPT | Mod: PBBFAC,PN,RT

## 2023-06-22 PROCEDURE — 99999 PR PBB SHADOW E&M-EST. PATIENT-LVL III: ICD-10-PCS | Mod: PBBFAC,,, | Performed by: REGISTERED NURSE

## 2023-06-22 PROCEDURE — 99999 PR PBB SHADOW E&M-EST. PATIENT-LVL IV: ICD-10-PCS | Mod: PBBFAC,,,

## 2023-06-22 PROCEDURE — 99204 OFFICE O/P NEW MOD 45 MIN: CPT | Mod: S$PBB,,, | Performed by: REGISTERED NURSE

## 2023-06-22 PROCEDURE — 20610 DRAIN/INJ JOINT/BURSA W/O US: CPT | Mod: S$PBB,RT,,

## 2023-06-22 PROCEDURE — 99213 OFFICE O/P EST LOW 20 MIN: CPT | Mod: PBBFAC,27 | Performed by: REGISTERED NURSE

## 2023-06-22 PROCEDURE — 99214 OFFICE O/P EST MOD 30 MIN: CPT | Mod: PBBFAC,PN,25

## 2023-06-22 PROCEDURE — 99999 PR PBB SHADOW E&M-EST. PATIENT-LVL III: CPT | Mod: PBBFAC,,, | Performed by: REGISTERED NURSE

## 2023-06-22 PROCEDURE — 99203 PR OFFICE/OUTPT VISIT, NEW, LEVL III, 30-44 MIN: ICD-10-PCS | Mod: S$PBB,25,,

## 2023-06-22 RX ORDER — MELOXICAM 15 MG/1
15 TABLET ORAL DAILY
Qty: 30 TABLET | Refills: 0 | Status: SHIPPED | OUTPATIENT
Start: 2023-06-22 | End: 2024-01-02 | Stop reason: SDUPTHER

## 2023-06-22 RX ORDER — ANASTROZOLE 1 MG/1
1 TABLET ORAL DAILY
Qty: 90 TABLET | Refills: 3 | Status: SHIPPED | OUTPATIENT
Start: 2023-06-22

## 2023-06-22 RX ORDER — ANASTROZOLE 1 MG/1
1 TABLET ORAL DAILY
Qty: 90 TABLET | Refills: 3 | Status: CANCELLED | OUTPATIENT
Start: 2023-06-22

## 2023-06-22 RX ORDER — GABAPENTIN 300 MG/1
300 CAPSULE ORAL NIGHTLY
Qty: 30 CAPSULE | Refills: 11 | Status: SHIPPED | OUTPATIENT
Start: 2023-06-22 | End: 2024-06-21

## 2023-06-22 RX ORDER — TRIAMCINOLONE ACETONIDE 40 MG/ML
40 INJECTION, SUSPENSION INTRA-ARTICULAR; INTRAMUSCULAR
Status: COMPLETED | OUTPATIENT
Start: 2023-06-22 | End: 2023-06-22

## 2023-06-22 RX ADMIN — TRIAMCINOLONE ACETONIDE 40 MG: 40 INJECTION, SUSPENSION INTRA-ARTICULAR; INTRAMUSCULAR at 11:06

## 2023-06-22 RX ADMIN — TRIAMCINOLONE ACETONIDE 40 MG: 40 INJECTION, SUSPENSION INTRA-ARTICULAR; INTRAMUSCULAR at 10:06

## 2023-06-22 NOTE — PROGRESS NOTES
"Patient ID: Julissa Singletary is a 72 y.o. female    Pain of the Left Shoulder      History of Present Illness:    Julissa Singletary presents to clinic for chronic b/l neck and shoulder pain, R > L. 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 Reports her "whole right side hurts" Patient endorsing chronic neck and b/l shoulder pain, with pain radiating down her arms. Patient denies known KOLTON. The severe pain started 2 months ago and is becoming progressively worse.  Pain is located over (points to) right shoulder and neck. She reports that the pain is a 10 /10 sharp pain today. The pain is affecting ADLs and limiting desired level of activity. Denies numbness, tingling, radiation and inability to bear weight.    Trial of CBD cream, chiropractor and muscle relaxer with no improvement. She has done PT with no improvement.     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     Right Breast cancer     2020     PAST SURGICAL HISTORY:   Past Surgical History:   Procedure Laterality Date    BREAST BIOPSY Right 2021    BREAST LUMPECTOMY Right     2020    BREAST SURGERY Right     2021    HYSTERECTOMY      MASTECTOMY, PARTIAL Right 09/04/2020    Procedure: MASTECTOMY, PARTIAL RIGHT WITH REFLECTOR;  Surgeon: Mike Haro MD;  Location: [facility] OR;  Service: General;  Laterality: Right;    OOPHORECTOMY      SENTINEL LYMPH NODE BIOPSY Right 09/04/2020    Procedure: BIOPSY, LYMPH NODE, SENTINEL RIGHT;  Surgeon: Mike Haro MD;  Location: [facility] OR;  Service: General;  Laterality: Right;     FAMILY HISTORY:   Family History   Problem Relation Age of Onset    Diabetes Mother     Heart disease Mother     Heart disease Father     Heart attack Father     No Known Problems " Brother     No Known Problems Maternal Aunt     No Known Problems Maternal Uncle     No Known Problems Paternal Aunt     No Known Problems Paternal Uncle     No Known Problems Maternal Grandmother     No Known Problems Maternal Grandfather     No Known Problems Paternal Grandmother     No Known Problems Paternal Grandfather     No Known Problems Brother     No Known Problems Daughter     No Known Problems Son     Amblyopia Neg Hx     Blindness Neg Hx     Cancer Neg Hx     Cataracts Neg Hx     Glaucoma Neg Hx     Hypertension Neg Hx     Macular degeneration Neg Hx     Retinal detachment Neg Hx     Strabismus Neg Hx     Stroke Neg Hx     Thyroid disease Neg Hx     Ovarian cancer Neg Hx     Colon cancer Neg Hx      SOCIAL HISTORY:   Social History     Occupational History    Not on file   Tobacco Use    Smoking status: Never    Smokeless tobacco: Never   Substance and Sexual Activity    Alcohol use: Yes     Comment: 3-4 glasses of wine on Sundays, 1 pint of Lucio Beam w/coke  during week     Drug use: No    Sexual activity: Not Currently        MEDICATIONS:   Current Outpatient Medications:     albuterol (VENTOLIN HFA) 90 mcg/actuation inhaler, Inhale 2 puffs into the lungs every 4 (four) hours as needed for Wheezing or Shortness of Breath. Rescue, Disp: 18 g, Rfl: 1    alendronate (FOSAMAX) 70 MG tablet, Take 1 tablet (70 mg total) by mouth every 7 days., Disp: 12 tablet, Rfl: 0    anastrozole (ARIMIDEX) 1 mg Tab, TAKE ONE TABLET BY MOUTH ONCE DAILY, Disp: 90 tablet, Rfl: 3    atorvastatin (LIPITOR) 20 MG tablet, Take one tablet by mouth once daily, Disp: 90 tablet, Rfl: 3    calcium carbonate (OS-BEBE) 600 mg calcium (1,500 mg) Tab, Take 600 mg by mouth once., Disp: , Rfl:     carvediloL (COREG) 6.25 MG tablet, TAKE 1 TABLET (6.25 MG TOTAL) BY MOUTH 2 (TWO) TIMES A DAY., Disp: 180 tablet, Rfl: 3    EScitalopram oxalate (LEXAPRO) 20 MG tablet, TAKE 1 TABLET (20 MG TOTAL) BY MOUTH ONCE DAILY., Disp: 90 tablet, Rfl: 0     furosemide (LASIX) 20 MG tablet, Take 20 mg by mouth., Disp: , Rfl:     melatonin 5 mg Tab, Take 1 tablet by mouth nightly as needed., Disp: , Rfl:     tiZANidine (ZANAFLEX) 4 MG tablet, Take 1 tablet (4 mg total) by mouth 3 (three) times daily as needed (muscle spasm)., Disp: 30 tablet, Rfl: 1    TURMERIC ORAL, Take 1 tablet by mouth once daily., Disp: , Rfl:     valsartan-hydrochlorothiazide (DIOVAN-HCT) 320-25 mg per tablet, TAKE 1 TABLET BY MOUTH ONCE DAILY., Disp: 90 tablet, Rfl: 3  ALLERGIES: Review of patient's allergies indicates:  No Known Allergies      Physical Exam     There were no vitals filed for this visit.  Alert and oriented to person, place and time. No acute distress. Well-groomed, not ill appearing. Pupils round and reactive, normal respiratory effort, no audible wheezing.     Shoulder / Upper Extremity Exam    Right shoulder:  There is global tenderness to palpation of her right shoulder.  There is pain with passive range of motion past 80.  She is able to minimally flex and extend her wrist secondary to paralysis.  There are no infectious signs.  There is pain with cervical flexion and extension.  There is tenderness to palpation along the cervical spine.        Imaging:     Bilateral shoulder X-rays ordered/reviewed by me showing no evidence of fracture or dislocation. There is no obvious malalignment. No evidence of masses, lesions or foreign bodies.  Mild DJD.    Assessment & Plan    Chronic left shoulder pain  -     Ambulatory referral/consult to Orthopedics         I made the decision to obtain old records of the patient including previous notes and imaging. New imaging was ordered today of the extremity or extremities evaluated. I independently reviewed and interpreted the radiographs and/or MRIs/CT scan today as well as prior imaging.    We discussed at length different treatment options including conservative vs surgical management. These include anti-inflammatories, acetaminophen,  rest, ice, heat, formal physical therapy including strengthening and stretching exercises, home exercise programs, injections, dry needling, and finally surgical intervention.      We will proceed today with a trial of right shoulder CSI.  Right shoulder CSI given, post-injection instructions reviewed.  I do believe most of her pain is secondary to the paralysis versus her neck.  Referral to Back and Spine placed.  Follow up as needed.    Follow up: prn  X-rays next visit: none    All questions were answered and patient is agreeable to the above plan.

## 2023-06-22 NOTE — PROCEDURES
Large Joint Aspiration/Injection: R subacromial bursa    Date/Time: 6/22/2023 10:00 AM  Performed by: Christi Dyson PA-C  Authorized by: Christi Dyson PA-C     Consent Done?:  Yes (Verbal)  Indications:  Pain and arthritis  Site marked: the procedure site was marked    Timeout: prior to procedure the correct patient, procedure, and site was verified    Prep: patient was prepped and draped in usual sterile fashion    Local anesthetic:  Topical anesthetic    Details:  Needle Size:  22 G  Ultrasonic Guidance for needle placement?: No    Approach:  Posterior  Location:  Shoulder  Site:  R subacromial bursa  Medications:  40 mg Triamcinolone acetonide 40 mg/ml (subac)  Patient tolerance:  Patient tolerated the procedure well with no immediate complications

## 2023-06-22 NOTE — TELEPHONE ENCOUNTER
"Returned call to pt. Pt needs anastrozole refilled. Message and pended refill sent to Dr. Humphreys for approval.     ----- Message from Sangita Busby sent at 6/22/2023  9:25 AM CDT -----  Regarding: Pt advice  Contact: Pt     Pt requesting call back in regards to meds she needs to take for 5 yrs.   Please call and adv @       Confirmed contact below:   Contact Name: Julissa Singletary  Phone Number: [phone]               Additional Notes:  "Thank you for all that you do for our patients"                                           "

## 2023-06-22 NOTE — PROGRESS NOTES
DATE: 6/22/2023  PATIENT: Julissa Singletary    Supervising Physician: Rodrigo Whiting M.D.    CHIEF COMPLAINT: neck pain    HISTORY:  Julissa Singletary is a 72 y.o. female with pmhx of osteoporosis and encephalitis at age 6 that left her with right upper and lower extremity weakness and numbness here for initial evaluation of neck and bilateral (R>L) arm pain (Neck - 4, Arm - 8). The pain has been present for about 3 months. The patient describes the pain as sharp. The pain is worse with any movement and improved by nothing. There is associated numbness and tingling. There is subjective weakness. Prior treatments have included CBD oil, but no CHADWICK or surgery.     The patient denies myelopathic symptoms such as handwriting changes or difficulty with buttons/coins/keys. Denies perineal paresthesias, bowel/bladder dysfunction.    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     Right Breast cancer     2020     Past Surgical History:   Procedure Laterality Date    BREAST BIOPSY Right 2021    BREAST LUMPECTOMY Right     2020    BREAST SURGERY Right     2021    HYSTERECTOMY      MASTECTOMY, PARTIAL Right 09/04/2020    Procedure: MASTECTOMY, PARTIAL RIGHT WITH REFLECTOR;  Surgeon: Mike Haro MD;  Location: [facility] OR;  Service: General;  Laterality: Right;    OOPHORECTOMY      SENTINEL LYMPH NODE BIOPSY Right 09/04/2020    Procedure: BIOPSY, LYMPH NODE, SENTINEL RIGHT;  Surgeon: Mike Haro MD;  Location: [facility] OR;  Service: General;  Laterality: Right;       Medications:  Current Outpatient Medications on File Prior to Visit   Medication Sig Dispense Refill    albuterol (VENTOLIN HFA) 90 mcg/actuation inhaler Inhale 2 puffs into the lungs every 4 (four) hours as needed for Wheezing or Shortness of Breath. Rescue 18 g 1    alendronate (FOSAMAX) 70 MG tablet Take 1 tablet (70  mg total) by mouth every 7 days. 12 tablet 0    anastrozole (ARIMIDEX) 1 mg Tab Take 1 tablet (1 mg total) by mouth once daily. 90 tablet 3    atorvastatin (LIPITOR) 20 MG tablet Take one tablet by mouth once daily 90 tablet 3    calcium carbonate (OS-BEBE) 600 mg calcium (1,500 mg) Tab Take 600 mg by mouth once.      carvediloL (COREG) 6.25 MG tablet TAKE 1 TABLET (6.25 MG TOTAL) BY MOUTH 2 (TWO) TIMES A DAY. 180 tablet 3    EScitalopram oxalate (LEXAPRO) 20 MG tablet TAKE 1 TABLET (20 MG TOTAL) BY MOUTH ONCE DAILY. 90 tablet 0    furosemide (LASIX) 20 MG tablet Take 20 mg by mouth.      melatonin 5 mg Tab Take 1 tablet by mouth nightly as needed.      tiZANidine (ZANAFLEX) 4 MG tablet Take 1 tablet (4 mg total) by mouth 3 (three) times daily as needed (muscle spasm). 30 tablet 1    TURMERIC ORAL Take 1 tablet by mouth once daily.      valsartan-hydrochlorothiazide (DIOVAN-HCT) 320-25 mg per tablet TAKE 1 TABLET BY MOUTH ONCE DAILY. 90 tablet 3    [DISCONTINUED] anastrozole (ARIMIDEX) 1 mg Tab TAKE ONE TABLET BY MOUTH ONCE DAILY 90 tablet 3     Current Facility-Administered Medications on File Prior to Visit   Medication Dose Route Frequency Provider Last Rate Last Admin    [COMPLETED] triamcinolone acetonide injection 40 mg  40 mg INTRABURSAL 1 time in Clinic/HOD Christi Dyson PA-C   40 mg at 06/22/23 1101       Social History:   Social History     Socioeconomic History    Marital status:    Tobacco Use    Smoking status: Never    Smokeless tobacco: Never   Substance and Sexual Activity    Alcohol use: Yes     Comment: 3-4 glasses of wine on Sundays, 1 pint of Lucio Beam w/coke  during week     Drug use: No    Sexual activity: Not Currently       REVIEW OF SYSTEMS:  Constitution: Negative. Negative for chills, fever and night sweats.   Cardiovascular: Negative for chest pain and syncope.   Respiratory: Negative for cough and shortness of breath.   Gastrointestinal: See HPI. Negative for  "nausea/vomiting. Negative for abdominal pain.  Genitourinary: See HPI. Negative for discoloration or dysuria.  Skin: Negative for dry skin, itching and rash.   Hematologic/Lymphatic: Negative for bleeding problem. Does not bruise/bleed easily.   Musculoskeletal: Negative for falls and muscle weakness.   Neurological: See HPI. No seizures.   Endocrine: Negative for polydipsia, polyphagia and polyuria.   Allergic/Immunologic: Negative for hives and persistent infections.  Psychiatric/Behavioral: Negative for depression and insomnia.         EXAM:  Ht 5' 2" (1.575 m)   Wt 94.5 kg (208 lb 5.4 oz)   LMP  (LMP Unknown)   BMI 38.10 kg/m²     General: The patient is a very pleasant 72 y.o. female in no apparent distress, the patient is oriented to person, place and time.  Psych: Normal mood and affect  HEENT: Vision grossly intact, hearing intact to the spoken word.  Lungs: Respirations unlabored.  Gait: antalgic station and gait, difficulty with toe or heel walk.   Skin: Cervical skin negative for rashes, lesions, hairy patches and surgical scars.  Range of motion: Cervical range of motion is acceptable. There is mild tenderness to palpation.  Spinal Balance: Global saggital and coronal spinal balance acceptable, no significant for scoliosis and kyphosis.  Musculoskeletal: No pain with the range of motion of the bilateral shoulders and elbows. Normal bulk and contour of the bilateral hands.  Vascular: Bilateral hands warm and well perfused, radial pulses 2+ bilaterally.  Neurological: decreased strength and tone in all major motor groups in the right upper and lower extremities. decreased sensation to light touch in the C5-T1 and L2-S1 dermatomes bilaterally.  Deep tendon reflexes symmetric 2+ in the bilateral upper and lower extremities.  Negative Inverted Radial Reflex and Lu's bilaterally. Negative Babinski bilaterally.     IMAGING:   Today I personally reviewed AP, Lat and Flex/Ex  upright C-spine films that " demonstrate mild DDD at C5/6.      Body mass index is 38.1 kg/m².    No results found for: HGBA1C        ASSESSMENT/PLAN:    Julissa was seen today for neck pain and shoulder pain.    Diagnoses and all orders for this visit:    Cervicalgia  -     MRI Cervical Spine Without Contrast; Future    Cervical radiculopathy  -     Ambulatory referral/consult to [facility]  -     MRI Cervical Spine Without Contrast; Future  -     gabapentin (NEURONTIN) 300 MG capsule; Take 1 capsule (300 mg total) by mouth every evening.  -     meloxicam (MOBIC) 15 MG tablet; Take 1 tablet (15 mg total) by mouth once daily.      Today we discussed at length all of the different treatment options including anti-inflammatories, acetaminophen, rest, ice, heat, physical therapy including strengthening and stretching exercises, home exercises, ROM, aerobic conditioning, aqua therapy, other modalities including ultrasound, massage, and dry needling, epidural steroid injections and finally surgical intervention.  Cervical MRI ordered, I will call with results and order an CHADWICK.     I have provided the patient with a home exercise program. It is the [employer] cervical exercise program. Exercises include walking erectly with neutral head position, supine neutral head position, supine retraction, sitting or standing neck retraction, posture training, forward/backward/sideward isometric strengthening, prone head lifts, supine head lifts, scapular retraction, and neck rotation. Pt will complete each exercise twice daily for 6-8 weeks.

## 2023-06-27 NOTE — PROGRESS NOTES
Established Patient - Audio Only Telehealth Visit     The patient location is: home  The chief complaint leading to consultation is: MRI results  Visit type: Virtual visit with audio only (telephone)  Total time spent with patient: 15min     The reason for the audio only service rather than synchronous audio and video virtual visit was related to technical difficulties or patient preference/necessity.     Each patient to whom I provide medical services by telemedicine is:  (1) informed of the relationship between the physician and patient and the respective role of any other health care provider with respect to management of the patient; and (2) notified that they may decline to receive medical services by telemedicine and may withdraw from such care at any time. Patient verbally consented to receive this service via voice-only telephone call.    DATE: 7/10/2023  PATIENT: Julissa Singletary    Attending Physician: Rodrigo Whiting M.D.    HISTORY:  Julissa Singletary is a 72 y.o. female who returns to me today for MRI results.  She was last seen by me 6/22/2023.  Today she is doing well but notes neck and bilateral (R>L) arm pain.    The Patient denies myelopathic symptoms such as handwriting changes or difficulty with buttons/coins/keys. Denies perineal paresthesias, bowel/bladder dysfunction.    PMH/PSH/FamHx/SocHx:  Unchanged from prior visit    ROS:  REVIEW OF SYSTEMS:  Constitution: Negative. Negative for chills, fever and night sweats.   HENT: Negative for congestion and headaches.    Eyes: Negative for blurred vision, left vision loss and right vision loss.   Cardiovascular: Negative for chest pain and syncope.   Respiratory: Negative for cough and shortness of breath.    Endocrine: Negative for polydipsia, polyphagia and polyuria.   Hematologic/Lymphatic: Negative for bleeding problem. Does not bruise/bleed easily.   Skin: Negative for dry skin, itching and rash.   Musculoskeletal: Negative for falls  and muscle weakness.   Gastrointestinal: Negative for abdominal pain and bowel incontinence.   Allergic/Immunologic: Negative for hives and persistent infections.  Genitourinary: Negative for urinary retention/incontinence and nocturia.   Neurological: negaitve for disturbances in coordination, no myelopathic symptoms such as handwriting changes or difficulty with buttons, coins, keys or small objects. No loss of balance and seizures.   Psychiatric/Behavioral: Negative for depression. The patient does not have insomnia.   Denies myelopathic symptoms, perineal paresthesias, bowel or bladder incontinence    EXAM:  LMP  (LMP Unknown)   Stable.     IMAGING:    Today I personally re-reviewed AP, Lat and Flex/Ex  upright C-spine films that demonstrate mild DDD at C5/6.      Cervical MRI shows moderate spinal canal stenosis and mild bilateral neural foraminal narrowing at C5-C6.    There is no height or weight on file to calculate BMI.    No results found for: HGBA1C      ASSESSMENT/PLAN:    Diagnoses and all orders for this visit:    Cervical radiculopathy  -     Procedure Order to Pain Management; Future      Today we discussed at length all of the different treatment options including anti-inflammatories, acetaminophen, rest, ice, heat, physical therapy including strengthening and stretching exercises, home exercises, ROM, aerobic conditioning, aqua therapy, other modalities including ultrasound, massage, and dry needling, epidural steroid injections and finally surgical intervention.  cervical CHADWICK ordered, she may follow up 2 weeks after if her pain persists.     I had a sit down discussion with the patient regarding cervical myelopathy. I discussed the known stepwise degeneration of cervical myelopathy. I discussed the risks and benefits of decompressive surgery, including the concept that surgery would be done to prevent further neurological injury/degeneration rather than to ameliorate any existing deficits.       This service was not originating from a related E/M service provided within the previous 7 days nor will  to an E/M service or procedure within the next 24 hours or my soonest available appointment.  Prevailing standard of care was able to be met in this audio-only visit.

## 2023-07-07 ENCOUNTER — HOSPITAL ENCOUNTER (OUTPATIENT)
Dept: RADIOLOGY | Facility: HOSPITAL | Age: 73
Discharge: HOME | End: 2023-07-07
Attending: REGISTERED NURSE
Payer: MEDICARE

## 2023-07-07 DIAGNOSIS — M54.12 CERVICAL RADICULOPATHY: ICD-10-CM

## 2023-07-07 DIAGNOSIS — M54.2 CERVICALGIA: ICD-10-CM

## 2023-07-07 PROCEDURE — 72141 MRI NECK SPINE W/O DYE: CPT | Mod: TC

## 2023-07-07 PROCEDURE — 72141 MRI NECK SPINE W/O DYE: CPT | Mod: 26,,, | Performed by: RADIOLOGY

## 2023-07-07 PROCEDURE — 72141 MRI CERVICAL SPINE WITHOUT CONTRAST: ICD-10-PCS | Mod: 26,,, | Performed by: RADIOLOGY

## 2023-07-10 ENCOUNTER — OFFICE VISIT (OUTPATIENT)
Dept: ORTHOPEDICS | Facility: CLINIC | Age: 73
End: 2023-07-10
Payer: MEDICARE

## 2023-07-10 DIAGNOSIS — M54.12 CERVICAL RADICULOPATHY: Primary | ICD-10-CM

## 2023-07-10 PROCEDURE — 99442 PR PHYSICIAN TELEPHONE EVALUATION 11-20 MIN: ICD-10-PCS | Mod: 95,,, | Performed by: REGISTERED NURSE

## 2023-07-10 PROCEDURE — 99442 PR PHYSICIAN TELEPHONE EVALUATION 11-20 MIN: CPT | Mod: 95,,, | Performed by: REGISTERED NURSE

## 2023-07-11 ENCOUNTER — PATIENT OUTREACH (OUTPATIENT)
Dept: ADMINISTRATIVE | Facility: HOSPITAL | Age: 73
End: 2023-07-11
Payer: MEDICARE

## 2023-07-11 ENCOUNTER — TELEPHONE (OUTPATIENT)
Dept: HEMATOLOGY/ONCOLOGY | Facility: CLINIC | Age: 73
End: 2023-07-11
Payer: MEDICARE

## 2023-07-11 NOTE — TELEPHONE ENCOUNTER
----- Message from Rita Tamayo sent at 7/11/2023  3:09 PM CDT -----  Type:  Appointment Request    Name of Caller:JEISON MCALLISTER [[id number]]  When is the first available appointment?No access  Symptoms:reschedule  Would the patient rather a call back or a response via [facility]? Call back  Best Call Back Number:[phone]  Additional Information: Patient indicates she is unable to make her appointment on 8/21 with provider. Patient would like to reschedule for the following week 8/28-9/1. Patient would like to see provider as soon as possible. Please call patient back with further assistance and additional information.

## 2023-07-11 NOTE — PROGRESS NOTES
Health Maintenance Due   Topic Date Due    Shingles Vaccine (2 of 2) 10/11/2019    COVID-19 Vaccine (5 - Pfizer series) 02/13/2023    Colorectal Cancer Screening  10/03/2023     Upcoming on 10/3/2023, patient will be due for colorectal cancer screening.     Immunizations - reviewed and updated   Care Everywhere - triggered   Care Teams - updated   Outreach - MSSP report reviewed. Letter mailed to patient in regards to scheduling annual exam with provider. Last annual exam was done 3/17/2022.

## 2023-07-11 NOTE — LETTER
July 11, 2023            Julissa Shields [address]  [address] [address]       Dear Ms. Singletary,      Our records indicate that you are overdue for an annual checkup. Our records show your last annual exam was done on 3/17/2022. Currently, Dean Lowery MD is listed as your primary care provider. If this is incorrect, please notify your primary care clinic so we can update your health record.    Your [facility] care team is committed to supporting your overall health. We look forward to seeing you soon and appreciate the opportunity to serve you.    If you would like to schedule your appointment, please contact your primary care clinic.    Health Maintenance Due   Topic Date Due    Shingles Vaccine (2 of 2) 10/11/2019    COVID-19 Vaccine (5 - Pfizer series) 02/13/2023    Colorectal Cancer Screening  10/03/2023         Sincerely,     Dean Lowery MD and your [facility] Primary Care Team

## 2023-07-12 ENCOUNTER — TELEPHONE (OUTPATIENT)
Dept: ORTHOPEDICS | Facility: CLINIC | Age: 73
End: 2023-07-12
Payer: MEDICARE

## 2023-07-12 DIAGNOSIS — M54.12 CERVICAL RADICULOPATHY: Primary | ICD-10-CM

## 2023-07-12 NOTE — TELEPHONE ENCOUNTER
Attempt to contact patient regarding an appointment. Left message for patient to return call back to [phone]. Thanks.

## 2023-07-12 NOTE — TELEPHONE ENCOUNTER
Spoke to patient in regards to an appointment. Patient stated that she would like to see someone for an injection. Stated to patient that she have a order in the system and that someone from [facility] will be contacting her to get scheduled. As I was telling  this to the patient, patient had done hung up the phone. Thanks.

## 2023-07-25 ENCOUNTER — TELEPHONE (OUTPATIENT)
Dept: PAIN MEDICINE | Facility: CLINIC | Age: 73
End: 2023-07-25
Payer: MEDICARE

## 2023-07-25 NOTE — TELEPHONE ENCOUNTER
----- Message from Arthur Henriquez sent at 7/25/2023  4:20 PM CDT -----  Contact: [phone]  [email or web address]     Patient is calling for Medical Advice regarding: pt has questions about upcoming appt on 8/3. She wants to know how long she will be out for that.     How long has patient had these symptoms:    Pharmacy name and phone#:    Would like response via [facility]:  no    Comments:

## 2023-07-27 ENCOUNTER — OFFICE VISIT (OUTPATIENT)
Dept: HEMATOLOGY/ONCOLOGY | Facility: CLINIC | Age: 73
End: 2023-07-27
Payer: MEDICARE

## 2023-07-27 VITALS
RESPIRATION RATE: 18 BRPM | HEIGHT: 62 IN | TEMPERATURE: 98 F | HEART RATE: 61 BPM | WEIGHT: 211.19 LBS | SYSTOLIC BLOOD PRESSURE: 175 MMHG | BODY MASS INDEX: 38.86 KG/M2 | OXYGEN SATURATION: 97 % | DIASTOLIC BLOOD PRESSURE: 84 MMHG

## 2023-07-27 DIAGNOSIS — Z79.811 AROMATASE INHIBITOR USE: ICD-10-CM

## 2023-07-27 DIAGNOSIS — C50.211 MALIGNANT NEOPLASM OF UPPER-INNER QUADRANT OF RIGHT BREAST IN FEMALE, ESTROGEN RECEPTOR POSITIVE: Primary | ICD-10-CM

## 2023-07-27 DIAGNOSIS — Z12.31 ENCOUNTER FOR SCREENING MAMMOGRAM FOR MALIGNANT NEOPLASM OF BREAST: ICD-10-CM

## 2023-07-27 DIAGNOSIS — M81.0 OSTEOPOROSIS, UNSPECIFIED OSTEOPOROSIS TYPE, UNSPECIFIED PATHOLOGICAL FRACTURE PRESENCE: ICD-10-CM

## 2023-07-27 DIAGNOSIS — Z86.61 HISTORY OF ENCEPHALITIS: ICD-10-CM

## 2023-07-27 DIAGNOSIS — L30.4 INTERTRIGO: ICD-10-CM

## 2023-07-27 DIAGNOSIS — Z17.0 MALIGNANT NEOPLASM OF UPPER-INNER QUADRANT OF RIGHT BREAST IN FEMALE, ESTROGEN RECEPTOR POSITIVE: Primary | ICD-10-CM

## 2023-07-27 PROCEDURE — 99999 PR PBB SHADOW E&M-EST. PATIENT-LVL IV: ICD-10-PCS | Mod: PBBFAC,,, | Performed by: NURSE PRACTITIONER

## 2023-07-27 PROCEDURE — 99999 PR PBB SHADOW E&M-EST. PATIENT-LVL IV: CPT | Mod: PBBFAC,,, | Performed by: NURSE PRACTITIONER

## 2023-07-27 PROCEDURE — 99214 OFFICE O/P EST MOD 30 MIN: CPT | Mod: S$PBB,,, | Performed by: NURSE PRACTITIONER

## 2023-07-27 PROCEDURE — 99214 OFFICE O/P EST MOD 30 MIN: CPT | Mod: PBBFAC | Performed by: NURSE PRACTITIONER

## 2023-07-27 PROCEDURE — 99214 PR OFFICE/OUTPT VISIT, EST, LEVL IV, 30-39 MIN: ICD-10-PCS | Mod: S$PBB,,, | Performed by: NURSE PRACTITIONER

## 2023-07-27 RX ORDER — NYSTATIN 100000 [USP'U]/G
POWDER TOPICAL 3 TIMES DAILY
Qty: 60 G | Refills: 1 | Status: SHIPPED | OUTPATIENT
Start: 2023-07-27

## 2023-07-27 NOTE — PROGRESS NOTES
Subjective:       Patient ID: Julissa Singletary is a 72 y.o. female.    Chief Complaint: No chief complaint on file.      HPI Mrs Singletary returns today for follow-up.  I had last seen her in August 2022.  As of early December 2020 she has been on anastrozole in the adjuvant setting.   Of note, her Oncotype score was 2, suggestive of a 97% disease-free survival at 9 years with AIs.    Per Dr. Albrecht's last note:   Briefly, she  is a 71-year-old postmenopausal female who was diagnosed in 2020 with an infiltrating ductal carcinoma that was ER strongly positive, CT strongly positive, and HER2 negative by FISH.  On 09/04/2020 she underwent a lumpectomy and sentinel lymph node biopsy.  She had a grade 1 carcinoma that measured 12 mm in greatest diameter.  Two sentinel lymph nodes were negative.  Resection margins were clear.  Her Oncotype score was 2.    A mammogram  June 2023 was read as BIRADS II, and a one year follow up was recommended.    Review of Systems      Overall she feels well. She has tolerated the anastrozole well, and has not experienced any side effects.  Her only complaints are chronic, and have to do with her prior history of encephalitis.  She is experiencing right-sided weakness, and she is unable to climb on the examination table.  She was examined on the chair at the sitting position.   Has a pinched nerve in her neck causing increased right arm pain/weakness, seeing pain management for this.  Mood overall stable, lost  in December.   Appetite good.  Gaining weight because eating out more now, normal bowel movements.  Takes fosomax and calcium and vitamin D.  She denies any anxiety, easy bruising, fevers, chills, weight loss, nausea, vomiting, diarrhea, constipation, diplopia,  blurred vision, headache, chest pain, palpitations, shortness of breath, breast pain, or abdominal pain, but she does have difficulty ambulating and uses a cane  The remainder of the ten-point ROS, including  general, skin, lymph, H/N, cardiorespiratory, GI, , Neuro, Endocrine, and psychiatric is negative.     Objective:      Physical Exam      She is alert, oriented to time, place, person, pleasant, well      nourished, in no acute physical distress.    She is unable to climb in the examination table and she was examined in the sitting position.                                   VITAL SIGNS:  Reviewed                                      HEENT:  Normal.  There are no nasal, oral, lip, gingival, auricular, lid,    or conjunctival lesions.  Mucosae are moist and pink, and there is no        thrush.  Pupils are equal, reactive to light and accommodation.              Extraocular muscle movements are intact.  Dentition is good.  There is no frontal or maxillary tenderness.                                     NECK:  Supple without JVD, adenopathy, or thyromegaly.                       LUNGS:  Clear to auscultation without wheezing, rales, or rhonchi.           CARDIOVASCULAR:  Reveals an S1, S2, no murmurs, no rubs, no gallops.         ABDOMEN:  Soft, nontender, without organomegaly.  Bowel sounds are    present.                                                                     EXTREMITIES:  No cyanosis, clubbing, or edema.   She has a club right foot and her right upper extremity is somewhat contracted.  She uses a cane for ambulation                              BREASTS:  She is status post right lumpectomy with a well-healed incision at the 9 o'clock position.  There is a right axillary sentinel lymph node biopsy incision which is also well healed.     There are no masses in either breast.    There are peau d'orange changes within the radiation field on the right breast.   This was noted previously and appears unchanged                            LYMPHATIC:  There is no cervical, axillary, or supraclavicular adenopathy.   SKIN:  Warm and moist, without petechiae, rashes, induration, or ecchymoses.            NEUROLOGIC:  DTRs are 0-1+ bilaterally, symmetrical, motor function is 5/5,  and cranial nerves are  within normal limits.      Assessment:       Julissa was seen today for malignant neoplasm of upper-inner quadrant of right breast .    Diagnoses and all orders for this visit:    Malignant neoplasm of upper-inner quadrant of right breast in female, estrogen receptor positive    Aromatase inhibitor use    Osteoporosis, unspecified osteoporosis type, unspecified pathological fracture presence    History of encephalitis      Plan:         I had a long discussion with her.  She will remain on anastrozole which she will take through the end of November 2025.  She will see Dr. Humphreys again in 6 months and have her yearly mammogram in June 2024  DXA from 6/2023 shows stable osteoporosis, continue fosomax and ca/vit d  Her questions were answered to her satisfaction.    Route Chart for Scheduling    Med Onc Chart Routing      Follow up with physician 6 months. with Dr. Humphreys   Follow up with S[facility]    Infusion scheduling note    Injection scheduling note    Labs    Imaging Mammogram   June 2024   Pharmacy appointment    Other referrals          Total time of this visit, including time spent face to face with patient and/or via video/audio, and also in preparing for today's visit for MDM and documentation. (Medical Decision Making, including consideration of possible diagnoses, management options, complex medical record review, review of diagnostic tests and information, consideration and discussion of significant complications based on comorbidities, and discussion with providers involved with the care of the patient) is 30 minutes. Greater than 50% was spent face to face with the patient counseling and coordinating care.    Marsha Pelletier NP

## 2023-07-31 ENCOUNTER — TELEPHONE (OUTPATIENT)
Dept: PAIN MEDICINE | Facility: CLINIC | Age: 73
End: 2023-07-31
Payer: MEDICARE

## 2023-07-31 NOTE — TELEPHONE ENCOUNTER
----- Message from Itz Woods sent at 7/31/2023 11:21 AM CDT -----  Contact: Patient  Type:  Patient Call          Who Called: patient         Does the patient know what this is regarding?: Requesting a call back pt said that she need to know which building and floor her procedure will be on ;; please advise         Would the patient rather a call back or a response via [facility]?call           Best Call Back Number: [phone] (Addis)              Additional Information:

## 2023-07-31 NOTE — TELEPHONE ENCOUNTER
Staff spoke with the patient patient was directed to follow up with the procedure scheduling department to get the requested information that was needed.

## 2023-08-03 ENCOUNTER — HOSPITAL ENCOUNTER (OUTPATIENT)
Facility: OTHER | Age: 73
Discharge: HOME | End: 2023-08-03
Attending: ANESTHESIOLOGY | Admitting: ANESTHESIOLOGY
Payer: MEDICARE

## 2023-08-03 VITALS
TEMPERATURE: 98 F | BODY MASS INDEX: 38.64 KG/M2 | WEIGHT: 210 LBS | HEIGHT: 62 IN | OXYGEN SATURATION: 95 % | HEART RATE: 60 BPM | RESPIRATION RATE: 18 BRPM | DIASTOLIC BLOOD PRESSURE: 62 MMHG | SYSTOLIC BLOOD PRESSURE: 135 MMHG

## 2023-08-03 DIAGNOSIS — G89.29 CHRONIC PAIN: ICD-10-CM

## 2023-08-03 DIAGNOSIS — M50.30 DDD (DEGENERATIVE DISC DISEASE), CERVICAL: ICD-10-CM

## 2023-08-03 DIAGNOSIS — M54.12 CERVICAL RADICULOPATHY: Primary | ICD-10-CM

## 2023-08-03 PROCEDURE — 25000003 PHARM REV CODE 250: Performed by: ANESTHESIOLOGY

## 2023-08-03 PROCEDURE — 64479 PR INJECT ANES/STEROID FORAMEN CERV/THORACIC W IMG GUIDE ,1 LEVEL: ICD-10-PCS | Mod: RT,,, | Performed by: ANESTHESIOLOGY

## 2023-08-03 PROCEDURE — 64479 NJX AA&/STRD TFRM EPI C/T 1: CPT | Mod: RT,,, | Performed by: ANESTHESIOLOGY

## 2023-08-03 PROCEDURE — 63600175 PHARM REV CODE 636 W HCPCS: Performed by: ANESTHESIOLOGY

## 2023-08-03 PROCEDURE — 64479 NJX AA&/STRD TFRM EPI C/T 1: CPT | Mod: RT | Performed by: ANESTHESIOLOGY

## 2023-08-03 RX ORDER — FENTANYL CITRATE 50 UG/ML
INJECTION, SOLUTION INTRAMUSCULAR; INTRAVENOUS
Status: DISCONTINUED | OUTPATIENT
Start: 2023-08-03 | End: 2023-08-03 | Stop reason: HOSPADM

## 2023-08-03 RX ORDER — LIDOCAINE HYDROCHLORIDE 10 MG/ML
INJECTION, SOLUTION EPIDURAL; INFILTRATION; INTRACAUDAL; PERINEURAL
Status: DISCONTINUED | OUTPATIENT
Start: 2023-08-03 | End: 2023-08-03 | Stop reason: HOSPADM

## 2023-08-03 RX ORDER — DEXAMETHASONE SODIUM PHOSPHATE 10 MG/ML
INJECTION INTRAMUSCULAR; INTRAVENOUS
Status: DISCONTINUED | OUTPATIENT
Start: 2023-08-03 | End: 2023-08-03 | Stop reason: HOSPADM

## 2023-08-03 RX ORDER — MIDAZOLAM HYDROCHLORIDE 1 MG/ML
INJECTION INTRAMUSCULAR; INTRAVENOUS
Status: DISCONTINUED | OUTPATIENT
Start: 2023-08-03 | End: 2023-08-03 | Stop reason: HOSPADM

## 2023-08-03 RX ORDER — SODIUM CHLORIDE 9 MG/ML
INJECTION, SOLUTION INTRAVENOUS CONTINUOUS
Status: DISCONTINUED | OUTPATIENT
Start: 2023-08-03 | End: 2023-08-03 | Stop reason: HOSPADM

## 2023-08-03 RX ORDER — LIDOCAINE HYDROCHLORIDE 20 MG/ML
INJECTION, SOLUTION INFILTRATION; PERINEURAL
Status: DISCONTINUED | OUTPATIENT
Start: 2023-08-03 | End: 2023-08-03 | Stop reason: HOSPADM

## 2023-08-03 NOTE — DISCHARGE SUMMARY
Discharge Note  Short Stay      SUMMARY     Admit Date: 8/3/2023    Attending Physician: Casandra Carballo MD      Discharge Physician: Mallika Giles      Discharge Date: 8/3/2023 11:23 AM    Procedure(s) (LRB):  CERVICAL TRANSFORAMINAL Right C5/6 DIRECT REFERRAL (Right)    Final Diagnosis: Cervical radiculopathy [M54.12]    Disposition: Home or self care    Patient Instructions:   Current Discharge Medication List        CONTINUE these medications which have NOT CHANGED    Details   carvediloL (COREG) 6.25 MG tablet TAKE 1 TABLET (6.25 MG TOTAL) BY MOUTH 2 (TWO) TIMES A DAY.  Qty: 180 tablet, Refills: 3    Associated Diagnoses: Essential hypertension      valsartan-hydrochlorothiazide (DIOVAN-HCT) 320-25 mg per tablet TAKE 1 TABLET BY MOUTH ONCE DAILY.  Qty: 90 tablet, Refills: 3    Associated Diagnoses: Essential hypertension      albuterol (VENTOLIN HFA) 90 mcg/actuation inhaler Inhale 2 puffs into the lungs every 4 (four) hours as needed for Wheezing or Shortness of Breath. Rescue  Qty: 18 g, Refills: 1      alendronate (FOSAMAX) 70 MG tablet Take 1 tablet (70 mg total) by mouth every 7 days.  Qty: 12 tablet, Refills: 0    Associated Diagnoses: Osteoporosis without current pathological fracture, unspecified osteoporosis type      anastrozole (ARIMIDEX) 1 mg Tab Take 1 tablet (1 mg total) by mouth once daily.  Qty: 90 tablet, Refills: 3    Associated Diagnoses: Malignant neoplasm of upper-inner quadrant of right breast in female, estrogen receptor positive      atorvastatin (LIPITOR) 20 MG tablet Take one tablet by mouth once daily  Qty: 90 tablet, Refills: 3      calcium carbonate (OS-BEBE) 600 mg calcium (1,500 mg) Tab Take 600 mg by mouth once.      EScitalopram oxalate (LEXAPRO) 20 MG tablet TAKE 1 TABLET (20 MG TOTAL) BY MOUTH ONCE DAILY.  Qty: 90 tablet, Refills: 0    Associated Diagnoses: OLI (generalized anxiety disorder)      furosemide (LASIX) 20 MG tablet Take 20 mg by mouth.      gabapentin (NEURONTIN) 300  MG capsule Take 1 capsule (300 mg total) by mouth every evening.  Qty: 30 capsule, Refills: 11    Associated Diagnoses: Cervical radiculopathy      melatonin 5 mg Tab Take 1 tablet by mouth nightly as needed.      meloxicam (MOBIC) 15 MG tablet Take 1 tablet (15 mg total) by mouth once daily.  Qty: 30 tablet, Refills: 0    Associated Diagnoses: Cervical radiculopathy      nystatin (MYCOSTATIN) powder Apply topically 3 (three) times daily. Apply under breasts to rash  Qty: 60 g, Refills: 1    Associated Diagnoses: Intertrigo      tiZANidine (ZANAFLEX) 4 MG tablet Take 1 tablet (4 mg total) by mouth 3 (three) times daily as needed (muscle spasm).  Qty: 30 tablet, Refills: 1    Associated Diagnoses: Chronic left shoulder pain      TURMERIC ORAL Take 1 tablet by mouth once daily.                 Discharge Diagnosis: Cervical radiculopathy [M54.12]  Condition on Discharge: Stable with no complications to procedure   Diet on Discharge: Same as before.  Activity: as per instruction sheet.  Discharge to: Home with a responsible adult.  Follow up: 2-4 weeks       Please call my office or pager at [phone] if experienced any weakness or loss of sensation, fever > 101.5, pain uncontrolled with oral medications, persistent nausea/vomiting/or diarrhea, redness or drainage from the incisions, or any other worrisome concerns. If physician on call was not reached or could not communicate with our office for any reason please go to the nearest emergency department

## 2023-08-03 NOTE — OP NOTE
Cervical Transforaminal Epidural Steroid Injection under Fluoroscopic Guidance    The procedure, risks, benefits, and options were discussed with the patient. There are no contraindications to the procedure. The patent expressed understanding and agreed to the procedure. Informed written consent was obtained prior to the start of the procedure and can be found in the patient's chart.    PATIENT NAME: Julissa Singletary   MRN: [id number]     DATE OF PROCEDURE: 08/03/2023    PROCEDURE: Right  C5/6 Cervical Transforaminal Epidural Steroid Injection under Fluoroscopic Guidance    PRE-OP DIAGNOSIS: Cervical radiculopathy [M54.12] Cervical radiculopathy [M54.12]    POST-OP DIAGNOSIS: Same    PHYSICIAN: Casandra Carballo MD    ASSISTANTS: Brain Cohen     MEDICATIONS INJECTED: Preservative-free Decadron 10mg with 1cc of Lidocaine 1% MPF     LOCAL ANESTHETIC INJECTED: Xylocaine 2%     SEDATION: Versed 2mg and Fentanyl 25mcg                                                                                                                                                                                     Conscious sedation ordered by M.D. Patient re-evaluation prior to administration of conscious sedation. No changes noted in patient's status from initial evaluation. The patient's vital signs were monitored by RN and patient remained hemodynamically stable throughout the procedure.    Event Time In   Sedation Start 1114   Sedation End 1119       ESTIMATED BLOOD LOSS: None    COMPLICATIONS: None    TECHNIQUE: Time-out was performed to identify the patient and procedure to be performed. With the patient laying in the oblique position, the surgical area was prepped and draped in the usual sterile fashion using ChloraPrep and a fenestrated drape. The levels were determined under fluoroscopy guidance. Skin anesthesia was achieved by injecting Lidocaine 2% over the injection sites. The transforaminal spaces were then approached with a 25  gauge, 3.5 inch spinal quinke needle that was introduced under fluoroscopic guidance with AP, lateral and/or contralateral oblique imaging. Once the needle tip was in the area of the transforaminal space, and there was no blood, CSF or paraesthesias, contrast dye Omnipaque (300mg/mL) was injected to confirm placement and there was no vascular runoff. Fluoroscopic imaging in the AP and lateral views revealed a clear outline of the spinal nerve with proximal spread of agent through the neural foramen into the epidural space. Then 2 mL of the medication mixture listed above was injected slowly at each site. Displacement of the radio opaque contrast after injection of the medication confirmed that the medication went into the area of the transforaminal spaces. The needles were removed and bleeding was nil. A sterile dressing was applied. No specimens collected. The patient tolerated the procedure well.     Pre procedure pain: 8/10  Post Procedure pain: 1/10    The patient was monitored after the procedure in the recovery area. They were given post-procedure and discharge instructions to follow at home. The patient was discharged in a stable condition.    Mallika Giles MD

## 2023-08-03 NOTE — DISCHARGE INSTRUCTIONS

## 2023-08-03 NOTE — H&P
"HPI  Patient presenting for Procedure(s) (LRB):  CERVICAL TRANSFORAMINAL RIGHT C5/6 DIRECT REFERRAL      Patient on Anti-coagulation No    No health changes since previous encounter w Ms Connolly. Symptoms mostly in RUE    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     Right Breast cancer     2020     Past Surgical History:   Procedure Laterality Date    BREAST BIOPSY Right 2021    BREAST LUMPECTOMY Right     2020    BREAST SURGERY Right     2021    HYSTERECTOMY      MASTECTOMY, PARTIAL Right 09/04/2020    Procedure: MASTECTOMY, PARTIAL RIGHT WITH REFLECTOR;  Surgeon: Mike Haro MD;  Location: [facility] OR;  Service: General;  Laterality: Right;    OOPHORECTOMY      SENTINEL LYMPH NODE BIOPSY Right 09/04/2020    Procedure: BIOPSY, LYMPH NODE, SENTINEL RIGHT;  Surgeon: Mike Haro MD;  Location: [facility] OR;  Service: General;  Laterality: Right;     Review of patient's allergies indicates:  No Known Allergies   Current Facility-Administered Medications   Medication    0.9%  NaCl infusion       PMHx, PSHx, Allergies, Medications reviewed in epic    ROS negative except pain complaints in HPI    OBJECTIVE:    /78 (BP Location: Right arm, Patient Position: Sitting)   Pulse 70   Temp 97.6 °F (36.4 °C) (Oral)   Resp 16   Ht 5' 2" (1.575 m)   Wt 95.3 kg (210 lb)   LMP  (LMP Unknown)   SpO2 (!) 92%   Breastfeeding No   BMI 38.41 kg/m²     PHYSICAL EXAMINATION:    GENERAL: Well appearing, in no acute distress, alert and oriented x3.  PSYCH:  Mood and affect appropriate.  SKIN: Skin color, texture, turgor normal, no rashes or lesions which will impact the procedure.  CV: RRR with palpation of the radial artery.  PULM: No evidence of respiratory difficulty, symmetric chest rise. Clear to auscultation.  NEURO: Cranial nerves grossly intact.    Plan:    Proceed with procedure as planned Procedure(s) " (LRB):  CERVICAL TRANSFORAMINAL RIGHT C5/6 DIRECT REFERRAL (RIGHT)    Chichi Cohen  08/03/2023

## 2023-08-08 ENCOUNTER — TELEPHONE (OUTPATIENT)
Dept: FAMILY MEDICINE | Facility: CLINIC | Age: 73
End: 2023-08-08
Payer: MEDICARE

## 2023-10-02 ENCOUNTER — TELEPHONE (OUTPATIENT)
Dept: PRIMARY CARE CLINIC | Facility: CLINIC | Age: 73
End: 2023-10-02
Payer: MEDICARE

## 2023-10-02 DIAGNOSIS — G89.29 CHRONIC LEFT SHOULDER PAIN: Primary | ICD-10-CM

## 2023-10-02 DIAGNOSIS — M25.512 CHRONIC LEFT SHOULDER PAIN: Primary | ICD-10-CM

## 2023-10-02 DIAGNOSIS — M25.511 ACUTE PAIN OF RIGHT SHOULDER: ICD-10-CM

## 2023-10-02 NOTE — TELEPHONE ENCOUNTER
Called pt regarding message. Pt stated she is still having arm pain. Pt is requesting Ortho referral.

## 2023-10-02 NOTE — TELEPHONE ENCOUNTER
----- Message from Dee Kaba sent at 10/2/2023 11:31 AM CDT -----  Contact: Patient, [phone]  [email or web address]     Patient is calling for Medical Advice regarding: Right arm won't move    How long has patient had these symptoms: A while    Pharmacy name and phone#:   [id number], [place] [id number] [place] [id number] [facility] [address]  Phone: [phone] Fax: [phone]       Would like response via [facility]:  Call back    Comments: Calling for an appointment, none available. Please call her. Thanks.

## 2023-10-04 ENCOUNTER — TELEPHONE (OUTPATIENT)
Dept: ADMINISTRATIVE | Facility: CLINIC | Age: 73
End: 2023-10-04
Payer: MEDICARE

## 2023-10-04 NOTE — TELEPHONE ENCOUNTER
----- Message from Milton Hansen MA sent at 8/8/2023  2:39 PM CDT -----  Regarding: Call back in October to schedule  Patient, requested a call back in October to be scheduled for the EAWV appointment.     Assessment/Plan:    No problem-specific Assessment & Plan notes found for this encounter  Diagnoses and all orders for this visit:    Viral illness    Pharyngitis, unspecified etiology  -     POCT rapid strepA  -     Throat culture    Other orders  -     albuterol (PROVENTIL HFA,VENTOLIN HFA) 90 mcg/act inhaler; 2 puffs via spacer every 4-6 hours as needed for cough or wheeze  -     diphenhydrAMINE (BENADRYL ALLERGY CHILDRENS) 12 5 MG chewable tablet; Chew  -     loratadine (CLARITIN REDITABS) 10 MG dissolvable tablet; Take 10 mg by mouth          Subjective:      Patient ID: Mica Delgado is a 12 y o  male  Saturday night c/o stomach pain, worsened overnight in to Sunday - no vomiting   C/o headaches, diarrhea - watery  Some nasal congestion   Poor appetite  Doing better today  Last diarrheal stool overnight  No blood noted  Drinking well  Had stomach pain overnight - resolved with stooling  Brother with recent fever, cough, diarrhea  More active today  Normal voiding          The following portions of the patient's history were reviewed and updated as appropriate: allergies, current medications, past family history, past social history, past surgical history and problem list     Review of Systems   Constitutional: Positive for appetite change  Negative for fever  HENT: Negative for congestion and sore throat  Eyes: Negative  Respiratory: Negative  Cardiovascular: Negative  Gastrointestinal: Positive for abdominal pain and diarrhea  Genitourinary: Negative  Objective:      BP (!) 94/58 (BP Location: Right arm, Patient Position: Sitting)   Temp (!) 99 7 °F (37 6 °C) (Tympanic)   Ht 3' 9 12" (1 146 m)   Wt 22 3 kg (49 lb 3 2 oz)   BMI 16 99 kg/m²          Physical Exam   Constitutional: He appears well-nourished  He is active  No distress     HENT:   Right Ear: Tympanic membrane normal    Left Ear: Tympanic membrane normal    Nose: Nose normal    Mouth/Throat: Mucous membranes are moist  Dentition is normal  Oropharynx is clear  Eyes: Conjunctivae are normal  Pupils are equal, round, and reactive to light  Neck: Normal range of motion  Neck supple  Shotty nontender cervical nodes   Cardiovascular: Normal rate, regular rhythm, S1 normal and S2 normal   Pulses are palpable  No murmur heard  Pulmonary/Chest: Effort normal and breath sounds normal  There is normal air entry  He has no wheezes  He has no rhonchi  He has no rales  Abdominal: Soft  Bowel sounds are normal  He exhibits no mass  There is no hepatosplenomegaly  There is no tenderness  There is no guarding  Neurological: He is alert  Skin: Skin is warm  No rash noted  Vitals reviewed

## 2023-10-04 NOTE — TELEPHONE ENCOUNTER
Spoke with the Patient and scheduled an EAWV appointment for 10/06/23 @ 1:00pm  Patient verbally understands.

## 2023-10-06 ENCOUNTER — OFFICE VISIT (OUTPATIENT)
Dept: INTERNAL MEDICINE | Facility: CLINIC | Age: 73
End: 2023-10-06
Payer: MEDICARE

## 2023-10-06 VITALS
BODY MASS INDEX: 38.91 KG/M2 | WEIGHT: 211.44 LBS | OXYGEN SATURATION: 98 % | HEART RATE: 70 BPM | DIASTOLIC BLOOD PRESSURE: 78 MMHG | SYSTOLIC BLOOD PRESSURE: 132 MMHG | HEIGHT: 62 IN

## 2023-10-06 DIAGNOSIS — R26.9 ABNORMALITY OF GAIT AND MOBILITY: ICD-10-CM

## 2023-10-06 DIAGNOSIS — Z99.89 DEPENDENCE ON OTHER ENABLING MACHINES AND DEVICES: ICD-10-CM

## 2023-10-06 DIAGNOSIS — F51.01 PRIMARY INSOMNIA: ICD-10-CM

## 2023-10-06 DIAGNOSIS — M21.371 ACQUIRED RIGHT FOOT DROP: ICD-10-CM

## 2023-10-06 DIAGNOSIS — M46.00 SPINAL ENTHESOPATHY: ICD-10-CM

## 2023-10-06 DIAGNOSIS — E66.01 SEVERE OBESITY (BMI 35.0-39.9) WITH COMORBIDITY: ICD-10-CM

## 2023-10-06 DIAGNOSIS — M81.0 OSTEOPOROSIS WITHOUT CURRENT PATHOLOGICAL FRACTURE, UNSPECIFIED OSTEOPOROSIS TYPE: ICD-10-CM

## 2023-10-06 DIAGNOSIS — R06.02 CHRONIC SHORTNESS OF BREATH: ICD-10-CM

## 2023-10-06 DIAGNOSIS — I10 ESSENTIAL HYPERTENSION: ICD-10-CM

## 2023-10-06 DIAGNOSIS — J45.30 MILD PERSISTENT ASTHMA, UNSPECIFIED WHETHER COMPLICATED: ICD-10-CM

## 2023-10-06 DIAGNOSIS — Z79.811 PROPHYLACTIC USE OF ANASTROZOLE (ARIMIDEX): ICD-10-CM

## 2023-10-06 DIAGNOSIS — G81.01 FLACCID HEMIPLEGIA OF RIGHT DOMINANT SIDE DUE TO NONCEREBROVASCULAR ETIOLOGY: ICD-10-CM

## 2023-10-06 DIAGNOSIS — F41.1 GAD (GENERALIZED ANXIETY DISORDER): ICD-10-CM

## 2023-10-06 DIAGNOSIS — C50.211 MALIGNANT NEOPLASM OF UPPER-INNER QUADRANT OF RIGHT BREAST IN FEMALE, ESTROGEN RECEPTOR POSITIVE: ICD-10-CM

## 2023-10-06 DIAGNOSIS — Z00.00 ENCOUNTER FOR PREVENTIVE HEALTH EXAMINATION: Primary | ICD-10-CM

## 2023-10-06 DIAGNOSIS — J40 BRONCHITIS: ICD-10-CM

## 2023-10-06 DIAGNOSIS — Z17.0 MALIGNANT NEOPLASM OF UPPER-INNER QUADRANT OF RIGHT BREAST IN FEMALE, ESTROGEN RECEPTOR POSITIVE: ICD-10-CM

## 2023-10-06 DIAGNOSIS — E78.5 HYPERLIPIDEMIA, UNSPECIFIED HYPERLIPIDEMIA TYPE: ICD-10-CM

## 2023-10-06 PROCEDURE — G0439 PR MEDICARE ANNUAL WELLNESS SUBSEQUENT VISIT: ICD-10-PCS | Mod: ,,, | Performed by: NURSE PRACTITIONER

## 2023-10-06 PROCEDURE — G0439 PPPS, SUBSEQ VISIT: HCPCS | Mod: ,,, | Performed by: NURSE PRACTITIONER

## 2023-10-06 PROCEDURE — 99999 PR PBB SHADOW E&M-EST. PATIENT-LVL IV: CPT | Mod: PBBFAC,,, | Performed by: NURSE PRACTITIONER

## 2023-10-06 PROCEDURE — 99214 OFFICE O/P EST MOD 30 MIN: CPT | Mod: PBBFAC | Performed by: NURSE PRACTITIONER

## 2023-10-06 PROCEDURE — 99999 PR PBB SHADOW E&M-EST. PATIENT-LVL IV: ICD-10-PCS | Mod: PBBFAC,,, | Performed by: NURSE PRACTITIONER

## 2023-10-06 NOTE — PROGRESS NOTES
"Julissa Singletary presented for a  Medicare AWV and comprehensive Health Risk Assessment today. The following components were reviewed and updated:    Medical history  Family History  Social history  Allergies and Current Medications  Health Risk Assessment  Health Maintenance  Care Team         ** See Completed Assessments for Annual Wellness Visit within the encounter summary.**         The following assessments were completed:  Living Situation  CAGE  Depression Screening  Timed Get Up and Go - Deferred, using cane, foot drop  Whisper Test  Cognitive Function Screening - unable to complete clock drawing, able to spell WORLD backwards.  Nutrition Screening  ADL Screening  PAQ Screening  Review for Opioid Screening: Pt does not have Rx for Opioids.  Review for Substance Use Disorders: Patient does not use substances.        Vitals:    10/06/23 1314   BP: 132/78   BP Location: Left arm   Pulse: 70   SpO2: 98%   Weight: 95.9 kg (211 lb 6.7 oz)   Height: 5' 2" (1.575 m)     Body mass index is 38.67 kg/m².    Physical Exam  Vitals reviewed.   Constitutional:       Appearance: Normal appearance. She is obese.   HENT:      Head: Normocephalic.   Cardiovascular:      Rate and Rhythm: Normal rate.   Pulmonary:      Effort: Pulmonary effort is normal.   Abdominal:      General: Bowel sounds are normal.   Musculoskeletal:      Right lower leg: No edema.      Left lower leg: No edema.      Comments: Using cane for ambulation. R foot drop.   Skin:     General: Skin is warm and dry.      Capillary Refill: Capillary refill takes less than 2 seconds.   Neurological:      Mental Status: She is alert and oriented to person, place, and time.   Psychiatric:         Behavior: Behavior normal.         Thought Content: Thought content normal.         Judgment: Judgment normal.               Diagnoses and health risks identified today and associated recommendations/orders:    1. Encounter for preventive health examination  Assessments " completed.   recommendations reviewed. Flu shot and rsv vaccine today. Discussed second shingrix vaccine and new covid booster. Declines colonoscopy at this time.  F/u with PCP as instructed.    2. Flaccid hemiplegia of right dominant side due to noncerebrovascular etiology  Chronic, stable on current regimen. Followed by PCP.    3. Severe obesity (BMI 35.0-39.9) with comorbidity  Chronic, stable on current regimen. Followed by PCP.    4. Spinal enthesopathy  Chronic, stable on current regimen. Followed by pain med.    5. Malignant neoplasm of upper-inner quadrant of right breast in female, estrogen receptor positive  Chronic, stable on current regimen. Followed by oncology.    6. Prophylactic use of anastrozole (Arimidex)  Chronic, stable on current regimen. Followed by oncology.    7. Essential hypertension  Chronic, stable on current regimen. Followed by outside cardiology.    8. Hyperlipidemia, unspecified hyperlipidemia type  Chronic, stable on current regimen. Followed by outside cardiology.    9. OLI (generalized anxiety disorder)  Chronic, stable on current regimen. Followed by PCP.    10. Mild persistent asthma, unspecified whether complicated  Chronic, stable on current regimen. Followed by PCP.    11. Bronchitis  Chronic, stable on current regimen. Followed by PCP.    12. Chronic shortness of breath  Chronic, stable on current regimen. Followed by PCP / outside cardiology.    13. Osteoporosis without current pathological fracture, unspecified osteoporosis type  Chronic, stable on current regimen. Followed by PCP.    14. Primary insomnia  Chronic, stable on current regimen. Followed by PCP.    15. Acquired right foot drop  Chronic, stable on current regimen. Followed by PCP.    16. Abnormality of gait and mobility  Chronic, stable. One recent fall. Uses cane for ambulation. Followed by PCP.    17. Dependence on other enabling machines and devices  Chronic, stable. One recent fall. Uses cane for  ambulation. Followed by PCP.      Provided Julissa with a 5-10 year written screening schedule and personal prevention plan. Recommendations were developed using the USPSTF age appropriate recommendations. Education, counseling, and referrals were provided as needed. After Visit Summary printed and given to patient which includes a list of additional screenings\tests needed.    Follow up in about 1 year (around 10/6/2024) for Medicare AWV and with PCP as instructed.       Sahra Sosa NP    I offered to discuss advanced care planning, including how to pick a person who would make decisions for you if you were unable to make them for yourself, called a health care power of , and what kind of decisions you might make such as use of life sustaining treatments such as ventilators and tube feeding when faced with a life limiting illness recorded on a living will that they will need to know. (How you want to be cared for as you near the end of your natural life)     X  Patient has advanced directives written and agrees to provide copies to the institution.

## 2023-10-06 NOTE — PATIENT INSTRUCTIONS
1. Follow up with Dean Reis MD as instructed.    2. Flu shot and RSV vaccine today.    3. Second dose of Shingrix now covered by medicare at no out of pocket cost. Available at any pharmacy if interested.    4. New covid boosters should be available at [facility] soon.    Counseling and Referral of Other Preventative  (Italic type indicates deductible and co-insurance are waived)    Patient Name: Julissa Singletary  Today's Date: 10/6/2023    Health Maintenance       Date Due Completion Date    Shingles Vaccine (2 of 2) 10/11/2019 8/16/2019    Influenza Vaccine (1) 09/01/2023 10/13/2022    COVID-19 Vaccine (5 - 2023-24 season) 09/01/2023 10/13/2022    Colorectal Cancer Screening 10/03/2023 10/3/2013 (Done)    Override on 10/3/2013: Done    Mammogram 06/14/2024 6/14/2023    DEXA Scan 06/14/2025 6/14/2023    Lipid Panel 02/13/2028 2/13/2023    TETANUS VACCINE 07/29/2029 7/29/2019        No orders of the defined types were placed in this encounter.    The following information is provided to all patients.  This information is to help you find resources for any of the problems found today that may be affecting your health:                Living healthy guide: www.[facility].[place].gov      Understanding Diabetes: www.diabetes.org      Eating healthy: www.cdc.gov/healthyweight      CDC home safety checklist: www.cdc.gov/steadi/patient.html      Agency on Aging: www.goea.[place].gov      Alcoholics anonymous (AA): www.aa.org      Physical Activity: www.josh.nih.gov/py4tvfh      Tobacco use: www.quitwithusla.org

## 2023-10-11 ENCOUNTER — OFFICE VISIT (OUTPATIENT)
Dept: ORTHOPEDICS | Facility: CLINIC | Age: 73
End: 2023-10-11
Payer: MEDICARE

## 2023-10-11 VITALS — HEIGHT: 62 IN | WEIGHT: 211.44 LBS | BODY MASS INDEX: 38.91 KG/M2

## 2023-10-11 DIAGNOSIS — G89.29 CHRONIC LEFT SHOULDER PAIN: ICD-10-CM

## 2023-10-11 DIAGNOSIS — M25.512 CHRONIC LEFT SHOULDER PAIN: ICD-10-CM

## 2023-10-11 DIAGNOSIS — M19.011 OSTEOARTHRITIS, LOCALIZED, SHOULDER, RIGHT: Primary | ICD-10-CM

## 2023-10-11 PROCEDURE — 20610 LARGE JOINT ASPIRATION/INJECTION: R GLENOHUMERAL: ICD-10-PCS | Mod: S$PBB,RT,, | Performed by: PHYSICIAN ASSISTANT

## 2023-10-11 PROCEDURE — 99999PBSHW PR PBB SHADOW TECHNICAL ONLY FILED TO HB: Mod: PBBFAC,,,

## 2023-10-11 PROCEDURE — 99213 OFFICE O/P EST LOW 20 MIN: CPT | Mod: S$PBB,25,, | Performed by: PHYSICIAN ASSISTANT

## 2023-10-11 PROCEDURE — 20610 DRAIN/INJ JOINT/BURSA W/O US: CPT | Mod: PBBFAC,RT | Performed by: PHYSICIAN ASSISTANT

## 2023-10-11 PROCEDURE — 20610 DRAIN/INJ JOINT/BURSA W/O US: CPT | Mod: S$PBB,RT,, | Performed by: PHYSICIAN ASSISTANT

## 2023-10-11 PROCEDURE — 99213 OFFICE O/P EST LOW 20 MIN: CPT | Mod: PBBFAC | Performed by: PHYSICIAN ASSISTANT

## 2023-10-11 PROCEDURE — 99213 PR OFFICE/OUTPT VISIT, EST, LEVL III, 20-29 MIN: ICD-10-PCS | Mod: S$PBB,25,, | Performed by: PHYSICIAN ASSISTANT

## 2023-10-11 PROCEDURE — 99999 PR PBB SHADOW E&M-EST. PATIENT-LVL III: CPT | Mod: PBBFAC,,, | Performed by: PHYSICIAN ASSISTANT

## 2023-10-11 PROCEDURE — 99999 PR PBB SHADOW E&M-EST. PATIENT-LVL III: ICD-10-PCS | Mod: PBBFAC,,, | Performed by: PHYSICIAN ASSISTANT

## 2023-10-11 PROCEDURE — 99999PBSHW PR PBB SHADOW TECHNICAL ONLY FILED TO HB: ICD-10-PCS | Mod: PBBFAC,,,

## 2023-10-11 RX ADMIN — LIDOCAINE HYDROCHLORIDE 2 ML: 10 INJECTION, SOLUTION INFILTRATION; PERINEURAL at 03:10

## 2023-10-11 RX ADMIN — TRIAMCINOLONE ACETONIDE 40 MG: 40 INJECTION, SUSPENSION INTRA-ARTICULAR; INTRAMUSCULAR at 03:10

## 2023-10-11 NOTE — PROGRESS NOTES
SUBJECTIVE:     Chief Complaint & History of Present Illness:  Julissa Singletary is a 72 y.o. year old female who presents today with constant right shoulder pain that started several months ago.  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 There is no recent injury.  She was previously seen by orthopedics for this however at the time the pain seemed to be radicular and cervical work up was done.  She had recent ARIANE with pain management and states she had no relief. The pain is located in the  lateral and  upper arm aspect of the shoulder.  The pain is described as achy.  It is aggravated by activity, however she states her motion in this shoulder was limited at baseline.   There is not a history of previous injury or surgery to the shoulder.      Review of patient's allergies indicates:  No Known Allergies      Current Outpatient Medications   Medication Sig Dispense Refill    albuterol (VENTOLIN HFA) 90 mcg/actuation inhaler Inhale 2 puffs into the lungs every 4 (four) hours as needed for Wheezing or Shortness of Breath. Rescue 18 g 1    alendronate (FOSAMAX) 70 MG tablet Take 1 tablet (70 mg total) by mouth every 7 days. 12 tablet 0    anastrozole (ARIMIDEX) 1 mg Tab Take 1 tablet (1 mg total) by mouth once daily. 90 tablet 3    atorvastatin (LIPITOR) 20 MG tablet Take one tablet by mouth once daily 90 tablet 3    calcium carbonate (OS-BEBE) 600 mg calcium (1,500 mg) Tab Take 600 mg by mouth once.      carvediloL (COREG) 6.25 MG tablet TAKE 1 TABLET (6.25 MG TOTAL) BY MOUTH 2 (TWO) TIMES A DAY. 180 tablet 3    EScitalopram oxalate (LEXAPRO) 20 MG tablet TAKE 1 TABLET (20 MG TOTAL) BY MOUTH ONCE DAILY. 90 tablet 0    furosemide (LASIX) 20 MG tablet Take 20 mg by mouth.      gabapentin (NEURONTIN) 300 MG capsule Take 1 capsule (300 mg total) by mouth every evening. 30 capsule 11    melatonin 5 mg Tab Take 1 tablet by mouth nightly as needed.      meloxicam  (MOBIC) 15 MG tablet Take 1 tablet (15 mg total) by mouth once daily. 30 tablet 0    nystatin (MYCOSTATIN) powder Apply topically 3 (three) times daily. Apply under breasts to rash 60 g 1    tiZANidine (ZANAFLEX) 4 MG tablet Take 1 tablet (4 mg total) by mouth 3 (three) times daily as needed (muscle spasm). 30 tablet 1    TURMERIC ORAL Take 1 tablet by mouth once daily.      valsartan-hydrochlorothiazide (DIOVAN-HCT) 320-25 mg per tablet TAKE 1 TABLET BY MOUTH ONCE DAILY. 90 tablet 3     No current facility-administered medications for this visit.       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     Right Breast cancer     2020       Past Surgical History:   Procedure Laterality Date    BREAST BIOPSY Right 2021    BREAST LUMPECTOMY Right     2020    BREAST SURGERY Right     2021    HYSTERECTOMY      MASTECTOMY, PARTIAL Right 09/04/2020    Procedure: MASTECTOMY, PARTIAL RIGHT WITH REFLECTOR;  Surgeon: Mike Haro MD;  Location: [facility];  Service: General;  Laterality: Right;    OOPHORECTOMY      SENTINEL LYMPH NODE BIOPSY Right 09/04/2020    Procedure: BIOPSY, LYMPH NODE, SENTINEL RIGHT;  Surgeon: Mike Haro MD;  Location: [facility] OR;  Service: General;  Laterality: Right;    TRANSFORAMINAL EPIDURAL INJECTION OF STEROID Right 8/3/2023    Procedure: CERVICAL TRANSFORAMINAL Right C5/6 DIRECT REFERRAL;  Surgeon: Casandra Carballo MD;  Location: [facility];  Service: Pain Management;  Laterality: Right;       Review of Systems:  ROS:  Constitutional: no fever or chills  Eyes: no visual changes  ENT: no nasal congestion or sore throat  Respiratory: no cough or shortness of breath  Musculoskeletal: no arthralgias or myalgias      OBJECTIVE:     PHYSICAL EXAM:    General:   There is no height or weight on file to calculate BMI.  Patient is alert, awake and oriented to time, place and person. Mood and affect are  appropriate.  Patient does not appear to be in any distress, denies any constitutional symptoms and appears stated age.   HEENT: Pupils are equal and round, sclera are not injected. External examination of ears and nose reveals no abnormalities. Cranial nerves II-X are grossly intact  Neck: examination demonstrates painless  active range of motion. Spurling's sign is negative  Skin: no rashes, abrasions or open wounds on the affected extremity   Resp: No respiratory distress or audible wheezing   Psych:  normal mood and behavior  CV: 2+  pulses, all extremities warm and well perfused   Right Shoulder   Skin intact  No swelling or warmth  Tenderness: globally  Range of motion is painful   ROM: limited- Active FE to 90, IR to hip  Special Tests:    Crossbody test: negative    Neer's positive  Hawkin's positive    Jennifer's positive  Drop arm negative  IMAGING:  X-rays of the right shoulder, personally reviewed by me, demonstrate moderate-severe osteoarthritis with glenohumeral joint space narrowing, subchondral sclerosis.  No fracture or dislocation.     ASSESSMENT     1. Osteoarthritis, localized, shoulder, right    2. Chronic left shoulder pain        PLAN:     Discussed with the patient at length all the different treatment options available for her right shoulder including anti-inflammatories, acetaminophen, rest, ice, Physical therapy, occasional cortisone injections for temporary relief, and shoulder arthroscopy    - I suspect most of her symptoms are related to shoulder osteoarthritis.  She elected to proceed with right shoulder CSI and will let me know in 2 weeks if she has any relief.  - Follow up if symptoms worsen or fail to improve

## 2023-10-11 NOTE — PROCEDURES
Large Joint Aspiration/Injection: R glenohumeral    Date/Time: 10/11/2023 3:30 PM    Performed by: Natalya Carranza PA-C  Authorized by: Natalya Carranza PA-C    Consent Done?:  Yes (Verbal)  Indications:  Pain  Timeout: prior to procedure the correct patient, procedure, and site was verified    Prep: patient was prepped and draped in usual sterile fashion    Local anesthetic:  Topical anesthetic    Details:  Needle Size:  22 G  Approach:  Posterior  Location:  Shoulder  Site:  R glenohumeral  Medications:  40 mg triamcinolone acetonide 40 mg/mL; 2 mL LIDOcaine HCL 10 mg/ml (1%) 10 mg/mL (1 %)  Patient tolerance:  Patient tolerated the procedure well with no immediate complications

## 2023-10-16 RX ORDER — TRIAMCINOLONE ACETONIDE 40 MG/ML
40 INJECTION, SUSPENSION INTRA-ARTICULAR; INTRAMUSCULAR
Status: DISCONTINUED | OUTPATIENT
Start: 2023-10-11 | End: 2023-10-16 | Stop reason: HOSPADM

## 2023-10-16 RX ORDER — LIDOCAINE HYDROCHLORIDE 10 MG/ML
2 INJECTION INFILTRATION; PERINEURAL
Status: DISCONTINUED | OUTPATIENT
Start: 2023-10-11 | End: 2023-10-16 | Stop reason: HOSPADM

## 2023-10-23 ENCOUNTER — TELEPHONE (OUTPATIENT)
Dept: ORTHOPEDICS | Facility: CLINIC | Age: 73
End: 2023-10-23
Payer: MEDICARE

## 2023-10-23 ENCOUNTER — OFFICE VISIT (OUTPATIENT)
Dept: UROGYNECOLOGY | Facility: CLINIC | Age: 73
End: 2023-10-23
Payer: MEDICARE

## 2023-10-23 VITALS
WEIGHT: 212.94 LBS | SYSTOLIC BLOOD PRESSURE: 154 MMHG | BODY MASS INDEX: 39.19 KG/M2 | HEIGHT: 62 IN | HEART RATE: 91 BPM | DIASTOLIC BLOOD PRESSURE: 92 MMHG

## 2023-10-23 DIAGNOSIS — N81.6 POSTERIOR VAGINAL WALL PROLAPSE: ICD-10-CM

## 2023-10-23 DIAGNOSIS — N95.2 ATROPHIC VAGINITIS: ICD-10-CM

## 2023-10-23 DIAGNOSIS — N39.46 MIXED INCONTINENCE URGE AND STRESS: Primary | ICD-10-CM

## 2023-10-23 DIAGNOSIS — R39.15 URINARY URGENCY: ICD-10-CM

## 2023-10-23 PROCEDURE — 99214 PR OFFICE/OUTPT VISIT, EST, LEVL IV, 30-39 MIN: ICD-10-PCS | Mod: S$PBB,,, | Performed by: OBSTETRICS & GYNECOLOGY

## 2023-10-23 PROCEDURE — 99215 OFFICE O/P EST HI 40 MIN: CPT | Mod: PBBFAC | Performed by: OBSTETRICS & GYNECOLOGY

## 2023-10-23 PROCEDURE — 99999 PR PBB SHADOW E&M-EST. PATIENT-LVL V: ICD-10-PCS | Mod: PBBFAC,,, | Performed by: OBSTETRICS & GYNECOLOGY

## 2023-10-23 PROCEDURE — 99999 PR PBB SHADOW E&M-EST. PATIENT-LVL V: CPT | Mod: PBBFAC,,, | Performed by: OBSTETRICS & GYNECOLOGY

## 2023-10-23 PROCEDURE — 87086 URINE CULTURE/COLONY COUNT: CPT | Performed by: OBSTETRICS & GYNECOLOGY

## 2023-10-23 PROCEDURE — 99214 OFFICE O/P EST MOD 30 MIN: CPT | Mod: S$PBB,,, | Performed by: OBSTETRICS & GYNECOLOGY

## 2023-10-23 RX ORDER — ESTRADIOL 0.1 MG/G
1 CREAM VAGINAL
Qty: 8 G | Refills: 11 | Status: SHIPPED | OUTPATIENT
Start: 2023-10-23 | End: 2024-10-22

## 2023-10-23 RX ORDER — TROSPIUM CHLORIDE 20 MG/1
20 TABLET, FILM COATED ORAL 2 TIMES DAILY
Qty: 60 TABLET | Refills: 11 | Status: SHIPPED | OUTPATIENT
Start: 2023-10-23 | End: 2024-10-22

## 2023-10-23 NOTE — PATIENT INSTRUCTIONS
1.  Mixed urinary incontinence, urge > stress:   --Bladder diary for 3-7 days, write the number of times you go to the rest room and associated symptoms of urgency or leakage.   --Empty bladder every 3 hours.  Empty well: wait a minute, lean forward on toilet.    --Avoid dietary irritants (see sheet).  Keep diary x 3-5 days to determine your irritants.  --KEGELS: do 10 in AM and 10 in PM, holding each x 10 seconds.  When you feel urge to go, STOP, KEGEL, and when urge has passed, then go to bathroom.  Start pelvic floor PT.     --URGE: Tropsium 20 mg BID.  SE profile reviewed.    Takes 2-4 weeks to see if will have effect.  For dry mouth: get sour, sugar free lozenge or gum.    --STRESS:  Non surgical options: Pessary vs. Impressa vs Rivive - which represent urethral and bladder support devices; Surgical options including 1.  mid urethral sling; retropubic, transobturator vs single incision 2. Fascial slings 3. Rahman procedure 4. Periurethral bulking     2. --Nocturia (nighttime urination): stop fluids 2 hours before bed.  If have leg swelling:  Elevate feet above chest x 1 hour before bed to get excess fluid off.  Can also use support hose (knee highs).      3. Vaginal atrophy (dryness):  Use 1 gram of estrogen cream in vagina nightly x 2 weeks, then twice a week thereafter.

## 2023-10-23 NOTE — PROGRESS NOTES
Subjective:       Patient ID: Julissa Singletary is a 72 y.o. female.    Chief Complaint:  Urinary Incontinence and Urinary Frequency        History of Present Illness   72 y.o.  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  Referred by Dr. Lowery for evaluation of incontinence. She has been wearing pads for six months. She wakes up 3-4 at night with leakage. She wear a depends at night and pads during the day. She has bothersome urinary urgency. 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     [facility]    Question 10/23/2023  2:39 PM CDT - Filed by Carin Segura MA   General Urogynecology: Are you experiencing the following?    Dysuria (painful urination) No   Nocturia:  waking up at night to empty your bladder  Yes   If you answered yes to the previous question, how many times does this happen per night? 3-4   Enuresis (urine loss during sleep) Yes   Dribbling urine after you urinate Yes   Hematuria (urine appears red) No   Type of stream Weak   Urinary frequency: How often a day are you going to the bathroom per day?  10-15   Urinary Tract Infections: How many Urinary Tract Infections have you had in the past year? I have not had a UTI in the past year   If you have had a UTI in the past year, what treatments have you had so far?  I have not had a UTI in the past year   Urinary Incontinence (General): Are you experiencing the following?    Past consultation for incontinence: Have you ever seen someone for the evaluation of incontinence? No   If you answered yes to the previous question, please select all the therapies you have tried.  N/a- I answered no to the previous question   Please note the effectiveness of the therapies.    Need to wear protection to keep clothes dry  Yes   If you answered yes to the previous question, please aimee the protection you  "use.  Pads   If you wear protection, how much wetness is typically on each pad? Soaked   If you wear protection, how often do you have to change per day, if applicable?     Stress Symptoms: Are you experiencing the following?    Leakage of urine with cough, laugh and/or sneeze Yes   If you answered yes to the previous question, what is the frequency in days, weeks and/or months? Several times a week   Leakage of urine with sex No   Leakage of urine with bending/ lifting Yes   Leakage of urine with briskly walking or jogging Yes   If you lose urine for any other reason not previously mentioned, please note it below, if applicable.     Urge Symptoms: Are you experiencing the following?    Urgency ("got to go" feeling) No   Urge: How frequently do you feel an urge to urinate (feeling like you "gotta go" to the bathroom and can't wait) Never   Do you experience a leakage of urine when you have a feeling of urgency?  No   Leakage of urine when unaware Yes   Past use of anticholinergics (medications used to treat overactive bladder) No   If you answered yes to the previous question, please aimee the anticholinergics you have used:     Have you ever used Mirbetriq (aka Mirabegron)?  No   Prolapse Symptoms: Are you experiencing any of the following?     Falling out/ Bulging/ Heaviness in the vagina No   Vaginal/ Abdominal Pain/ Pressure No   Need to strain/ Push to void No   Need to wait on the toilet before you void No   Unusual position to urinate (using your hands to push back the vaginal bulge) No   Sensation of incomplete emptying Yes   Past use of pessary device No   If you answered yes to the previous question, please list the devices you have used below.     Bowel Symptoms: Are you experiencing any of the following?    Constipation No   Diarrhea  Yes   Hematochezia (bloody stool) No   Incomplete evacuation of stool No   Involuntary loss of formed stool Yes   Fecal smearing/urgency No   Involuntary loss of gas No "   Vaginal Symptoms: Are you experiencing any of the following?     Abnormal vaginal bleeding  No   Vaginal dryness No   Sexually active  No   Dyspareunia (painful intercourse) No   Estrogen use  No         GYN & OB History  No LMP recorded (lmp unknown). Patient has had a hysterectomy.   Date of Last Pap: No result found    OB History    Para Term  AB Living   2 2 2     2   SAB IAB Ectopic Multiple Live Births           2      # Outcome Date GA Lbr Gil/2nd Weight Sex Delivery Anes PTL Lv   2 Term         LIZETT   1 Term         LIZETT     OB     x 2.  C/s x 0.    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     Right Breast cancer          Past Surgical History:   Procedure Laterality Date    BREAST BIOPSY Right     BREAST LUMPECTOMY Right         BREAST SURGERY Right         HYSTERECTOMY      MASTECTOMY, PARTIAL Right 2020    Procedure: MASTECTOMY, PARTIAL RIGHT WITH REFLECTOR;  Surgeon: Mike Haro MD;  Location: [facility] OR;  Service: General;  Laterality: Right;    OOPHORECTOMY      SENTINEL LYMPH NODE BIOPSY Right 2020    Procedure: BIOPSY, LYMPH NODE, SENTINEL RIGHT;  Surgeon: Mike Haro MD;  Location: [facility] OR;  Service: General;  Laterality: Right;    TRANSFORAMINAL EPIDURAL INJECTION OF STEROID Right 8/3/2023    Procedure: CERVICAL TRANSFORAMINAL Right C5/6 DIRECT REFERRAL;  Surgeon: Casandra Carballo MD;  Location: [facility];  Service: Pain Management;  Laterality: Right;       Review of Systems  Review of Systems   Constitutional:   Negative for chills and fever.   HENT:  Positive for sore throat.    Gastrointestinal:  Positive for diarrhea. Negative for abdominal pain, constipation, nausea and vomiting.   Genitourinary:  Positive for frequency and urgency. Negative for dysuria, flank pain, hematuria, pelvic pain and vaginal discharge.   Musculoskeletal:  Negative for back pain.   Skin:  Positive for rash.   Neurological:  Negative for headaches.           Objective:     Physical Exam   Constitutional: She is oriented to person, place, and time. She appears well-developed.   HENT:   Head: Normocephalic and atraumatic.   Eyes: Conjunctivae and EOM are normal.   Cardiovascular: Normal rate, regular rhythm, S1 normal, S2 normal, normal heart sounds and intact distal pulses.   Pulmonary/Chest: Effort normal and breath sounds normal. She exhibits no tenderness.   Abdominal: Soft. Bowel sounds are normal. She exhibits no distension and no mass. There is no splenomegaly or hepatomegaly. There is no abdominal tenderness. There is no rigidity, no rebound and no guarding. Hernia confirmed negative in the right inguinal area and confirmed negative in the left inguinal area.   Genitourinary: Pelvic exam was performed with patient supine. Rectum normal, vagina normal, skenes normal and bartholins normal. Right labia normal and left labia normal. Urethra exhibits hypermobility. Urethra exhibits no urethral caruncle, no urethral diverticulum and no urethral mass. Right bartholin is not enlarged and not tender. Left bartholin is not enlarged and not tender. Rectal exam shows resting tone normal and active tone normal. Rectal exam shows no external hemorrhoid, no fissure, no tenderness, anal tone normal and no dovetailing. Guaiac negative stool. There is a rectocele and atrophy in the vagina. No foreign body, tenderness, bleeding, unspecified prolapse of vaginal walls, fistula, mesh exposure or lavator tenderness in the vagina. Right adnexum displays no  tenderness. Left adnexum displays no tenderness. Uterus is absent.   PVR: 40 ML  Empty cough stress test: Negative.  Kegel: 2/5    POP-Q  Aa: -2 Ba: -2 C: -5   GH: 3 PB: 2 TVL: 8   Ap: 0 Bp: 0 D:                      Musculoskeletal:         General: Normal range of motion.      Cervical back: Normal range of motion and neck supple.   Neurological: She is alert and oriented to person, place, and time. She has normal strength, normal reflexes and intact cranial nerves (2-12). Cranial nerves II through XII intact. No cranial nerve deficit.   Skin: Skin is warm and dry.   Psychiatric: She has a normal mood and affect. Her speech is normal and behavior is normal. Judgment normal.               HCM  Pap's: No history of Abnormal paps's   Mammo: BIRADS: 2; Last imaging  2023    Colonoscopy: due 202023- declines - discussed Cologaurd   Dexa: Osteoporosis       Assessment:        1. Mixed incontinence urge and stress    2. Urinary urgency    3. Atrophic vaginitis    4. Posterior vaginal wall prolapse               Plan:    1.  Mixed urinary incontinence, urge > stress:   --Bladder diary for 3-7 days, write the number of times you go to the rest room and associated symptoms of urgency or leakage.   --Empty bladder every 3 hours.  Empty well: wait a minute, lean forward on toilet.    --Avoid dietary irritants (see sheet).  Keep diary x 3-5 days to determine your irritants.  --KEGELS: do 10 in AM and 10 in PM, holding each x 10 seconds.  When you feel urge to go, STOP, KEGEL, and when urge has passed, then go to bathroom.  Start pelvic floor PT.     --URGE: Tropsium 20 mg BID.  SE profile reviewed.    Takes 2-4 weeks to see if will have effect.  For dry mouth: get sour, sugar free lozenge or gum.    --STRESS:  Non surgical options: Pessary vs. Impressa vs Rivive - which represent urethral and bladder support devices; Surgical options including 1.  mid urethral sling; retropubic, transobturator vs single incision 2. Fascial  slings 3. Rahman procedure 4. Periurethral bulking     2. --Nocturia (nighttime urination): stop fluids 2 hours before bed.  If have leg swelling:  Elevate feet above chest x 1 hour before bed to get excess fluid off.  Can also use support hose (knee highs).      3. Vaginal atrophy (dryness):  Use 1 gram of estrogen cream in vagina nightly x 2 weeks, then twice a week thereafter.      4. Prolapse: Stage 1  apical prolapse, Stage 2 posterior vaginal wall prolapse - asymptomatic   --Daily pelvic floor exercises as assigned,  Pelvic floor PT   --Non surgical options discussed with patient      Approximately 35 minutes of total time spent in consult, 75 % in discussion. This includes face to face time and non-face to face time preparing to see the patient, obtaining and/or reviewing separately obtained history, documenting clinical information in the electronic or other health record, independently interpreting results (not separately reported) and communicating results to the patient/family/caregiver, or care coordination (not separately reported).      Thank you for requesting consultation of your patient.  I look forward to participating in her care.    Leann Leone DO  Female Pelvic Medicine and Reconstructive Surgery  [facility] [place], [place]

## 2023-10-24 DIAGNOSIS — M81.0 OSTEOPOROSIS WITHOUT CURRENT PATHOLOGICAL FRACTURE, UNSPECIFIED OSTEOPOROSIS TYPE: ICD-10-CM

## 2023-10-24 LAB — BACTERIA UR CULT: NO GROWTH

## 2023-10-24 RX ORDER — ALENDRONATE SODIUM 70 MG/1
70 TABLET ORAL
Qty: 12 TABLET | Refills: 3 | Status: SHIPPED | OUTPATIENT
Start: 2023-10-24

## 2023-10-24 NOTE — TELEPHONE ENCOUNTER
Refill Routing Note   Medication(s) are not appropriate for processing by [facility] for the following reason(s):      Medication outside of protocol    [facility] action(s):  Route Care Due:  Labs due            Appointments  past 12m or future 3m with PCP    Date Provider   Last Visit   5/30/2023 Dean Lowery MD   Next Visit   Visit date not found Dean Lowery MD   ED visits in past 90 days: 0        Note composed:10:20 AM 10/24/2023

## 2023-10-24 NOTE — TELEPHONE ENCOUNTER
Care Due:                  Date            Visit Type   Department     Provider  --------------------------------------------------------------------------------                                EP -                              PRIMARY      [facility]  Last Visit: 05-      CARE ([facility])   PRIMARY CARE   Dean Lowery  Next Visit: None Scheduled  None         None Found                                                            Last  Test          Frequency    Reason                     Performed    Due Date  --------------------------------------------------------------------------------    CMP.........  12 months..  alendronate,               03- 03-                             atorvastatin,                             valsartan-hydrochlorothia                             zide.....................    Lipid Panel.  12 months..  atorvastatin.............  03- 03-    Mg Level....  12 months..  alendronate..............  Not Found    Overdue    Phosphate...  12 months..  alendronate..............  Not Found    Overdue    Vitamin D...  12 months..  alendronate..............  Not Found    Overdue    [employer] Embedded Care Due Messages. Reference number: [id number].   10/24/2023 9:55:50 AM CDT

## 2023-11-24 ENCOUNTER — NURSE TRIAGE (OUTPATIENT)
Dept: ADMINISTRATIVE | Facility: CLINIC | Age: 73
End: 2023-11-24
Payer: MEDICARE

## 2023-11-24 NOTE — TELEPHONE ENCOUNTER
Called pt regarding message. Pt was seen in ER today. Pt stated symptoms have resolved. Verbalized understanding.

## 2023-11-24 NOTE — TELEPHONE ENCOUNTER
Patient is calling, states her RX Trospium taking since 10/23, itching started 2 or 3 weeks ago, really since she started the RX. States she wants to go lunch with her family and is itching too much. This is a chronic problem since starting on RX, she took antihistamine but is not helping. State she does have hives on her neck. Triage done- see provider today. Advised I could try for appointment with Dr. Lowery, states she is going to lunch at noon and needs to see someone before then. Discussed UC or ON DEMAND visit. Patient states she does not do any computer visits. She will just go to the closest . No further questions or concerns at this time.     Reason for Disposition   MODERATE-SEVERE hives persist (i.e., hives interfere with normal activities or work) and taking antihistamine (e.g., Benadryl, Claritin) > 24 hours    Additional Information   Negative: Difficulty breathing or wheezing now   Negative: Rapid onset of swollen tongue   Negative: Rapid onset of hoarseness or cough   Negative: Very weak (can't stand)   Negative: Difficult to awaken or acting confused (e.g., disoriented, slurred speech)   Negative: Life-threatening reaction (anaphylaxis) in the past to similar substance (e.g., food, insect bite/sting, chemical, etc.) and < 2 hours since exposure   Negative: Sounds like a life-threatening emergency to the triager   Negative: Swollen tongue   Negative: Widespread hives, itching, or facial swelling and onset < 2 hours of exposure to high-risk allergen (e.g., 1st dose of antibiotic, nuts, sting)   Negative: Patient sounds very sick or weak to the triager   Negative: Life-threatening reaction (anaphylaxis) in the past to similar substance (e.g., food, insect bite/sting, chemical, etc.) and < 2 hours since exposure   Negative: Difficulty breathing or wheezing   Negative: Difficulty swallowing or slurred speech and sudden onset   Negative: Sounds like a life-threatening emergency to the triager   Hives  suspected (urticaria, itchy pink bumps with pale centers that come and go over minutes to hours)   Negative: Bee, wasp, or yellow jacket sting within last 24 hours   Negative: Taking a new medicine now or within last 3 days  (Exception: Antihistamine, decongestant or other OTC cough/cold medicines.)   Negative: Doesn't match the SYMPTOMS of hives    Protocols used: Itching - Widespread-A-OH, Hives-A-OH

## 2023-11-27 ENCOUNTER — TELEPHONE (OUTPATIENT)
Dept: PRIMARY CARE CLINIC | Facility: CLINIC | Age: 73
End: 2023-11-27
Payer: MEDICARE

## 2023-11-27 NOTE — TELEPHONE ENCOUNTER
----- Message from Aixa Escobar sent at 11/27/2023  9:19 AM CST -----  Please call patient she is very depressed and needs a referral to speak to someone.also would like to change her pharmacy to [facility] in [place].

## 2023-11-27 NOTE — TELEPHONE ENCOUNTER
Spoke with patient, she does not think there is a correlation to the hives. She's had hives before. She is taking once a day which is not causing symptoms of dryness. She is having a hard time grieving since her  . She is starting to see a counselor, denies suicidal or homicidal ideation.

## 2024-01-02 DIAGNOSIS — F41.1 GAD (GENERALIZED ANXIETY DISORDER): ICD-10-CM

## 2024-01-02 DIAGNOSIS — M54.12 CERVICAL RADICULOPATHY: ICD-10-CM

## 2024-01-02 NOTE — TELEPHONE ENCOUNTER
Care Due:                  Date            Visit Type   Department     Provider  --------------------------------------------------------------------------------                                [place] -                              [place]      [facility]  Last Visit: 05-      CARE ([facility])   PRIMARY CARE   Dean Lowery  Next Visit: None Scheduled  None         None Found                                                            Last  Test          Frequency    Reason                     Performed    Due Date  --------------------------------------------------------------------------------    CMP.........  12 months..  alendronate,               03- 03-                             atorvastatin,                             valsartan-hydrochlorothia                             zide.....................    Lipid Panel.  12 months..  atorvastatin.............  03- 03-    Mg Level....  12 months..  alendronate..............  Not Found    Overdue    Phosphate...  12 months..  alendronate..............  Not Found    Overdue    Vitamin D...  12 months..  alendronate..............  Not Found    Overdue    [employer] Embedded Care Due Messages. Reference number: [id number].   1/02/2024 11:46:48 AM CST

## 2024-01-02 NOTE — TELEPHONE ENCOUNTER
----- Message from Marielena White sent at 1/2/2024 11:42 AM CST -----  Contact: [phone] Patient  Requesting an RX refill or new RX.  Is this a refill or new RX: new  RX name and strength (copy/paste from chart):  meloxicam (MOBIC) 15 MG tablet  Is this a 30 day or 90 day RX:   Pharmacy name and phone # (copy/paste from chart):  [facility]  [email or web address] - [address]   Phone: [phone]  Fax: [phone]    Requesting an RX refill or new RX.  Is this a refill or new RX: new  RX name and strength (copy/paste from chart):  EScitalopram oxalate (LEXAPRO) 20 MG tablet  Is this a 30 day or 90 day RX:   Pharmacy name and phone # (copy/paste from chart):  [facility]  [email or web address] - [address]   Phone: [phone]  Fax: [phone]    The doctors have asked that we provide their patients with the following 2 reminders -- prescription refills can take up to 72 hours, and a friendly reminder that in the future you can use your [facility] account to request refills: yes

## 2024-01-03 RX ORDER — MELOXICAM 15 MG/1
15 TABLET ORAL DAILY
Qty: 90 TABLET | Refills: 0 | Status: SHIPPED | OUTPATIENT
Start: 2024-01-03 | End: 2024-02-26

## 2024-01-03 RX ORDER — ESCITALOPRAM OXALATE 20 MG/1
20 TABLET ORAL DAILY
Qty: 90 TABLET | Refills: 0 | Status: SHIPPED | OUTPATIENT
Start: 2024-01-03 | End: 2024-02-07

## 2024-01-03 NOTE — TELEPHONE ENCOUNTER
Refill Routing Note   Medication(s) are not appropriate for processing by [facility] for the following reason(s):        Outside of protocol: Mobic  Due for refill >6 months ago: Lexapro; no dispenses within past 6 months per Epic data     [facility] action(s):  Defer  Route     Requires labs : Yes (CMP, lipid panel, magnesium, phosphate, vitamin d outdated)    ED documentation reviewed. No changes to therapy noted.  Follow up visit within next 90 days required.        Pharmacist review requested: Yes   Extended chart review required: Yes     Appointments  past 12m or future 3m with PCP    Date Provider   Last Visit   5/30/2023 Dean Lowery MD   Next Visit   Visit date not found Dean Lowery MD   ED visits in past 90 days: 1        Note composed:1:05 PM 01/03/2024

## 2024-01-03 NOTE — TELEPHONE ENCOUNTER
Refill Routing Note   Medication(s) are not appropriate for processing by [facility] for the following reason(s):        Outside of protocol  ED/Hospital Visit since last OV with provider    [facility] action(s):  Defer  Route     Requires labs : Yes           Pharmacist review requested: Yes     Appointments  past 12m or future 3m with PCP    Date Provider   Last Visit   5/30/2023 Dean Lowery MD   Next Visit   Visit date not found Dean Lowery MD   ED visits in past 90 days: 1        Note composed:10:23 AM 01/03/2024

## 2024-01-29 ENCOUNTER — PATIENT OUTREACH (OUTPATIENT)
Dept: ADMINISTRATIVE | Facility: HOSPITAL | Age: 74
End: 2024-01-29
Payer: MEDICARE

## 2024-01-29 NOTE — PROGRESS NOTES
Health Maintenance Due   Topic Date Due    Shingles Vaccine (2 of 2) 10/11/2019    COVID-19 Vaccine ( season) 2023     Population Health Chart Review & Patient Outreach Details      Further Action Needed If Patient Returns Outreach:      Chart review done. Spoke with patient in regards to overdue colon cancer screening. Patient refused colonoscopy as well as a home kit at this time.       Updates Requested / Reviewed:     [x]  Care Everywhere    []     []  External Sources ([facility], [facility], DIS, etc.)    [] [facility]   [] [facility]   [] Other:    []  Care Team Updated   []  Removed  or Duplicate Orders   [x]  Immunization Reconciliation Completed / Queried    [x] [place]   [] [place]   [] [place]   [] [place]      Health Maintenance Topics Addressed and Outreach Outcomes / Actions Taken:             Breast Cancer Screening []  Mammogram Order Placed    []  Mammogram Screening Scheduled    []  External Records Requested & Care Team Updated if Applicable    []  External Records Uploaded & Care Team Updated if Applicable    []  Pt Declined Scheduling Mammogram    []  Pt Will Schedule with External Provider / Order Routed & Care Team Updated if Applicable              Cervical Cancer Screening []  Pap Smear Scheduled in Primary Care or OBGYN    []  External Records Requested & Care Team Updated if Applicable       []  External Records Uploaded, Care Team Updated, & History Updated if Applicable    []  Patient Declined Scheduling Pap Smear    []  Patient Will Schedule with External Provider & Care Team Updated if Applicable                  Colorectal Cancer Screening []  Colonoscopy Case Request / Referral / Home Test Order Placed    []  External Records Requested & Care Team Updated if Applicable    []  External Records Uploaded, Care Team Updated, & History Updated if Applicable    [x]  Patient Declined Completing Colon Cancer Screening    []  Patient Will Schedule with External  Provider & Care Team Updated if Applicable    []  Fit Kit Mailed (add the SmartPhrase under additional notes)    []  Reminded Patient to Complete Home Test                Diabetic Eye Exam []  Eye Exam Screening Order Placed    []  Eye Camera Scheduled or Optometry/Ophthalmology Referral Placed    []  External Records Requested & Care Team Updated if Applicable    []  External Records Uploaded, Care Team Updated, & History Updated if Applicable    []  Patient Declined Scheduling Eye Exam    []  Patient Will Schedule with External Provider & Care Team Updated if Applicable             Blood Pressure Control []  Primary Care Follow Up Visit Scheduled     []  Remote Blood Pressure Reading Captured    []  Patient Declined Remote Reading or Scheduling Appt - Escalated to PCP    []  Patient Will Call Back or Send Portal Message with Reading                 HbA1c & Other Labs []  Overdue Lab(s) Ordered    []  Overdue Lab(s) Scheduled    []  External Records Uploaded & Care Team Updated if Applicable    []  Primary Care Follow Up Visit Scheduled     []  Reminded Patient to Complete A1c Home Test    []  Patient Declined Scheduling Labs or Will Call Back to Schedule    []  Patient Will Schedule with External Provider / Order Routed, & Care Team Updated if Applicable           Primary Care Appointment []  Primary Care Appt Scheduled    []  Patient Declined Scheduling or Will Call Back to Schedule    []  Pt Established with External Provider, Updated Care Team, & Informed Pt to Notify Payor if Applicable           Medication Adherence /    Statin Use []  Primary Care Appointment Scheduled    []  Patient Reminded to  Prescription    []  Patient Declined, Provider Notified if Needed    []  Sent Provider Message to Review to Evaluate Pt for Statin, Add Exclusion Dx Codes, Document   Exclusion in Problem List, Change Statin Intensity Level to Moderate or High Intensity if Applicable                Osteoporosis Screening []   Dexa Order Placed    []  Dexa Appointment Scheduled    []  External Records Requested & Care Team Updated    []  External Records Uploaded, Care Team Updated, & History Updated if Applicable    []  Patient Declined Scheduling Dexa or Will Call Back to Schedule    []  Patient Will Schedule with External Provider / Order Routed & Care Team Updated if Applicable       Additional Notes:

## 2024-02-07 DIAGNOSIS — F41.1 GAD (GENERALIZED ANXIETY DISORDER): ICD-10-CM

## 2024-02-07 RX ORDER — ESCITALOPRAM OXALATE 20 MG/1
20 TABLET ORAL
Qty: 90 TABLET | Refills: 1 | Status: SHIPPED | OUTPATIENT
Start: 2024-02-07

## 2024-02-07 NOTE — TELEPHONE ENCOUNTER
Care Due:                  Date            Visit Type   Department     Provider  --------------------------------------------------------------------------------                                EP -                              PRIMARY      [facility]  Last Visit: 05-      CARE ([facility])   PRIMARY CARE   Dean Lowery  Next Visit: None Scheduled  None         None Found                                                            Last  Test          Frequency    Reason                     Performed    Due Date  --------------------------------------------------------------------------------    CBC.........  12 months..  meloxicam................  03- 03-    [facility] Embedded Care Due Messages. Reference number: [id number].   2/07/2024 11:53:49 AM CST

## 2024-02-21 ENCOUNTER — TELEPHONE (OUTPATIENT)
Dept: PRIMARY CARE CLINIC | Facility: CLINIC | Age: 74
End: 2024-02-21
Payer: MEDICARE

## 2024-02-21 NOTE — TELEPHONE ENCOUNTER
----- Message from Gwen Ramires sent at 2/21/2024  8:12 AM CST -----  Contact: [phone]  Patient called, requested a courtesy call from the nurse to get an advise on what pills she should take for energy. Patient said that she walk the stairs and sometimes she is out of breath, and lately on tv they has been recommending some type of medications. Please advise. Thank you.

## 2024-02-22 ENCOUNTER — TELEPHONE (OUTPATIENT)
Dept: PRIMARY CARE CLINIC | Facility: CLINIC | Age: 74
End: 2024-02-22
Payer: MEDICARE

## 2024-02-22 NOTE — TELEPHONE ENCOUNTER
Called pt regarding message. Offered pt next available appt, pt accepted. Pt was scheduled for tomorrow as requested.

## 2024-02-22 NOTE — TELEPHONE ENCOUNTER
----- Message from Dee Kaba sent at 2/22/2024  9:28 AM CST -----  Contact: Epifanio, [phone]  Patient is returning a phone call.  Who left a message for the patient: Callie  Does patient know what this is regarding:  Appointment  Would you like a call back, or a response through your [facility] portal?:   Call back  Comments: Missed your call, please call her back. Thanks.

## 2024-02-22 NOTE — TELEPHONE ENCOUNTER
----- Message from Dee Kaba sent at 2/22/2024  8:13 AM CST -----  Contact: Patient, [phone]  [email or web address]     Patient is calling for Medical Advice regarding: Hives in her mouth    How long has patient had these symptoms: 3 days    Pharmacy name and phone#:   [facility] [place] - [address]  [address] [place] [address]  Phone: [phone] Fax: [phone]    Would like response via [employer]:  Call back    Comments: Call for an appointment, none available. Please call her. Thanks.

## 2024-02-24 DIAGNOSIS — M54.12 CERVICAL RADICULOPATHY: ICD-10-CM

## 2024-02-24 NOTE — TELEPHONE ENCOUNTER
Care Due:                  Date            Visit Type   Department     Provider  --------------------------------------------------------------------------------                                EP -                              PRIMARY      [facility]  Last Visit: 05-      CARE ([facility])   PRIMARY CARE   Dean Lowery  Next Visit: None Scheduled  None         None Found                                                            Last  Test          Frequency    Reason                     Performed    Due Date  --------------------------------------------------------------------------------    Office Visit  12 months..  alendronate, meloxicam...  05- 05-    [facility] Embedded Care Due Messages. Reference number: [id number].   2/24/2024 3:22:46 PM CST

## 2024-02-26 RX ORDER — MELOXICAM 15 MG/1
15 TABLET ORAL
Qty: 90 TABLET | Refills: 0 | Status: SHIPPED | OUTPATIENT
Start: 2024-02-26 | End: 2024-04-05

## 2024-02-26 NOTE — TELEPHONE ENCOUNTER
Refill Routing Note   Medication(s) are not appropriate for processing by [facility] for the following reason(s):        Outside of protocol    [facility] action(s):  Route   Requires appointment : Yes               Appointments  past 12m or future 3m with PCP    Date Provider   Last Visit   5/30/2023 Dean Lowery MD   Next Visit   Visit date not found Dean Lowery MD   ED visits in past 90 days: 0        Note composed:8:23 AM 02/26/2024

## 2024-03-21 ENCOUNTER — OFFICE VISIT (OUTPATIENT)
Dept: UROGYNECOLOGY | Facility: CLINIC | Age: 74
End: 2024-03-21
Payer: MEDICARE

## 2024-03-21 VITALS
DIASTOLIC BLOOD PRESSURE: 63 MMHG | HEART RATE: 71 BPM | SYSTOLIC BLOOD PRESSURE: 129 MMHG | HEIGHT: 62 IN | BODY MASS INDEX: 40.13 KG/M2 | WEIGHT: 218.06 LBS

## 2024-03-21 DIAGNOSIS — N39.46 MIXED INCONTINENCE URGE AND STRESS: Primary | ICD-10-CM

## 2024-03-21 PROCEDURE — 3078F DIAST BP <80 MM HG: CPT | Mod: CPTII,S$GLB,, | Performed by: OBSTETRICS & GYNECOLOGY

## 2024-03-21 PROCEDURE — 99999 PR PBB SHADOW E&M-EST. PATIENT-LVL III: CPT | Mod: PBBFAC,,, | Performed by: OBSTETRICS & GYNECOLOGY

## 2024-03-21 PROCEDURE — 3074F SYST BP LT 130 MM HG: CPT | Mod: CPTII,S$GLB,, | Performed by: OBSTETRICS & GYNECOLOGY

## 2024-03-21 PROCEDURE — 3008F BODY MASS INDEX DOCD: CPT | Mod: CPTII,S$GLB,, | Performed by: OBSTETRICS & GYNECOLOGY

## 2024-03-21 PROCEDURE — 1101F PT FALLS ASSESS-DOCD LE1/YR: CPT | Mod: CPTII,S$GLB,, | Performed by: OBSTETRICS & GYNECOLOGY

## 2024-03-21 PROCEDURE — 99213 OFFICE O/P EST LOW 20 MIN: CPT | Mod: S$GLB,,, | Performed by: OBSTETRICS & GYNECOLOGY

## 2024-03-21 PROCEDURE — 1126F AMNT PAIN NOTED NONE PRSNT: CPT | Mod: CPTII,S$GLB,, | Performed by: OBSTETRICS & GYNECOLOGY

## 2024-03-21 PROCEDURE — 3288F FALL RISK ASSESSMENT DOCD: CPT | Mod: CPTII,S$GLB,, | Performed by: OBSTETRICS & GYNECOLOGY

## 2024-03-21 RX ORDER — HYDROXYZINE HYDROCHLORIDE 10 MG/1
10 TABLET, FILM COATED ORAL 3 TIMES DAILY PRN
Qty: 90 TABLET | Refills: 3 | Status: SHIPPED | OUTPATIENT
Start: 2024-03-21

## 2024-03-21 RX ORDER — MIRABEGRON 25 MG/1
25 TABLET, FILM COATED, EXTENDED RELEASE ORAL DAILY
Qty: 30 TABLET | Refills: 11 | Status: SHIPPED | OUTPATIENT
Start: 2024-03-21 | End: 2024-05-13

## 2024-03-21 NOTE — PROGRESS NOTES
Subjective:       Patient ID: Julissa Singletary is a 73 y.o. female.    Chief Complaint:  Follow-up (Taking trospium twice daily, working well for patient )        History of Present Illness   73 y.o.  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  Referred by Dr. Lowery for evaluation of incontinence. She has been wearing pads for six months. She wakes up 3-4 at night with leakage. She wear a depends at night and pads during the day. She has bothersome urinary urgency. 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     3/21/2024  Patient here for follow up   Tropism twice a day is helping         [facility]    Question 10/23/2023  2:39 PM CDT - Filed by Carin Segura MA   General Urogynecology: Are you experiencing the following?    Dysuria (painful urination) No   Nocturia:  waking up at night to empty your bladder  Yes   If you answered yes to the previous question, how many times does this happen per night? 3-4   Enuresis (urine loss during sleep) Yes   Dribbling urine after you urinate Yes   Hematuria (urine appears red) No   Type of stream Weak   Urinary frequency: How often a day are you going to the bathroom per day?  10-15   Urinary Tract Infections: How many Urinary Tract Infections have you had in the past year? I have not had a UTI in the past year   If you have had a UTI in the past year, what treatments have you had so far?  I have not had a UTI in the past year   Urinary Incontinence (General): Are you experiencing the following?    Past consultation for incontinence: Have you ever seen someone for the evaluation of incontinence? No   If you answered yes to the previous question, please select all the therapies you have tried.  N/a- I answered no to the previous question   Please note the effectiveness of the therapies.    Need to wear protection  "to keep clothes dry  Yes   If you answered yes to the previous question, please aimee the protection you use.  Pads   If you wear protection, how much wetness is typically on each pad? Soaked   If you wear protection, how often do you have to change per day, if applicable?     Stress Symptoms: Are you experiencing the following?    Leakage of urine with cough, laugh and/or sneeze Yes   If you answered yes to the previous question, what is the frequency in days, weeks and/or months? Several times a week   Leakage of urine with sex No   Leakage of urine with bending/ lifting Yes   Leakage of urine with briskly walking or jogging Yes   If you lose urine for any other reason not previously mentioned, please note it below, if applicable.     Urge Symptoms: Are you experiencing the following?    Urgency ("got to go" feeling) No   Urge: How frequently do you feel an urge to urinate (feeling like you "gotta go" to the bathroom and can't wait) Never   Do you experience a leakage of urine when you have a feeling of urgency?  No   Leakage of urine when unaware Yes   Past use of anticholinergics (medications used to treat overactive bladder) No   If you answered yes to the previous question, please aimee the anticholinergics you have used:     Have you ever used Mirbetriq (aka Mirabegron)?  No   Prolapse Symptoms: Are you experiencing any of the following?     Falling out/ Bulging/ Heaviness in the vagina No   Vaginal/ Abdominal Pain/ Pressure No   Need to strain/ Push to void No   Need to wait on the toilet before you void No   Unusual position to urinate (using your hands to push back the vaginal bulge) No   Sensation of incomplete emptying Yes   Past use of pessary device No   If you answered yes to the previous question, please list the devices you have used below.     Bowel Symptoms: Are you experiencing any of the following?    Constipation No   Diarrhea  Yes   Hematochezia (bloody stool) No   Incomplete evacuation of stool " No   Involuntary loss of formed stool Yes   Fecal smearing/urgency No   Involuntary loss of gas No   Vaginal Symptoms: Are you experiencing any of the following?     Abnormal vaginal bleeding  No   Vaginal dryness No   Sexually active  No   Dyspareunia (painful intercourse) No   Estrogen use  No         GYN & OB History  No LMP recorded (lmp unknown). Patient has had a hysterectomy.   Date of Last Pap: No result found    OB History    Para Term  AB Living   2 2 2     2   SAB IAB Ectopic Multiple Live Births           2      # Outcome Date GA Lbr Gil/2nd Weight Sex Type Anes PTL Lv   2 Term         LIZETT   1 Term         LIZETT     OB     x 2.  C/s x 0.    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     Right Breast cancer          Past Surgical History:   Procedure Laterality Date    BREAST BIOPSY Right     BREAST LUMPECTOMY Right     2020    BREAST SURGERY Right         HYSTERECTOMY      MASTECTOMY, PARTIAL Right 2020    Procedure: MASTECTOMY, PARTIAL RIGHT WITH REFLECTOR;  Surgeon: Mike Haro MD;  Location: [facility] OR;  Service: General;  Laterality: Right;    OOPHORECTOMY      SENTINEL LYMPH NODE BIOPSY Right 2020    Procedure: BIOPSY, LYMPH NODE, SENTINEL RIGHT;  Surgeon: Mike Haro MD;  Location: [facility] OR;  Service: General;  Laterality: Right;    TRANSFORAMINAL EPIDURAL INJECTION OF STEROID Right 8/3/2023    Procedure: CERVICAL TRANSFORAMINAL Right C5/6 DIRECT REFERRAL;  Surgeon: Casandra Carballo MD;  Location: [facility] PAIN [facility]GT;  Service: Pain  Management;  Laterality: Right;       Review of Systems  Review of Systems   Constitutional:  Negative for chills and fever.   HENT:  Positive for sore throat.    Gastrointestinal:  Positive for diarrhea. Negative for abdominal pain, constipation, nausea and vomiting.   Genitourinary:  Positive for frequency and urgency. Negative for dysuria, flank pain, hematuria, pelvic pain and vaginal discharge.   Musculoskeletal:  Negative for back pain.   Skin:  Positive for rash.   Neurological:  Negative for headaches.           Objective:     Physical Exam   Constitutional: She is oriented to person, place, and time. She appears well-developed.   HENT:   Head: Normocephalic and atraumatic.   Eyes: Conjunctivae and EOM are normal.   Cardiovascular: Normal rate, regular rhythm, S1 normal, S2 normal, normal heart sounds and intact distal pulses.   Pulmonary/Chest: Effort normal and breath sounds normal. She exhibits no tenderness.   Abdominal: Soft. Bowel sounds are normal. She exhibits no distension and no mass. There is no splenomegaly or hepatomegaly. There is no abdominal tenderness. There is no rigidity, no rebound and no guarding. Hernia confirmed negative in the right inguinal area and confirmed negative in the left inguinal area.   Genitourinary: Pelvic exam was performed with patient supine. Rectum normal, vagina normal, skenes normal and bartholins normal. Right labia normal and left labia normal. Urethra exhibits hypermobility. Urethra exhibits no urethral caruncle, no urethral diverticulum and no urethral mass. Right bartholin is not enlarged and not tender. Left bartholin is not enlarged and not tender. Rectal exam shows resting tone normal and active tone normal. Rectal exam shows no external hemorrhoid, no fissure, no tenderness, anal tone normal and no dovetailing. Guaiac negative stool. There is a rectocele and atrophy in the vagina. No foreign body, tenderness, bleeding, unspecified prolapse of vaginal walls,  fistula, mesh exposure or lavator tenderness in the vagina. Right adnexum displays no tenderness. Left adnexum displays no tenderness. Uterus is absent.   PVR: 40 ML  Empty cough stress test: Negative.  Kegel: 2/5    POP-Q  Aa: -2 Ba: -2 C: -5   GH: 3 PB: 2 TVL: 8   Ap: 0 Bp: 0 D:                      Musculoskeletal:         General: Normal range of motion.      Cervical back: Normal range of motion and neck supple.   Neurological: She is alert and oriented to person, place, and time. She has normal strength, normal reflexes and intact cranial nerves (2-12). Cranial nerves II through XII intact. No cranial nerve deficit.   Skin: Skin is warm and dry.   Psychiatric: She has a normal mood and affect. Her speech is normal and behavior is normal. Judgment normal.               HCM  Pap's: No history of Abnormal paps's   Mammo: BIRADS: 2; Last imaging  2023    Colonoscopy: due 202023- declines - discussed Cologaurd   Dexa: Osteoporosis       Assessment:        1. Mixed incontinence urge and stress                 Plan:    1.  Mixed urinary incontinence, urge > stress:   --Bladder diary for 3-7 days,  not done   --Empty bladder every 3 hours.  Empty well: wait a minute, lean forward on toilet.    --Avoid dietary irritants (see sheet).  Keep diary x 3-5 days to determine your irritants.  --KEGELS: do 10 in AM and 10 in PM, holding each x 10 seconds.  When you feel urge to go, STOP, KEGEL, and when urge has passed, then go to bathroom. Pelvic floor PT.     --URGE: Tropsium 20 mg one in the morning and one at night  and add  Myrbetriq 25 mg in the morning.  SE profile reviewed.    Takes 2-4 weeks to see if will have effect.  For dry mouth: get sour, sugar free lozenge or gum.    --STRESS:  Non surgical options: Pessary vs. Impressa vs Rivive - which represent urethral and bladder support devices; Surgical options including 1.  mid urethral sling; retropubic, transobturator vs single incision 2. Fascial slings 3. Rahman  procedure 4. Periurethral bulking     2. --Nocturia (nighttime urination): stop fluids 2 hours before bed.  If have leg swelling:  Elevate feet above chest x 1 hour before bed to get excess fluid off.  Can also use support hose (knee highs).      3. Vaginal atrophy (dryness):  Use 1 gram of estrogen cream in vagina nightly x 2 weeks, then twice a week thereafter.      4. Prolapse: Stage 1  apical prolapse, Stage 2 posterior vaginal wall prolapse - asymptomatic   --Daily pelvic floor exercises as assigned,  Pelvic floor PT - declined     Thank you for requesting consultation of your patient.  I look forward to participating in her care.    Leann Leone DO  Female Pelvic Medicine and Reconstructive Surgery  [facility] [place], [place]

## 2024-03-21 NOTE — PATIENT INSTRUCTIONS
1.  Mixed urinary incontinence, urge > stress:   --Bladder diary for 3-7 days,  not done   --Empty bladder every 3 hours.  Empty well: wait a minute, lean forward on toilet.    --Avoid dietary irritants (see sheet).  Keep diary x 3-5 days to determine your irritants.  --KEGELS: do 10 in AM and 10 in PM, holding each x 10 seconds.  When you feel urge to go, STOP, KEGEL, and when urge has passed, then go to bathroom. Pelvic floor PT.     --URGE: Tropsium 20 mg one in the morning and one at night  and add  Myrbetriq 25 mg in the morning.  SE profile reviewed.    Takes 2-4 weeks to see if will have effect.  For dry mouth: get sour, sugar free lozenge or gum.    --STRESS:  Non surgical options: Pessary vs. Impressa vs Rivive - which represent urethral and bladder support devices; Surgical options including 1.  mid urethral sling; retropubic, transobturator vs single incision 2. Fascial slings 3. Rahman procedure 4. Periurethral bulking     2. --Nocturia (nighttime urination): stop fluids 2 hours before bed.  If have leg swelling:  Elevate feet above chest x 1 hour before bed to get excess fluid off.  Can also use support hose (knee highs).      3. Vaginal atrophy (dryness):  Use 1 gram of estrogen cream in vagina nightly x 2 weeks, then twice a week thereafter.      4. Prolapse: Stage 1  apical prolapse, Stage 2 posterior vaginal wall prolapse - asymptomatic   --Daily pelvic floor exercises as assigned,  Pelvic floor PT - declined

## 2024-04-04 DIAGNOSIS — M54.12 CERVICAL RADICULOPATHY: ICD-10-CM

## 2024-04-04 NOTE — TELEPHONE ENCOUNTER
Care Due:                  Date            Visit Type   Department     Provider  --------------------------------------------------------------------------------                                EP -                              PRIMARY      [facility]  Last Visit: 05-      CARE ([facility])   PRIMARY CARE   Dean Lowery  Next Visit: None Scheduled  None         None Found                                                            Last  Test          Frequency    Reason                     Performed    Due Date  --------------------------------------------------------------------------------    CBC.........  12 months..  meloxicam................  Not Found    Overdue    CMP.........  12 months..  alendronate,               Not Found    Overdue                             atorvastatin, meloxicam,                             valsartan-hydrochlorothia                             zide.....................    Lipid Panel.  12 months..  atorvastatin.............  Not Found    Overdue    Mg Level....  12 months..  alendronate..............  Not Found    Overdue    Phosphate...  12 months..  alendronate..............  Not Found    Overdue    Vitamin D...  12 months..  alendronate..............  Not Found    Overdue    [employer] Embedded Care Due Messages. Reference number: [id number].   4/04/2024 5:14:28 PM CDT

## 2024-04-05 RX ORDER — MELOXICAM 15 MG/1
15 TABLET ORAL
Qty: 30 TABLET | Refills: 0 | Status: SHIPPED | OUTPATIENT
Start: 2024-04-05

## 2024-04-05 NOTE — TELEPHONE ENCOUNTER
Refill Routing Note   Medication(s) are not appropriate for processing by [facility] for the following reason(s):        Outside of protocol    [facility] action(s):  Route             Appointments  past 12m or future 3m with PCP    Date Provider   Last Visit   5/30/2023 Dean Lowery MD   Next Visit   Visit date not found Dean Lowery MD   ED visits in past 90 days: 0        Note composed:8:41 AM 04/05/2024

## 2024-04-25 NOTE — TELEPHONE ENCOUNTER
"Patient here for a BP check, requesting something for sleep. Patient states "when I was here last time Dr. Lowery offered me something for sleep but I didn't want to take anything at the time, but I've been under a lot of stress lately. My son is ill and my daughter is going through a divorce." Patient has no trouble falling asleep, it's just staying asleep. She would like something called in. Please advise.   "
Is she taking trazodone?  
Patient says she tried the Trazodone but it does not work anymore.   
Prescription for lorazepam sent to the pharmacy, can use as needed  
Tried calling patient, no answer. Message left for her to return my call   
25-Apr-2024 10:49

## 2024-05-13 ENCOUNTER — OFFICE VISIT (OUTPATIENT)
Dept: PRIMARY CARE CLINIC | Facility: CLINIC | Age: 74
End: 2024-05-13
Payer: MEDICARE

## 2024-05-13 VITALS
DIASTOLIC BLOOD PRESSURE: 86 MMHG | WEIGHT: 217.13 LBS | RESPIRATION RATE: 18 BRPM | TEMPERATURE: 97 F | HEIGHT: 62 IN | HEART RATE: 73 BPM | SYSTOLIC BLOOD PRESSURE: 130 MMHG | BODY MASS INDEX: 39.96 KG/M2 | OXYGEN SATURATION: 98 %

## 2024-05-13 DIAGNOSIS — I10 ESSENTIAL HYPERTENSION: ICD-10-CM

## 2024-05-13 DIAGNOSIS — R73.01 FASTING HYPERGLYCEMIA: ICD-10-CM

## 2024-05-13 DIAGNOSIS — Z00.00 ANNUAL PHYSICAL EXAM: Primary | ICD-10-CM

## 2024-05-13 DIAGNOSIS — G81.01 FLACCID HEMIPLEGIA OF RIGHT DOMINANT SIDE DUE TO NONCEREBROVASCULAR ETIOLOGY: ICD-10-CM

## 2024-05-13 DIAGNOSIS — Z17.0 MALIGNANT NEOPLASM OF UPPER-INNER QUADRANT OF RIGHT BREAST IN FEMALE, ESTROGEN RECEPTOR POSITIVE: ICD-10-CM

## 2024-05-13 DIAGNOSIS — M19.011 PRIMARY OSTEOARTHRITIS OF RIGHT SHOULDER: ICD-10-CM

## 2024-05-13 DIAGNOSIS — C50.211 MALIGNANT NEOPLASM OF UPPER-INNER QUADRANT OF RIGHT BREAST IN FEMALE, ESTROGEN RECEPTOR POSITIVE: ICD-10-CM

## 2024-05-13 DIAGNOSIS — E66.01 SEVERE OBESITY (BMI 35.0-39.9) WITH COMORBIDITY: ICD-10-CM

## 2024-05-13 DIAGNOSIS — E78.5 HYPERLIPIDEMIA, UNSPECIFIED HYPERLIPIDEMIA TYPE: ICD-10-CM

## 2024-05-13 PROBLEM — J40 BRONCHITIS: Status: RESOLVED | Noted: 2021-11-09 | Resolved: 2024-05-13

## 2024-05-13 PROCEDURE — 1126F AMNT PAIN NOTED NONE PRSNT: CPT | Mod: CPTII,S$GLB,, | Performed by: FAMILY MEDICINE

## 2024-05-13 PROCEDURE — 3079F DIAST BP 80-89 MM HG: CPT | Mod: CPTII,S$GLB,, | Performed by: FAMILY MEDICINE

## 2024-05-13 PROCEDURE — 1101F PT FALLS ASSESS-DOCD LE1/YR: CPT | Mod: CPTII,S$GLB,, | Performed by: FAMILY MEDICINE

## 2024-05-13 PROCEDURE — 99999 PR PBB SHADOW E&M-EST. PATIENT-LVL IV: CPT | Mod: PBBFAC,,, | Performed by: FAMILY MEDICINE

## 2024-05-13 PROCEDURE — 99397 PER PM REEVAL EST PAT 65+ YR: CPT | Mod: 25,S$GLB,, | Performed by: FAMILY MEDICINE

## 2024-05-13 PROCEDURE — 1160F RVW MEDS BY RX/DR IN RCRD: CPT | Mod: CPTII,S$GLB,, | Performed by: FAMILY MEDICINE

## 2024-05-13 PROCEDURE — 3075F SYST BP GE 130 - 139MM HG: CPT | Mod: CPTII,S$GLB,, | Performed by: FAMILY MEDICINE

## 2024-05-13 PROCEDURE — 3008F BODY MASS INDEX DOCD: CPT | Mod: CPTII,S$GLB,, | Performed by: FAMILY MEDICINE

## 2024-05-13 PROCEDURE — 3288F FALL RISK ASSESSMENT DOCD: CPT | Mod: CPTII,S$GLB,, | Performed by: FAMILY MEDICINE

## 2024-05-13 PROCEDURE — 20610 DRAIN/INJ JOINT/BURSA W/O US: CPT | Mod: RT,S$GLB,, | Performed by: FAMILY MEDICINE

## 2024-05-13 PROCEDURE — 1159F MED LIST DOCD IN RCRD: CPT | Mod: CPTII,S$GLB,, | Performed by: FAMILY MEDICINE

## 2024-05-13 RX ORDER — LIDOCAINE HYDROCHLORIDE 10 MG/ML
2 INJECTION INFILTRATION; PERINEURAL
Status: DISCONTINUED | OUTPATIENT
Start: 2024-05-13 | End: 2024-05-13 | Stop reason: HOSPADM

## 2024-05-13 RX ORDER — METHYLPREDNISOLONE ACETATE 40 MG/ML
40 INJECTION, SUSPENSION INTRA-ARTICULAR; INTRALESIONAL; INTRAMUSCULAR; SOFT TISSUE
Status: DISCONTINUED | OUTPATIENT
Start: 2024-05-13 | End: 2024-05-13 | Stop reason: HOSPADM

## 2024-05-13 RX ADMIN — METHYLPREDNISOLONE ACETATE 40 MG: 40 INJECTION, SUSPENSION INTRA-ARTICULAR; INTRALESIONAL; INTRAMUSCULAR; SOFT TISSUE at 10:05

## 2024-05-13 RX ADMIN — LIDOCAINE HYDROCHLORIDE 2 ML: 10 INJECTION INFILTRATION; PERINEURAL at 10:05

## 2024-05-13 NOTE — PROCEDURES
Large Joint Aspiration/Injection: R glenohumeral    Date/Time: 5/13/2024 10:00 AM    Performed by: Dean Lowery MD  Authorized by: Dean Lowery MD    Consent Done?:  Yes (Verbal)  Indications:  Arthritis and pain  Site marked: the procedure site was marked    Timeout: prior to procedure the correct patient, procedure, and site was verified      Local anesthesia used?: Yes    Local anesthetic:  Topical anesthetic    Details:  Needle Size:  22 G  Ultrasonic Guidance for needle placement?: No    Approach:  Posterior  Location:  Shoulder  Site:  R glenohumeral  Medications:  2 mL LIDOcaine HCL 10 mg/ml (1%) 10 mg/mL (1 %); 40 mg methylPREDNISolone acetate 40 mg/mL  Patient tolerance:  Patient tolerated the procedure well with no immediate complications

## 2024-05-13 NOTE — PROGRESS NOTES
"Subjective:       Patient ID: Julissa Singletary is a 73 y.o. female.    Chief Complaint: Annual Exam     Here for annual checkup.  Main complaint at this time is recurrent/worsening right shoulder pain.  No recent injury.  Has gotten cortisone injections in the past which have lasted for about 6 months, last shot in October of last year      Review of Systems   Constitutional:  Negative for chills, fatigue and fever.   HENT:  Negative for congestion.    Eyes:  Negative for visual disturbance.   Respiratory:  Negative for cough and shortness of breath.    Cardiovascular:  Negative for chest pain.   Gastrointestinal:  Negative for abdominal pain, nausea and vomiting.   Genitourinary:  Negative for difficulty urinating.   Musculoskeletal:  Positive for arthralgias.   Skin:  Negative for rash.   Neurological:  Positive for weakness. Negative for dizziness.   Psychiatric/Behavioral:  Negative for sleep disturbance.        Objective:      Vitals:    05/13/24 1012   BP: 130/86   BP Location: Right arm   Patient Position: Sitting   BP Method: Large (Manual)   Pulse: 73   Resp: 18   Temp: 97.3 °F (36.3 °C)   TempSrc: Temporal   SpO2: 98%   Weight: 98.5 kg (217 lb 2.5 oz)   Height: 5' 2" (1.575 m)     BP Readings from Last 5 Encounters:   05/13/24 130/86   03/21/24 129/63   11/24/23 (!) 150/75   10/23/23 (!) 154/92   10/19/23 (!) 161/77     Wt Readings from Last 5 Encounters:   05/13/24 98.5 kg (217 lb 2.5 oz)   03/21/24 98.9 kg (218 lb 0.6 oz)   10/23/23 96.6 kg (212 lb 15.4 oz)   10/19/23 96.1 kg (211 lb 13.8 oz)   10/11/23 95.9 kg (211 lb 6.7 oz)     Physical Exam  Vitals and nursing note reviewed.   Constitutional:       General: She is not in acute distress.     Appearance: Normal appearance. She is well-developed.   HENT:      Head: Normocephalic and atraumatic.   Cardiovascular:      Rate and Rhythm: Normal rate and regular rhythm.      Heart sounds: Normal heart sounds.   Pulmonary:      Effort: Pulmonary effort " is normal.      Breath sounds: Normal breath sounds.   Musculoskeletal:      Right shoulder: Tenderness present. Decreased range of motion. Decreased strength.      Right lower leg: No edema.      Left lower leg: No edema.   Skin:     General: Skin is warm and dry.   Neurological:      Mental Status: She is alert and oriented to person, place, and time.      Motor: Weakness (right hemiplegia) present.   Psychiatric:         Mood and Affect: Mood normal.         Behavior: Behavior normal.         Lab Results   Component Value Date    WBC 6.00 03/18/2022    HGB 12.9 03/18/2022    HCT 39.8 03/18/2022     03/18/2022    CHOL 195 02/13/2023    TRIG 136 02/13/2023    HDL 64 02/13/2023    ALT 19 03/18/2022    AST 20 03/18/2022     03/18/2022    K 3.8 03/18/2022     03/18/2022    CREATININE 0.8 03/18/2022    BUN 15 03/18/2022    CO2 26 03/18/2022    TSH 4.1 (H) 12/06/2004    INR 1.0 12/15/2004      Assessment:       1. Annual physical exam    2. Essential hypertension    3. Hyperlipidemia, unspecified hyperlipidemia type    4. Severe obesity (BMI 35.0-39.9) with comorbidity    5. Malignant neoplasm of upper-inner quadrant of right breast in female, estrogen receptor positive    6. Flaccid hemiplegia of right dominant side due to noncerebrovascular etiology    7. Primary osteoarthritis of right shoulder    8. Fasting hyperglycemia        Plan:       Annual physical exam    Essential hypertension  -     CBC Auto Differential; Future; Expected date: 05/13/2024    Hyperlipidemia, unspecified hyperlipidemia type  -     Comprehensive Metabolic Panel; Future; Expected date: 05/13/2024  -     Lipid Panel; Future; Expected date: 05/13/2024    Severe obesity (BMI 35.0-39.9) with comorbidity   Lifestyle modification  Malignant neoplasm of upper-inner quadrant of right breast in female, estrogen receptor positive  Continue Arimidex, due for mammogram next month  Flaccid hemiplegia of right dominant side due to  noncerebrovascular etiology   Chronic, stable  Primary osteoarthritis of right shoulder  -     Large Joint Aspiration/Injection: R glenohumeral  -     LIDOcaine HCL 10 mg/ml (1%) injection 2 mL  -     methylPREDNISolone acetate injection 40 mg    Fasting hyperglycemia  -     Hemoglobin A1C; Future; Expected date: 05/13/2024      Medication List with Changes/Refills   Current Medications    ALENDRONATE (FOSAMAX) 70 MG TABLET    Take 1 tablet (70 mg total) by mouth every 7 days.    ANASTROZOLE (ARIMIDEX) 1 MG TAB    Take 1 tablet (1 mg total) by mouth once daily.    ATORVASTATIN (LIPITOR) 20 MG TABLET    Take one tablet by mouth once daily    CALCIUM CARB,GLUC/MAG OX,GLUC (CALCIUM MAGNESIUM ORAL)    Take by mouth.    CARVEDILOL (COREG) 6.25 MG TABLET    TAKE 1 TABLET (6.25 MG TOTAL) BY MOUTH 2 (TWO) TIMES A DAY.    ESCITALOPRAM OXALATE (LEXAPRO) 20 MG TABLET    Take 1 tablet by mouth once daily    FUROSEMIDE (LASIX) 20 MG TABLET    Take 20 mg by mouth.    GABAPENTIN (NEURONTIN) 300 MG CAPSULE    Take 1 capsule (300 mg total) by mouth every evening.    HYDROXYZINE HCL (ATARAX) 10 MG TAB    Take 1 tablet (10 mg total) by mouth 3 (three) times daily as needed (bladder pain).    MELATONIN 5 MG TAB    Take 1 tablet by mouth nightly as needed.    MELOXICAM (MOBIC) 15 MG TABLET    Take 1 tablet by mouth once daily    TROSPIUM (SANCTURA) 20 MG TAB TABLET    Take 1 tablet (20 mg total) by mouth 2 (two) times daily.    TURMERIC ORAL    Take 1 tablet by mouth once daily.    VALSARTAN-HYDROCHLOROTHIAZIDE (DIOVAN-HCT) 320-25 MG PER TABLET    TAKE 1 TABLET BY MOUTH ONCE DAILY.    ZINC ACETATE ORAL    Take by mouth.   Discontinued Medications    ALBUTEROL (VENTOLIN HFA) 90 MCG/ACTUATION INHALER    Inhale 2 puffs into the lungs every 4 (four) hours as needed for Wheezing or Shortness of Breath. Rescue    CALCIUM CARBONATE (OS-BEBE) 600 MG CALCIUM (1,500 MG) TAB    Take 600 mg by mouth once.    ESTRADIOL (ESTRACE) 0.01 % (0.1 MG/GRAM)  VAGINAL CREAM    Place 1 g vaginally twice a week.    MIRABEGRON (MYRBETRIQ) 25 MG TB24 ER TABLET    Take 1 tablet (25 mg total) by mouth once daily.    NYSTATIN (MYCOSTATIN) POWDER    Apply topically 3 (three) times daily. Apply under breasts to rash    TIZANIDINE (ZANAFLEX) 4 MG TABLET    Take 1 tablet (4 mg total) by mouth 3 (three) times daily as needed (muscle spasm).    TRIAMCINOLONE ACETONIDE 0.1% (KENALOG) 0.1 % OINTMENT    Apply topically 2 (two) times daily.

## 2024-05-14 DIAGNOSIS — I10 ESSENTIAL HYPERTENSION: ICD-10-CM

## 2024-05-14 RX ORDER — FUROSEMIDE 20 MG/1
20 TABLET ORAL
Status: CANCELLED | OUTPATIENT
Start: 2024-05-14

## 2024-05-14 RX ORDER — VALSARTAN AND HYDROCHLOROTHIAZIDE 320; 25 MG/1; MG/1
1 TABLET, FILM COATED ORAL DAILY
Qty: 90 TABLET | Refills: 3 | Status: SHIPPED | OUTPATIENT
Start: 2024-05-14

## 2024-05-14 NOTE — TELEPHONE ENCOUNTER
No care due was identified.  [facility] Embedded Care Due Messages. Reference number: [id number].   5/14/2024 1:48:12 PM CDT

## 2024-05-14 NOTE — TELEPHONE ENCOUNTER
----- Message from Ekta Gupta sent at 5/14/2024 12:15 PM CDT -----  Contact: [phone]  Requesting an RX refill or new RX.  Is this a refill or new RX: refill  RX name and strength (copy/paste from chart):  valsartan-hydrochlorothiazide (DIOVAN-HCT) 320-25 mg per tablet   Is this a 30 day or 90 day RX: 90  Pharmacy name and phone # (copy/paste from chart):    [facility] [place] - [address]  [address] [place] [address]  Phone: [phone] Fax: [phone]  The doctors have asked that we provide their patients with the following 2 reminders -- prescription refills can take up to 72 hours, and a friendly reminder that in the future you can use your [facility] account to request refills: yes    Requesting an RX refill or new RX.  Is this a refill or new RX: refill  RX name and strength (copy/paste from chart):  furosemide (LASIX) 20 MG tablet   Is this a 30 day or 90 day RX: 90  Pharmacy name and phone # (copy/paste from chart):    [facility] [place] - [address]  [address] [place] [address]  Phone: [phone] Fax: [phone]  The doctors have asked that we provide their patients with the following 2 reminders -- prescription refills can take up to 72 hours, and a friendly reminder that in the future you can use your [facility] account to request refills: yes    Pharmacy is telling pt they have been trying for two weeks to get her medication filled but they have not had a response. Pt was seen for an office visit yesterday. Please call pt when completed. Thanks

## 2024-05-14 NOTE — TELEPHONE ENCOUNTER
Refill Routing Note   Medication(s) are not appropriate for processing by [facility] for the following reason(s):        No active prescription written by provider    [facility] action(s):  Approve  Defer      Medication Therapy Plan: historical rx      Appointments  past 12m or future 3m with PCP    Date Provider   Last Visit   5/13/2024 Dean Lowery MD   Next Visit   Visit date not found Dean Lowery MD   ED visits in past 90 days: 0        Note composed:3:11 PM 05/14/2024

## 2024-05-15 RX ORDER — FUROSEMIDE 20 MG/1
20 TABLET ORAL DAILY
Qty: 90 TABLET | Refills: 1 | Status: SHIPPED | OUTPATIENT
Start: 2024-05-15

## 2024-05-15 NOTE — TELEPHONE ENCOUNTER
No care due was identified.  [facility] Embedded Care Due Messages. Reference number: [id number].   5/15/2024 3:44:03 PM CDT

## 2024-05-15 NOTE — TELEPHONE ENCOUNTER
Called for pt, no answer. LM on VM to return call    Called to clarify if patient actually takes furosemide and, if so, how she takes it.

## 2024-05-15 NOTE — TELEPHONE ENCOUNTER
----- Message from Mia Mcconnell sent at 5/15/2024  3:19 PM CDT -----  Contact: [phone] Patient  Patient is returning a phone call.  Who left a message for the patient: Jayne Henley MA  Does patient know what this is regarding:    Would you like a call back, or a response through your [facility] portal?:   Call Back Please. Thank you  Comments:

## 2024-05-20 ENCOUNTER — TELEPHONE (OUTPATIENT)
Dept: UROGYNECOLOGY | Facility: CLINIC | Age: 74
End: 2024-05-20
Payer: MEDICARE

## 2024-05-20 DIAGNOSIS — Z17.0 MALIGNANT NEOPLASM OF UPPER-INNER QUADRANT OF RIGHT BREAST IN FEMALE, ESTROGEN RECEPTOR POSITIVE: ICD-10-CM

## 2024-05-20 DIAGNOSIS — C50.211 MALIGNANT NEOPLASM OF UPPER-INNER QUADRANT OF RIGHT BREAST IN FEMALE, ESTROGEN RECEPTOR POSITIVE: ICD-10-CM

## 2024-05-20 NOTE — TELEPHONE ENCOUNTER
"Returned patients phone call- patient states "someone" called her today and told her she needs to be seen and needs to make an appointment. Informed patient  will contact patient to make appointment. Patient verbalized understanding. Call ended    ----- Message from Scarlett Anderson sent at 5/20/2024  3:20 PM CDT -----  Name of Who is calling :JEISON MCALLISTER [[id number]]        What is the request in detail:Pt would like a return call back . Please assist         Can the clinic reply by [facility]:  No           What [facility]:[phone]  "

## 2024-05-21 RX ORDER — ANASTROZOLE 1 MG/1
1 TABLET ORAL
Qty: 90 TABLET | Refills: 3 | Status: SHIPPED | OUTPATIENT
Start: 2024-05-21

## 2024-06-17 ENCOUNTER — HOSPITAL ENCOUNTER (OUTPATIENT)
Dept: RADIOLOGY | Facility: HOSPITAL | Age: 74
Discharge: HOME | End: 2024-06-17
Attending: NURSE PRACTITIONER
Payer: MEDICARE

## 2024-06-17 DIAGNOSIS — Z17.0 MALIGNANT NEOPLASM OF UPPER-INNER QUADRANT OF RIGHT BREAST IN FEMALE, ESTROGEN RECEPTOR POSITIVE: ICD-10-CM

## 2024-06-17 DIAGNOSIS — Z12.31 ENCOUNTER FOR SCREENING MAMMOGRAM FOR MALIGNANT NEOPLASM OF BREAST: ICD-10-CM

## 2024-06-17 DIAGNOSIS — C50.211 MALIGNANT NEOPLASM OF UPPER-INNER QUADRANT OF RIGHT BREAST IN FEMALE, ESTROGEN RECEPTOR POSITIVE: ICD-10-CM

## 2024-06-17 PROCEDURE — 77063 BREAST TOMOSYNTHESIS BI: CPT | Mod: 26,,, | Performed by: RADIOLOGY

## 2024-06-17 PROCEDURE — 77067 SCR MAMMO BI INCL CAD: CPT | Mod: TC

## 2024-06-17 PROCEDURE — 77067 SCR MAMMO BI INCL CAD: CPT | Mod: 26,,, | Performed by: RADIOLOGY

## 2024-06-18 NOTE — PROGRESS NOTES
Please call patient and let her know that her recent mammogram is normal.  She is due for a follow up with Dr. Humphreys in clinic as July will make a year since she has been seen in clinic.  Can you please help her schedule a follow up as well.  Thank you.    Marsha

## 2024-06-19 ENCOUNTER — TELEPHONE (OUTPATIENT)
Dept: HEMATOLOGY/ONCOLOGY | Facility: CLINIC | Age: 74
End: 2024-06-19
Payer: MEDICARE

## 2024-06-19 NOTE — TELEPHONE ENCOUNTER
Informed patient mammo normal and offered to schedule follow up with dr humphreys. Patient stated she will callback to schedule office visit.       ----- Message from Marsha Pelletier NP sent at 6/18/2024  3:35 PM CDT -----  Please call patient and let her know that her recent mammogram is normal.  She is due for a follow up with Dr. Humphreys in clinic as July will make a year since she has been seen in clinic.  Can you please help her schedule a follow up as well.  Thank you.    Marsha

## 2024-07-09 ENCOUNTER — OFFICE VISIT (OUTPATIENT)
Dept: ORTHOPEDICS | Facility: CLINIC | Age: 74
End: 2024-07-09
Payer: MEDICARE

## 2024-07-09 DIAGNOSIS — M19.011 OSTEOARTHRITIS, LOCALIZED, SHOULDER, RIGHT: Primary | ICD-10-CM

## 2024-07-09 PROCEDURE — 99999 PR PBB SHADOW E&M-EST. PATIENT-LVL III: CPT | Mod: PBBFAC,,, | Performed by: PHYSICIAN ASSISTANT

## 2024-07-09 RX ORDER — TRIAMCINOLONE ACETONIDE 40 MG/ML
40 INJECTION, SUSPENSION INTRA-ARTICULAR; INTRAMUSCULAR
Status: DISCONTINUED | OUTPATIENT
Start: 2024-07-09 | End: 2024-07-09 | Stop reason: HOSPADM

## 2024-07-09 RX ORDER — LIDOCAINE HYDROCHLORIDE 10 MG/ML
2 INJECTION INFILTRATION; PERINEURAL
Status: DISCONTINUED | OUTPATIENT
Start: 2024-07-09 | End: 2024-07-09 | Stop reason: HOSPADM

## 2024-07-09 RX ADMIN — TRIAMCINOLONE ACETONIDE 40 MG: 40 INJECTION, SUSPENSION INTRA-ARTICULAR; INTRAMUSCULAR at 09:07

## 2024-07-09 RX ADMIN — LIDOCAINE HYDROCHLORIDE 2 ML: 10 INJECTION INFILTRATION; PERINEURAL at 09:07

## 2024-07-09 NOTE — PROGRESS NOTES
Patient ID: Julissa Singletary is a 73 y.o. female.    Chief Complaint: Pain of the Right Shoulder      HISTORY:  Julissa Singletary is a 73 y.o. female who returns to me today for follow up of right shoulder pain.  She was last seen by me 10/11/2023.  She had very good relief with the steroid injection at her last visit.  She is requesting a repeat injection today.      PMH/PSH/FamHx/SocHx:    Unchanged from prior visit.    ROS:  Constitution: Negative for chills, fever and weakness.   Respiratory: Negative for cough and shortness of breath.   Musculoskeletal: Positive for right shoulder pain  Psychiatric/Behavioral: The patient is not nervous/anxious.       PHYSICAL EXAM:   Right shoulder  Skin intact  No warmth or effusion  ROM is limited and painful    ASSESSMENT/PLAN:    Julissa was seen today for pain.    Diagnoses and all orders for this visit:    Osteoarthritis, localized, shoulder, right  -     Large Joint Aspiration/Injection: R glenohumeral      - right shoulder CSI today  - Rest, ice as needed  - Follow up if symptoms worsen or fail to improve

## 2024-07-09 NOTE — PROCEDURES
Large Joint Aspiration/Injection: R glenohumeral    Date/Time: 7/9/2024 9:45 AM    Performed by: Natalya Carranza PA-C  Authorized by: Natalya Carranza PA-C    Consent Done?:  Yes (Verbal)  Indications:  Pain  Timeout: prior to procedure the correct patient, procedure, and site was verified    Prep: patient was prepped and draped in usual sterile fashion    Local anesthetic:  Topical anesthetic    Details:  Needle Size:  22 G  Approach:  Posterior  Location:  Shoulder  Site:  R glenohumeral  Medications:  40 mg triamcinolone acetonide 40 mg/mL; 2 mL LIDOcaine HCL 10 mg/ml (1%) 10 mg/mL (1 %)  Patient tolerance:  Patient tolerated the procedure well with no immediate complications

## 2024-07-17 RX ORDER — MIRABEGRON 25 MG/1
25 TABLET, FILM COATED, EXTENDED RELEASE ORAL DAILY
Qty: 90 TABLET | Refills: 3 | Status: SHIPPED | OUTPATIENT
Start: 2024-07-17 | End: 2025-07-17

## 2024-07-17 NOTE — TELEPHONE ENCOUNTER
Called pt regarding message. Pt stated Myrbetriq 25 MG  was discontinued. Informed pt medication was discontinued on 05/13 by MA. The reason is pt reported no longer taking. Informed pt medication can be put back on chart. Pt verbalized understanding and requested refill.

## 2024-07-17 NOTE — TELEPHONE ENCOUNTER
----- Message from Elisabet Higginbotham sent at 7/17/2024  8:41 AM CDT -----  Contact: [phone]  Per pt, she was told by her Pharmacy that the Myrbetriq 25 MG  medication was stopped. Pt would like to find out why? Please advise.       Pt is using   [facility] - [place] - [address]  [address] [place] [address]  Phone: [phone] Fax: [phone]                Thank you

## 2024-07-17 NOTE — TELEPHONE ENCOUNTER
Care Due:                  Date            Visit Type   Department     Provider  --------------------------------------------------------------------------------                                EP -                              PRIMARY      [facility]  Last Visit: 05-      CARE ([facility])   PRIMARY CARE   Dean Lowery  Next Visit: None Scheduled  None         None Found                                                            Last  Test          Frequency    Reason                     Performed    Due Date  --------------------------------------------------------------------------------    Mg Level....  12 months..  alendronate..............  Not Found    Overdue    Phosphate...  12 months..  alendronate..............  Not Found    Overdue    Vitamin D...  12 months..  alendronate..............  Not Found    Overdue    [employer] Embedded Care Due Messages. Reference number: [id number].   7/17/2024 9:08:46 AM CDT

## 2024-08-22 ENCOUNTER — OFFICE VISIT (OUTPATIENT)
Dept: UROGYNECOLOGY | Facility: CLINIC | Age: 74
End: 2024-08-22
Payer: MEDICARE

## 2024-08-22 VITALS
DIASTOLIC BLOOD PRESSURE: 74 MMHG | WEIGHT: 214.31 LBS | HEART RATE: 76 BPM | SYSTOLIC BLOOD PRESSURE: 126 MMHG | HEIGHT: 62 IN | BODY MASS INDEX: 39.44 KG/M2

## 2024-08-22 DIAGNOSIS — N95.2 ATROPHIC VAGINITIS: ICD-10-CM

## 2024-08-22 DIAGNOSIS — N39.46 MIXED URGE AND STRESS INCONTINENCE: Primary | ICD-10-CM

## 2024-08-22 DIAGNOSIS — N81.6 POSTERIOR VAGINAL WALL PROLAPSE: ICD-10-CM

## 2024-08-22 PROCEDURE — 1159F MED LIST DOCD IN RCRD: CPT | Mod: CPTII,S$GLB,, | Performed by: OBSTETRICS & GYNECOLOGY

## 2024-08-22 PROCEDURE — 3044F HG A1C LEVEL LT 7.0%: CPT | Mod: CPTII,S$GLB,, | Performed by: OBSTETRICS & GYNECOLOGY

## 2024-08-22 PROCEDURE — 3078F DIAST BP <80 MM HG: CPT | Mod: CPTII,S$GLB,, | Performed by: OBSTETRICS & GYNECOLOGY

## 2024-08-22 PROCEDURE — 3288F FALL RISK ASSESSMENT DOCD: CPT | Mod: CPTII,S$GLB,, | Performed by: OBSTETRICS & GYNECOLOGY

## 2024-08-22 PROCEDURE — 99999 PR PBB SHADOW E&M-EST. PATIENT-LVL IV: CPT | Mod: PBBFAC,,, | Performed by: OBSTETRICS & GYNECOLOGY

## 2024-08-22 PROCEDURE — 99213 OFFICE O/P EST LOW 20 MIN: CPT | Mod: S$GLB,,, | Performed by: OBSTETRICS & GYNECOLOGY

## 2024-08-22 PROCEDURE — 1126F AMNT PAIN NOTED NONE PRSNT: CPT | Mod: CPTII,S$GLB,, | Performed by: OBSTETRICS & GYNECOLOGY

## 2024-08-22 PROCEDURE — 3008F BODY MASS INDEX DOCD: CPT | Mod: CPTII,S$GLB,, | Performed by: OBSTETRICS & GYNECOLOGY

## 2024-08-22 PROCEDURE — 1100F PTFALLS ASSESS-DOCD GE2>/YR: CPT | Mod: CPTII,S$GLB,, | Performed by: OBSTETRICS & GYNECOLOGY

## 2024-08-22 PROCEDURE — 3074F SYST BP LT 130 MM HG: CPT | Mod: CPTII,S$GLB,, | Performed by: OBSTETRICS & GYNECOLOGY

## 2024-08-22 NOTE — PROGRESS NOTES
Subjective:       Patient ID: Julissa Singletary is a 73 y.o. female.    Chief Complaint:  Follow-up        History of Present Illness   73 y.o.  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  Referred by Dr. Lowery for evaluation of incontinence. She has been wearing pads for six months. She wakes up 3-4 at night with leakage. She wear a depends at night and pads during the day. She has bothersome urinary urgency. 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     3/21/2024  Patient here for follow up   Tropism twice a day is helping     2024:  Patient doing well with   Dual therapy Tropsium bid and Myrbetriq daily  Symptoms of urgency and frequency much improved       [facility]    Question 10/23/2023  2:39 PM CDT - Filed by Carin Segura MA   General Urogynecology: Are you experiencing the following?    Dysuria (painful urination) No   Nocturia:  waking up at night to empty your bladder  Yes   If you answered yes to the previous question, how many times does this happen per night? 3-4   Enuresis (urine loss during sleep) Yes   Dribbling urine after you urinate Yes   Hematuria (urine appears red) No   Type of stream Weak   Urinary frequency: How often a day are you going to the bathroom per day?  10-15   Urinary Tract Infections: How many Urinary Tract Infections have you had in the past year? I have not had a UTI in the past year   If you have had a UTI in the past year, what treatments have you had so far?  I have not had a UTI in the past year   Urinary Incontinence (General): Are you experiencing the following?    Past consultation for incontinence: Have you ever seen someone for the evaluation of incontinence? No   If you answered yes to the previous question, please select all the therapies you have tried.  N/a- I answered no to the previous question  "  Please note the effectiveness of the therapies.    Need to wear protection to keep clothes dry  Yes   If you answered yes to the previous question, please aimee the protection you use.  Pads   If you wear protection, how much wetness is typically on each pad? Soaked   If you wear protection, how often do you have to change per day, if applicable?     Stress Symptoms: Are you experiencing the following?    Leakage of urine with cough, laugh and/or sneeze Yes   If you answered yes to the previous question, what is the frequency in days, weeks and/or months? Several times a week   Leakage of urine with sex No   Leakage of urine with bending/ lifting Yes   Leakage of urine with briskly walking or jogging Yes   If you lose urine for any other reason not previously mentioned, please note it below, if applicable.     Urge Symptoms: Are you experiencing the following?    Urgency ("got to go" feeling) No   Urge: How frequently do you feel an urge to urinate (feeling like you "gotta go" to the bathroom and can't wait) Never   Do you experience a leakage of urine when you have a feeling of urgency?  No   Leakage of urine when unaware Yes   Past use of anticholinergics (medications used to treat overactive bladder) No   If you answered yes to the previous question, please aimee the anticholinergics you have used:     Have you ever used Mirbetriq (aka Mirabegron)?  No   Prolapse Symptoms: Are you experiencing any of the following?     Falling out/ Bulging/ Heaviness in the vagina No   Vaginal/ Abdominal Pain/ Pressure No   Need to strain/ Push to void No   Need to wait on the toilet before you void No   Unusual position to urinate (using your hands to push back the vaginal bulge) No   Sensation of incomplete emptying Yes   Past use of pessary device No   If you answered yes to the previous question, please list the devices you have used below.     Bowel Symptoms: Are you experiencing any of the following?    Constipation No "   Diarrhea  Yes   Hematochezia (bloody stool) No   Incomplete evacuation of stool No   Involuntary loss of formed stool Yes   Fecal smearing/urgency No   Involuntary loss of gas No   Vaginal Symptoms: Are you experiencing any of the following?     Abnormal vaginal bleeding  No   Vaginal dryness No   Sexually active  No   Dyspareunia (painful intercourse) No   Estrogen use  No         GYN & OB History  No LMP recorded (lmp unknown). Patient has had a hysterectomy.   Date of Last Pap: No result found    OB History    Para Term  AB Living   2 2 2     2   SAB IAB Ectopic Multiple Live Births           2      # Outcome Date GA Lbr Gil/2nd Weight Sex Type Anes PTL Lv   2 Term         LIZETT   1 Term         LIZETT     OB     x 2.  C/s x 0.    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     Right Breast cancer          Past Surgical History:   Procedure Laterality Date    BREAST BIOPSY Right     BREAST LUMPECTOMY Right     2020    BREAST SURGERY Right         HYSTERECTOMY      MASTECTOMY, PARTIAL Right 2020    Procedure: MASTECTOMY, PARTIAL RIGHT WITH REFLECTOR;  Surgeon: Mike Haro MD;  Location: [facility] OR;  Service: General;  Laterality: Right;    OOPHORECTOMY      SENTINEL LYMPH NODE BIOPSY Right 2020    Procedure: BIOPSY, LYMPH NODE, SENTINEL RIGHT;  Surgeon: Mike Haro MD;  Location: [facility] OR;  Service: General;  Laterality: Right;    TRANSFORAMINAL EPIDURAL INJECTION OF STEROID Right 8/3/2023    Procedure: CERVICAL TRANSFORAMINAL Right C5/6  DIRECT REFERRAL;  Surgeon: Casandra Carballo MD;  [facility];  Service: Pain Management;  Laterality: Right;       Review of Systems  Review of Systems   Constitutional:  Negative for chills and fever.   HENT:  Positive for sore throat.    Gastrointestinal:  Positive for diarrhea. Negative for abdominal pain, constipation, nausea and vomiting.   Genitourinary:  Positive for frequency and urgency. Negative for dysuria, flank pain, hematuria, pelvic pain and vaginal discharge.   Musculoskeletal:  Negative for back pain.   Skin:  Positive for rash.   Neurological:  Negative for headaches.           Objective:     Physical Exam   Constitutional: She is oriented to person, place, and time. She appears well-developed.   HENT:   Head: Normocephalic and atraumatic.   Eyes: Conjunctivae and EOM are normal.   Cardiovascular: Normal rate, regular rhythm, S1 normal, S2 normal, normal heart sounds and intact distal pulses.   Pulmonary/Chest: Effort normal and breath sounds normal. She exhibits no tenderness.   Abdominal: Soft. Bowel sounds are normal. She exhibits no distension and no mass. There is no splenomegaly or hepatomegaly. There is no abdominal tenderness. There is no rigidity, no rebound and no guarding. Hernia confirmed negative in the right inguinal area and confirmed negative in the left inguinal area.   Genitourinary: Pelvic exam was performed with patient supine. Rectum normal, vagina normal, skenes normal and bartholins normal. Right labia normal and left labia normal. Urethra exhibits hypermobility. Urethra exhibits no urethral caruncle, no urethral diverticulum and no urethral mass. Right bartholin is not enlarged and not tender. Left bartholin is not enlarged and not tender. Rectal exam shows resting tone normal and active tone normal. Rectal exam shows no external hemorrhoid, no fissure, no tenderness, anal tone normal and no dovetailing. Guaiac negative stool. There is a rectocele and atrophy in the  vagina. No foreign body, tenderness, bleeding, unspecified prolapse of vaginal walls, fistula, mesh exposure or lavator tenderness in the vagina. Right adnexum displays no tenderness. Left adnexum displays no tenderness. Uterus is absent.   PVR: 40 ML  Empty cough stress test: Negative.  Kegel: 2/5    POP-Q  Aa: -2 Ba: -2 C: -5   GH: 3 PB: 2 TVL: 8   Ap: 0 Bp: 0 D:                      Musculoskeletal:         General: Normal range of motion.      Cervical back: Normal range of motion and neck supple.   Neurological: She is alert and oriented to person, place, and time. She has normal strength, normal reflexes and intact cranial nerves (2-12). Cranial nerves II through XII intact. No cranial nerve deficit.   Skin: Skin is warm and dry.   Psychiatric: She has a normal mood and affect. Her speech is normal and behavior is normal. Judgment normal.               HCM  Pap's: No history of Abnormal paps's   Mammo: BIRADS: 2; Last imaging  2023    Colonoscopy: due 202023- declines - discussed Cologaurd   Dexa: Osteoporosis       Assessment:        1. Mixed urge and stress incontinence    2. Atrophic vaginitis    3. Posterior vaginal wall prolapse                   Plan:    1.  Mixed urinary incontinence, urge > stress: much improved   --Bladder diary for 3-7 days,  not done   --Empty bladder every 3 hours.  Empty well: wait a minute, lean forward on toilet.    --Avoid dietary irritants (see sheet).  Keep diary x 3-5 days to determine your irritants.  --KEGELS: do 10 in AM and 10 in PM, holding each x 10 seconds.  When you feel urge to go, STOP, KEGEL, and when urge has passed, then go to bathroom. Pelvic floor PT.     --URGE: Continue tropsium 20 mg one in the morning and one at night  and  Myrbetriq 25 mg in the morning.  SE profile reviewed.    Takes 2-4 weeks to see if will have effect.  For dry mouth: get sour, sugar free lozenge or gum.    --STRESS:  Non surgical options: Pessary vs. Impressa vs Rivive - which  represent urethral and bladder support devices; Surgical options including 1.  mid urethral sling; retropubic, transobturator vs single incision 2. Fascial slings 3. Rahman procedure 4. Periurethral bulking     2. --Nocturia (nighttime urination): stop fluids 2 hours before bed.  If have leg swelling:  Elevate feet above chest x 1 hour before bed to get excess fluid off.  Can also use support hose (knee highs).      3. Vaginal atrophy (dryness):  Use 1 gram of estrogen cream in vagina  twice a week     4. Prolapse: Stage 1  apical prolapse, Stage 2 posterior vaginal wall prolapse - asymptomatic   --Daily pelvic floor exercises as assigned,  Pelvic floor PT - declined     Leann Leone DO  Female Pelvic Medicine and Reconstructive Surgery  [facility] [place], [place]

## 2024-09-01 DIAGNOSIS — M54.12 CERVICAL RADICULOPATHY: ICD-10-CM

## 2024-09-02 RX ORDER — GABAPENTIN 300 MG/1
300 CAPSULE ORAL
Qty: 30 CAPSULE | Refills: 0 | OUTPATIENT
Start: 2024-09-02

## 2024-09-04 ENCOUNTER — TELEPHONE (OUTPATIENT)
Dept: PRIMARY CARE CLINIC | Facility: CLINIC | Age: 74
End: 2024-09-04
Payer: MEDICARE

## 2024-09-04 NOTE — TELEPHONE ENCOUNTER
----- Message from Dee Kaba sent at 9/4/2024  9:25 AM CDT -----  Contact: Patient, [phone]  [id number]     Patient is calling for Medical Advice regarding: Right shoulder is stiff.    How long has patient had these symptoms: Forever    Pharmacy name and phone#:   [facility] - [place] - [address]  [address] [place] [address]  Phone: [phone] Fax: [phone]    Patient wants a call back or thru [facility]: Call back    Comments: Calling to get an injection in her shoulder. Please call her. Thanks.    Please advise patient replies from provider may take up to 48 hours.

## 2024-09-04 NOTE — TELEPHONE ENCOUNTER
Called pt regarding message.Pt asked the name of ortho she seen in the past. Informed pt she has seen Natalya Carranza.

## 2024-09-17 DIAGNOSIS — I10 ESSENTIAL HYPERTENSION: ICD-10-CM

## 2024-09-17 DIAGNOSIS — M54.12 CERVICAL RADICULOPATHY: ICD-10-CM

## 2024-09-17 NOTE — TELEPHONE ENCOUNTER
Care Due:                  Date            Visit Type   Department     Provider  --------------------------------------------------------------------------------                                EP -                              PRIMARY      [facility]  Last Visit: 05-      CARE ([facility])   PRIMARY CARE   Dean Lowery  Next Visit: None Scheduled  None         None Found                                                            Last  Test          Frequency    Reason                     Performed    Due Date  --------------------------------------------------------------------------------    Mg Level....  12 months..  alendronate..............  Not Found    Overdue    Phosphate...  12 months..  alendronate..............  Not Found    Overdue    [employer] Embedded Care Due Messages. Reference number: [id number].   9/17/2024 2:39:31 PM CDT

## 2024-09-18 RX ORDER — MELOXICAM 15 MG/1
15 TABLET ORAL
Qty: 90 TABLET | Refills: 3 | Status: SHIPPED | OUTPATIENT
Start: 2024-09-18

## 2024-09-18 RX ORDER — ATORVASTATIN CALCIUM 20 MG/1
20 TABLET, FILM COATED ORAL
Qty: 90 TABLET | Refills: 2 | Status: SHIPPED | OUTPATIENT
Start: 2024-09-18

## 2024-09-18 RX ORDER — CARVEDILOL 6.25 MG/1
6.25 TABLET ORAL 2 TIMES DAILY
Qty: 180 TABLET | Refills: 2 | Status: SHIPPED | OUTPATIENT
Start: 2024-09-18

## 2024-09-18 NOTE — TELEPHONE ENCOUNTER
Refill Routing Note   Medication(s) are not appropriate for processing by [facility] for the following reason(s):        Outside of protocol    [facility] action(s):  Approve  Route  Defer   Requires labs : Yes           Pharmacist review requested: Yes     Appointments  past 12m or future 3m with PCP    Date Provider   Last Visit   5/13/2024 Dean Lowery MD   Next Visit   Visit date not found Dean Lowery MD   ED visits in past 90 days: 0        Note composed:2:06 PM 09/18/2024

## 2024-09-18 NOTE — TELEPHONE ENCOUNTER
Refill Routing Note   Medication(s) are not appropriate for processing by [facility] for the following reason(s):        Outside of protocol  Drug-disease interaction    [facility] action(s):  Approve  Route  Defer   Requires labs : Yes      Medication Therapy Plan: Drug-Disease: carvediloL and Mild persistent asthma    Pharmacist review requested: Yes     Appointments  past 12m or future 3m with PCP    Date Provider   Last Visit   5/13/2024 Dean Lowery MD   Next Visit   Visit date not found Dean Lowery MD   ED visits in past 90 days: 0        Note composed:1:14 PM 09/18/2024

## 2024-09-18 NOTE — TELEPHONE ENCOUNTER
----- Message from Sheela Anderson sent at 9/18/2024  2:09 PM CDT -----  Contact: [employer]  [phone]  Patient said that she saw a medication on the TV that treats Osteoporosis and she would like to speak with you about this.

## 2024-09-26 ENCOUNTER — OFFICE VISIT (OUTPATIENT)
Dept: ORTHOPEDICS | Facility: CLINIC | Age: 74
End: 2024-09-26
Payer: MEDICARE

## 2024-09-26 DIAGNOSIS — M19.011 OSTEOARTHRITIS, LOCALIZED, SHOULDER, RIGHT: Primary | ICD-10-CM

## 2024-09-26 PROCEDURE — 99999 PR PBB SHADOW E&M-EST. PATIENT-LVL III: CPT | Mod: PBBFAC,,, | Performed by: PHYSICIAN ASSISTANT

## 2024-09-26 RX ORDER — TRIAMCINOLONE ACETONIDE 40 MG/ML
40 INJECTION, SUSPENSION INTRA-ARTICULAR; INTRAMUSCULAR
Status: DISCONTINUED | OUTPATIENT
Start: 2024-09-26 | End: 2024-09-26 | Stop reason: HOSPADM

## 2024-09-26 RX ORDER — LIDOCAINE HYDROCHLORIDE 10 MG/ML
2 INJECTION, SOLUTION INFILTRATION; PERINEURAL
Status: DISCONTINUED | OUTPATIENT
Start: 2024-09-26 | End: 2024-09-26 | Stop reason: HOSPADM

## 2024-09-26 RX ADMIN — TRIAMCINOLONE ACETONIDE 40 MG: 40 INJECTION, SUSPENSION INTRA-ARTICULAR; INTRAMUSCULAR at 01:09

## 2024-09-26 RX ADMIN — LIDOCAINE HYDROCHLORIDE 2 ML: 10 INJECTION, SOLUTION INFILTRATION; PERINEURAL at 01:09

## 2024-09-26 NOTE — PROCEDURES
Large Joint Aspiration/Injection: R subacromial bursa    Date/Time: 9/26/2024 1:30 PM    Performed by: Natalya Carranza PA-C  Authorized by: Natalya Carranza PA-C    Consent Done?:  Yes (Verbal)  Indications:  Pain  Timeout: prior to procedure the correct patient, procedure, and site was verified    Prep: patient was prepped and draped in usual sterile fashion    Local anesthetic:  Topical anesthetic    Details:  Needle Size:  22 G  Approach:  Posterior  Location:  Shoulder  Site:  R subacromial bursa  Medications:  40 mg triamcinolone acetonide 40 mg/mL; 2 mL LIDOcaine HCL 10 mg/ml (1%) 10 mg/mL (1 %)  Patient tolerance:  Patient tolerated the procedure well with no immediate complications

## 2024-09-26 NOTE — PROGRESS NOTES
Patient ID: Julissa Singletary is a 73 y.o. female.    HISTORY:  Julissa Singletary is a 73 y.o. female who returns to me today for follow up of right shoulder pain.  She was last seen by me 3 months ago.  She is requesting a repeat injection today.  She states she is having trouble sleeping at night because the pain wakes her up.      PMH/PSH/FamHx/SocHx:    Unchanged from prior visit.    ROS:  Constitution: Negative for chills, fever and weakness.   Respiratory: Negative for cough and shortness of breath.   Musculoskeletal: Positive for right shoulder pain  Psychiatric/Behavioral: The patient is not nervous/anxious.       PHYSICAL EXAM:   Right shoulder  Skin intact  No warmth or effusion  ROM is limited and painful    ASSESSMENT/PLAN:    Diagnoses and all orders for this visit:    Osteoarthritis, localized, shoulder, right  -     X-Ray Shoulder Trauma 3 view Right; Future  -     Large Joint Aspiration/Injection: R subacromial bursa      - right shoulder CSI today  - Rest, ice as needed  - Follow up if symptoms worsen or fail to improve

## 2024-11-10 DIAGNOSIS — M81.0 OSTEOPOROSIS WITHOUT CURRENT PATHOLOGICAL FRACTURE, UNSPECIFIED OSTEOPOROSIS TYPE: ICD-10-CM

## 2024-11-10 RX ORDER — ALENDRONATE SODIUM 70 MG/1
70 TABLET ORAL
Qty: 12 TABLET | Refills: 3 | Status: SHIPPED | OUTPATIENT
Start: 2024-11-10

## 2024-11-10 NOTE — TELEPHONE ENCOUNTER
No care due was identified.  [facility] Embedded Care Due Messages. Reference number: [id number].   11/10/2024 3:26:18 PM CST

## 2024-12-09 ENCOUNTER — OFFICE VISIT (OUTPATIENT)
Dept: PRIMARY CARE CLINIC | Facility: CLINIC | Age: 74
End: 2024-12-09
Payer: MEDICARE

## 2024-12-09 VITALS
SYSTOLIC BLOOD PRESSURE: 132 MMHG | WEIGHT: 221 LBS | HEIGHT: 62 IN | DIASTOLIC BLOOD PRESSURE: 84 MMHG | TEMPERATURE: 98 F | OXYGEN SATURATION: 97 % | RESPIRATION RATE: 22 BRPM | HEART RATE: 80 BPM | BODY MASS INDEX: 40.67 KG/M2

## 2024-12-09 DIAGNOSIS — E66.01 MORBID OBESITY WITH BMI OF 40.0-44.9, ADULT: ICD-10-CM

## 2024-12-09 DIAGNOSIS — F51.05 INSOMNIA SECONDARY TO SITUATIONAL DEPRESSION: ICD-10-CM

## 2024-12-09 DIAGNOSIS — F43.21 INSOMNIA SECONDARY TO SITUATIONAL DEPRESSION: ICD-10-CM

## 2024-12-09 DIAGNOSIS — I20.89 OTHER FORMS OF ANGINA PECTORIS: ICD-10-CM

## 2024-12-09 DIAGNOSIS — R06.09 EXERTIONAL DYSPNEA: Primary | ICD-10-CM

## 2024-12-09 LAB
OHS QRS DURATION: 82 MS
OHS QTC CALCULATION: 440 MS

## 2024-12-09 PROCEDURE — 1159F MED LIST DOCD IN RCRD: CPT | Mod: CPTII,S$GLB,, | Performed by: FAMILY MEDICINE

## 2024-12-09 PROCEDURE — 3288F FALL RISK ASSESSMENT DOCD: CPT | Mod: CPTII,S$GLB,, | Performed by: FAMILY MEDICINE

## 2024-12-09 PROCEDURE — 93010 ELECTROCARDIOGRAM REPORT: CPT | Mod: S$GLB,,, | Performed by: INTERNAL MEDICINE

## 2024-12-09 PROCEDURE — 3075F SYST BP GE 130 - 139MM HG: CPT | Mod: CPTII,S$GLB,, | Performed by: FAMILY MEDICINE

## 2024-12-09 PROCEDURE — 3044F HG A1C LEVEL LT 7.0%: CPT | Mod: CPTII,S$GLB,, | Performed by: FAMILY MEDICINE

## 2024-12-09 PROCEDURE — 3008F BODY MASS INDEX DOCD: CPT | Mod: CPTII,S$GLB,, | Performed by: FAMILY MEDICINE

## 2024-12-09 PROCEDURE — 3079F DIAST BP 80-89 MM HG: CPT | Mod: CPTII,S$GLB,, | Performed by: FAMILY MEDICINE

## 2024-12-09 PROCEDURE — 1160F RVW MEDS BY RX/DR IN RCRD: CPT | Mod: CPTII,S$GLB,, | Performed by: FAMILY MEDICINE

## 2024-12-09 PROCEDURE — 99214 OFFICE O/P EST MOD 30 MIN: CPT | Mod: S$GLB,,, | Performed by: FAMILY MEDICINE

## 2024-12-09 PROCEDURE — 1101F PT FALLS ASSESS-DOCD LE1/YR: CPT | Mod: CPTII,S$GLB,, | Performed by: FAMILY MEDICINE

## 2024-12-09 PROCEDURE — 99999 PR PBB SHADOW E&M-EST. PATIENT-LVL V: CPT | Mod: PBBFAC,,, | Performed by: FAMILY MEDICINE

## 2024-12-09 PROCEDURE — 93005 ELECTROCARDIOGRAM TRACING: CPT | Mod: S$GLB,,, | Performed by: FAMILY MEDICINE

## 2024-12-09 RX ORDER — TRAZODONE HYDROCHLORIDE 50 MG/1
50-100 TABLET ORAL NIGHTLY PRN
Qty: 60 TABLET | Refills: 2 | Status: SHIPPED | OUTPATIENT
Start: 2024-12-09

## 2024-12-09 NOTE — PROGRESS NOTES
Assessment:       1. Exertional dyspnea    2. Morbid obesity with BMI of 40.0-44.9, adult    3. Insomnia secondary to situational depression    4. Other forms of angina pectoris         Plan:       Exertional dyspnea  -     EKG 12-lead  -     X-Ray Chest PA And Lateral; Future; Expected date: 12/09/2024  -     CBC Auto Differential; Future; Expected date: 12/09/2024  -     Comprehensive Metabolic Panel; Future; Expected date: 12/09/2024  -     NM Myocardial Perfusion Spect Multi Pharmacologic; Future; Expected date: 12/16/2024  -     Stress test; Future; Expected date: 12/16/2024    Morbid obesity with BMI of 40.0-44.9, adult    Insomnia secondary to situational depression  -     traZODone (DESYREL) 50 MG tablet; Take 1-2 tablets ( mg total) by mouth nightly as needed for Insomnia.  Dispense: 60 tablet; Refill: 2    Other forms of angina pectoris  -     NM Myocardial Perfusion Spect Multi Pharmacologic; Future; Expected date: 12/16/2024  -     Stress test; Future; Expected date: 12/16/2024      Assessment & Plan    - Evaluated patient's shortness of breath, which has worsened over 2 months  - Considered cardiac etiology given symptoms of dyspnea on exertion and lightheadedness  - Reviewed last echo from October 2020, which was normal  - EKG showed sinus rhythm with occasional PVCs  - Determined need for further cardiac workup to rule out blockage  - Planned nuclear stress test for risk stratification    CARDIOVASCULAR EXAMINATION:   Explained rationale for nuclear stress test to evaluate for potential cardiac blockage.   Described nuclear stress test procedure, emphasizing no treadmill walking required.   EKG performed.   Nuclear stress test ordered.   Office will schedule nuclear stress test.    SHORTNESS OF BREATH:   Labs (labs) ordered.   Chest XR ordered.    INSOMNIA:   Started Trazodone 50 mg tablets, 1-2 tablets at bedtime as needed for insomnia.    FOLLOW-UP:   Contact office with results of labs and  chest XR.       Medication List with Changes/Refills   New Medications    TRAZODONE (DESYREL) 50 MG TABLET    Take 1-2 tablets ( mg total) by mouth nightly as needed for Insomnia.   Current Medications    ALENDRONATE (FOSAMAX) 70 MG TABLET    TAKE 1 TABLET BY MOUTH EVERY 7 DAYS    ANASTROZOLE (ARIMIDEX) 1 MG TAB    Take 1 tablet by mouth once daily    ATORVASTATIN (LIPITOR) 20 MG TABLET    Take 1 tablet by mouth once daily    CALCIUM CARB,GLUC/MAG OX,GLUC (CALCIUM MAGNESIUM ORAL)    Take by mouth.    CARVEDILOL (COREG) 6.25 MG TABLET    Take 1 tablet by mouth twice daily    ESCITALOPRAM OXALATE (LEXAPRO) 20 MG TABLET    Take 1 tablet by mouth once daily    FUROSEMIDE (LASIX) 20 MG TABLET    Take 1 tablet (20 mg total) by mouth once daily.    GABAPENTIN (NEURONTIN) 300 MG CAPSULE    Take 1 capsule (300 mg total) by mouth every evening.    HYDROXYZINE HCL (ATARAX) 10 MG TAB    Take 1 tablet (10 mg total) by mouth 3 (three) times daily as needed (bladder pain).    MELOXICAM (MOBIC) 15 MG TABLET    Take 1 tablet by mouth once daily    MIRABEGRON (MYRBETRIQ) 25 MG TB24 ER TABLET    Take 1 tablet (25 mg total) by mouth once daily.    TROSPIUM (SANCTURA) 20 MG TAB TABLET    Take 1 tablet (20 mg total) by mouth 2 (two) times daily.    TURMERIC ORAL    Take 1 tablet by mouth once daily.    VALSARTAN-HYDROCHLOROTHIAZIDE (DIOVAN-HCT) 320-25 MG PER TABLET    Take 1 tablet by mouth once daily.    ZINC ACETATE ORAL    Take by mouth.   Discontinued Medications    MELATONIN 5 MG TAB    Take 1 tablet by mouth nightly as needed.         Subjective:    Patient ID: Julissa Singletary is a 74 y.o. female.  Chief Complaint: Shortness of Breath (Pt stated she has been having SOB when ambulating for the past two months. )    HPI  History of Present Illness    CHIEF COMPLAINT:  74-year-old female presents today for shortness of breath.    SHORTNESS OF BREATH:  She reports experiencing progressively worsening shortness of breath for  "two months. She needs to sit down after climbing one flight of stairs, requiring 2-3 minutes to catch her breath. She experiences breathlessness when walking in [employer] and can walk less than one block before needing to stop. She denies chest pain, coughing, or wheezing, but reports lightheadedness. She also denies recent weight gain or swelling in legs.    SLEEP DIFFICULTIES:  She reports trouble sleeping, describing waking up shortly after going to bed. Specifically, she mentions going to bed at 9:30 PM and waking up at 10:30 PM. She has been using Benadryl for sleep, but reports it has not been effective over the past week.    MEDICAL HISTORY:  She denies any known heart problems. Her last echocardiogram in October 2020 was normal. She has never been a smoker. No history of stress test reported.    MEDICATIONS:  She reports taking prescribed medications daily as directed.    SOCIAL HISTORY:  She has four children aged 13, 15, 18, and 19. She is currently grieving the loss of Jed, whose birthday and death anniversary both fall in December. She expresses difficulty coping with the holiday season due to her grief.      ROS:  Constitutional: -weight gain  Respiratory: +shortness of breath, -wheezing  Cardiovascular: -chest pain  Psychiatric: +sleep difficulty       Review of Systems    Objective:      Vitals:    12/09/24 1045   BP: 132/84   BP Location: Left arm   Patient Position: Sitting   Pulse: 80   Resp: (!) 22   Temp: 98 °F (36.7 °C)   TempSrc: Temporal   SpO2: 97%   Weight: 100.3 kg (221 lb 0.2 oz)   Height: 5' 2" (1.575 m)     BP Readings from Last 5 Encounters:   12/09/24 132/84   08/22/24 126/74   05/13/24 130/86   03/21/24 129/63   11/24/23 (!) 150/75     Wt Readings from Last 5 Encounters:   12/09/24 100.3 kg (221 lb 0.2 oz)   08/22/24 97.2 kg (214 lb 4.6 oz)   05/13/24 98.5 kg (217 lb 2.5 oz)   03/21/24 98.9 kg (218 lb 0.6 oz)   10/23/23 96.6 kg (212 lb 15.4 oz)     Physical Exam  Physical Exam  "   General: Well-developed. Well-nourished. No acute distress.  Eyes: EOMI. Sclerae anicteric.  HENT: Normocephalic. Atraumatic. Nares patent. Moist oral mucosa.  Cardiovascular: Regular rate. Regular rhythm. No murmurs. No rubs. No gallops. Normal S1, S2.  Respiratory: Normal respiratory effort. Clear to auscultation bilaterally. No rales. No rhonchi. No wheezing.  Musculoskeletal: No  obvious deformity.  Extremities: No lower extremity edema.  Neurological: Alert & oriented x3. No slurred speech. Normal gait.  Psychiatric: Normal mood. Normal affect. Good insight. Good judgment.  Skin: Warm. Dry. No rash.         Lab Results   Component Value Date    WBC 8.79 12/09/2024    HGB 12.4 12/09/2024    HCT 38.1 12/09/2024     12/09/2024    CHOL 174 05/14/2024    TRIG 65 05/14/2024    HDL 60 05/14/2024    ALT 17 05/14/2024    AST 21 05/14/2024     05/14/2024    K 3.7 05/14/2024     05/14/2024    CREATININE 0.9 05/14/2024    BUN 16 05/14/2024    CO2 25 05/14/2024    TSH 4.1 (H) 12/06/2004    INR 1.0 12/15/2004    HGBA1C 5.3 05/14/2024      This note was generated with the assistance of ambient listening technology. Verbal consent was obtained by the patient and accompanying visitor(s) for the recording of patient appointment to facilitate this note. I attest to having reviewed and edited the generated note for accuracy, though some syntax or spelling errors may persist. Please contact the author of this note for any clarification.

## 2024-12-11 ENCOUNTER — TELEPHONE (OUTPATIENT)
Dept: PRIMARY CARE CLINIC | Facility: CLINIC | Age: 74
End: 2024-12-11
Payer: MEDICARE

## 2024-12-11 ENCOUNTER — TELEPHONE (OUTPATIENT)
Dept: OBSTETRICS AND GYNECOLOGY | Facility: CLINIC | Age: 74
End: 2024-12-11
Payer: MEDICARE

## 2024-12-11 NOTE — TELEPHONE ENCOUNTER
----- Message from Clarice sent at 12/11/2024 10:53 AM CST -----  Regarding: Missed call  Contact: [phone]  Type:  Needs Medical Advice    Who Called: Julissa  Symptoms (please be specific): none   How long has patient had these symptoms:  none  Pharmacy name and phone #:  n/a  Would the patient rather a call back or a response via [facility]? call  Best Call Back Number: [phone]  Additional Information: Calling in regards to missed call from office. Please call and discuss.

## 2024-12-11 NOTE — TELEPHONE ENCOUNTER
----- Message from Clarice sent at 12/11/2024 10:53 AM CST -----  Regarding: Missed call  Contact: [phone]  Type:  Needs Medical Advice    Who Called: Julissa  Symptoms (please be specific): none   How long has patient had these symptoms:  none  Pharmacy name and phone #:  n/a  Would the patient rather a call back or a response via [facility]? call  Best Call Back Number: [phone]  Additional Information: Calling in regards to missed call from office. Please call and discuss.

## 2024-12-12 ENCOUNTER — OFFICE VISIT (OUTPATIENT)
Dept: ORTHOPEDICS | Facility: CLINIC | Age: 74
End: 2024-12-12
Payer: MEDICARE

## 2024-12-12 DIAGNOSIS — M19.011 OSTEOARTHRITIS, LOCALIZED, SHOULDER, RIGHT: Primary | ICD-10-CM

## 2024-12-12 PROCEDURE — 99999 PR PBB SHADOW E&M-EST. PATIENT-LVL III: CPT | Mod: PBBFAC,,, | Performed by: PHYSICIAN ASSISTANT

## 2024-12-12 RX ORDER — TRIAMCINOLONE ACETONIDE 40 MG/ML
40 INJECTION, SUSPENSION INTRA-ARTICULAR; INTRAMUSCULAR
Status: DISCONTINUED | OUTPATIENT
Start: 2024-12-12 | End: 2024-12-12 | Stop reason: HOSPADM

## 2024-12-12 RX ORDER — LIDOCAINE HYDROCHLORIDE 10 MG/ML
2 INJECTION, SOLUTION INFILTRATION; PERINEURAL
Status: DISCONTINUED | OUTPATIENT
Start: 2024-12-12 | End: 2024-12-12 | Stop reason: HOSPADM

## 2024-12-12 RX ADMIN — LIDOCAINE HYDROCHLORIDE 2 ML: 10 INJECTION, SOLUTION INFILTRATION; PERINEURAL at 11:12

## 2024-12-12 RX ADMIN — TRIAMCINOLONE ACETONIDE 40 MG: 40 INJECTION, SUSPENSION INTRA-ARTICULAR; INTRAMUSCULAR at 11:12

## 2024-12-12 NOTE — PROGRESS NOTES
Patient ID: Julissa Singletary is a 74 y.o. female.    HISTORY:  Julissa Singletary is a 74 y.o. female who returns to me today for follow up of right shoulder pain.  She requests repeat shoulder injection today.   She reports they still give her good relief.        PMH/PSH/FamHx/SocHx:    Unchanged from prior visit.    ROS:  Constitution: Negative for chills, fever and weakness.   Respiratory: Negative for cough and shortness of breath.   Musculoskeletal: Positive for right shoulder pain  Psychiatric/Behavioral: The patient is not nervous/anxious.       PHYSICAL EXAM:   Right shoulder  Skin intact  No warmth or effusion  ROM is limited at baseline    ASSESSMENT/PLAN:    Diagnoses and all orders for this visit:    Osteoarthritis, localized, shoulder, right  -     Large Joint Aspiration/Injection: R subacromial bursa    - right shoulder CSI today  - Rest, ice as needed  - She will be scheduled for 3 month follow up for repeat injection if needed

## 2024-12-12 NOTE — PROCEDURES
Large Joint Aspiration/Injection: R subacromial bursa    Date/Time: 12/12/2024 11:30 AM    Performed by: Natalya Carranza PA-C  Authorized by: Natalya Carranza PA-C    Consent Done?:  Yes (Verbal)  Indications:  Pain  Timeout: prior to procedure the correct patient, procedure, and site was verified    Prep: patient was prepped and draped in usual sterile fashion    Local anesthetic:  Topical anesthetic    Details:  Needle Size:  22 G  Approach:  Posterior  Location:  Shoulder  Site:  R subacromial bursa  Medications:  40 mg triamcinolone acetonide 40 mg/mL; 2 mL LIDOcaine HCL 10 mg/ml (1%) 10 mg/mL (1 %)  Patient tolerance:  Patient tolerated the procedure well with no immediate complications

## 2024-12-17 ENCOUNTER — TELEPHONE (OUTPATIENT)
Dept: PRIMARY CARE CLINIC | Facility: CLINIC | Age: 74
End: 2024-12-17
Payer: MEDICARE

## 2024-12-20 ENCOUNTER — TELEPHONE (OUTPATIENT)
Dept: PRIMARY CARE CLINIC | Facility: CLINIC | Age: 74
End: 2024-12-20
Payer: MEDICARE

## 2024-12-20 NOTE — TELEPHONE ENCOUNTER
----- Message from Mia sent at 12/20/2024  9:19 AM CST -----  Contact: [phone] Patient  2TESTRESULTS    Type: Test Results    What test was performed? Stress Test    Who ordered the test? Dr Dean Lowery    When and where were the test performed? [facility]  12/18/2024    Would you like a call back and or thru [facility]: Call Back Please. Thank you    Comments:

## 2024-12-27 DIAGNOSIS — M54.12 CERVICAL RADICULOPATHY: ICD-10-CM

## 2024-12-27 DIAGNOSIS — F41.1 GAD (GENERALIZED ANXIETY DISORDER): ICD-10-CM

## 2024-12-27 RX ORDER — ESCITALOPRAM OXALATE 20 MG/1
20 TABLET ORAL
Qty: 90 TABLET | Refills: 3 | Status: SHIPPED | OUTPATIENT
Start: 2024-12-27

## 2024-12-28 NOTE — TELEPHONE ENCOUNTER
Care Due:                  Date            Visit Type   Department     Provider  --------------------------------------------------------------------------------                                EP -                              PRIMARY      [facility] [place] [facility]  Last Visit: 12-      CARE ([facility])   PRIMARY CARE   Dean Lowery  Next Visit: None Scheduled  None         None Found                                                            Last  Test          Frequency    Reason                     Performed    Due Date  --------------------------------------------------------------------------------    Mg Level....  12 months..  alendronate..............  Not Found    Overdue    Phosphate...  12 months..  alendronate..............  Not Found    Overdue    [employer] Embedded Care Due Messages. Reference number: [id number].   12/27/2024 6:19:44 PM CST

## 2024-12-28 NOTE — TELEPHONE ENCOUNTER
Provider Staff:  Action required for this patient    Requires labs      Please see care gap opportunities below in Care Due Message.    Thanks!  [facility]     Appointments      Date Provider   Last Visit   12/9/2024 Dean Lowery MD   Next Visit   Visit date not found Dean Lowery MD     Refill Decision Note   Julissa Singletary  is requesting a refill authorization.  Brief Assessment and Rationale for Refill:  Approve     Medication Therapy Plan:         Comments:     Note composed:7:49 PM 12/27/2024

## 2024-12-30 RX ORDER — GABAPENTIN 300 MG/1
300 CAPSULE ORAL
Qty: 30 CAPSULE | Refills: 0 | OUTPATIENT
Start: 2024-12-30

## 2025-02-17 RX ORDER — FUROSEMIDE 20 MG/1
20 TABLET ORAL DAILY
Qty: 90 TABLET | Refills: 1 | Status: SHIPPED | OUTPATIENT
Start: 2025-02-17

## 2025-02-17 NOTE — TELEPHONE ENCOUNTER
----- Message from Mary sent at 2/17/2025 11:01 AM CST -----  Contact: [phone] Patient  Requesting an RX refill or new RX.Is this a refill or new RX: newRX name and strength (copy/paste from chart):  diuretic (prescribed by [facility]) [facility]: Pharmacy name and phone # (copy/paste from chart):  [facility] - [place] - [address] [place] [address]D[place]g: [phone] Fax: [phone] doctors have asked that we provide their patients with the following 2 reminders -- prescription refills can take up to 72 hours, and a friendly reminder that in the future you can use your [facility] account to request refills: yesComments: Pt would like a call back with any questions and once completed.

## 2025-02-17 NOTE — TELEPHONE ENCOUNTER
Care Due:                  Date            Visit Type   Department     Provider  --------------------------------------------------------------------------------                                EP -                              PRIMARY      [facility]  Last Visit: 12-      CARE ([facility])   PRIMARY CARE   Dean Lowery  Next Visit: None Scheduled  None         None Found                                                            Last  Test          Frequency    Reason                     Performed    Due Date  --------------------------------------------------------------------------------    CMP.........  12 months..  alendronate,               05- 05-                             atorvastatin, furosemide,                             valsartan-hydrochlorothia                             zide.....................    Lipid Panel.  12 months..  atorvastatin.............  05- 05-    [facility] Embedded Care Due Messages. Reference number: [id number].   2/17/2025 11:39:45 AM CST

## 2025-03-24 DIAGNOSIS — Z00.00 ENCOUNTER FOR MEDICARE ANNUAL WELLNESS EXAM: ICD-10-CM

## 2025-03-26 ENCOUNTER — OFFICE VISIT (OUTPATIENT)
Dept: PRIMARY CARE CLINIC | Facility: CLINIC | Age: 75
End: 2025-03-26
Payer: MEDICARE

## 2025-03-26 VITALS
TEMPERATURE: 98 F | HEIGHT: 62 IN | BODY MASS INDEX: 40.38 KG/M2 | SYSTOLIC BLOOD PRESSURE: 128 MMHG | HEART RATE: 95 BPM | DIASTOLIC BLOOD PRESSURE: 82 MMHG | OXYGEN SATURATION: 97 % | WEIGHT: 219.44 LBS | RESPIRATION RATE: 18 BRPM

## 2025-03-26 DIAGNOSIS — C50.211 MALIGNANT NEOPLASM OF UPPER-INNER QUADRANT OF RIGHT BREAST IN FEMALE, ESTROGEN RECEPTOR POSITIVE: ICD-10-CM

## 2025-03-26 DIAGNOSIS — G81.01 FLACCID HEMIPLEGIA OF RIGHT DOMINANT SIDE DUE TO NONCEREBROVASCULAR ETIOLOGY: ICD-10-CM

## 2025-03-26 DIAGNOSIS — E78.5 HYPERLIPIDEMIA, UNSPECIFIED HYPERLIPIDEMIA TYPE: ICD-10-CM

## 2025-03-26 DIAGNOSIS — I10 ESSENTIAL HYPERTENSION: ICD-10-CM

## 2025-03-26 DIAGNOSIS — Z12.31 ENCOUNTER FOR SCREENING MAMMOGRAM FOR BREAST CANCER: ICD-10-CM

## 2025-03-26 DIAGNOSIS — E66.01 MORBID OBESITY WITH BMI OF 40.0-44.9, ADULT: ICD-10-CM

## 2025-03-26 DIAGNOSIS — Z78.0 ASYMPTOMATIC MENOPAUSE: ICD-10-CM

## 2025-03-26 DIAGNOSIS — Z12.12 ENCOUNTER FOR COLORECTAL CANCER SCREENING: ICD-10-CM

## 2025-03-26 DIAGNOSIS — Z12.11 ENCOUNTER FOR COLORECTAL CANCER SCREENING: ICD-10-CM

## 2025-03-26 DIAGNOSIS — Z00.00 ANNUAL PHYSICAL EXAM: Primary | ICD-10-CM

## 2025-03-26 DIAGNOSIS — Z17.0 MALIGNANT NEOPLASM OF UPPER-INNER QUADRANT OF RIGHT BREAST IN FEMALE, ESTROGEN RECEPTOR POSITIVE: ICD-10-CM

## 2025-03-26 PROCEDURE — 3008F BODY MASS INDEX DOCD: CPT | Mod: CPTII,S$GLB,, | Performed by: FAMILY MEDICINE

## 2025-03-26 PROCEDURE — 3079F DIAST BP 80-89 MM HG: CPT | Mod: CPTII,S$GLB,, | Performed by: FAMILY MEDICINE

## 2025-03-26 PROCEDURE — 1125F AMNT PAIN NOTED PAIN PRSNT: CPT | Mod: CPTII,S$GLB,, | Performed by: FAMILY MEDICINE

## 2025-03-26 PROCEDURE — 3074F SYST BP LT 130 MM HG: CPT | Mod: CPTII,S$GLB,, | Performed by: FAMILY MEDICINE

## 2025-03-26 PROCEDURE — 1101F PT FALLS ASSESS-DOCD LE1/YR: CPT | Mod: CPTII,S$GLB,, | Performed by: FAMILY MEDICINE

## 2025-03-26 PROCEDURE — 3288F FALL RISK ASSESSMENT DOCD: CPT | Mod: CPTII,S$GLB,, | Performed by: FAMILY MEDICINE

## 2025-03-26 PROCEDURE — 1159F MED LIST DOCD IN RCRD: CPT | Mod: CPTII,S$GLB,, | Performed by: FAMILY MEDICINE

## 2025-03-26 PROCEDURE — 99397 PER PM REEVAL EST PAT 65+ YR: CPT | Mod: S$GLB,,, | Performed by: FAMILY MEDICINE

## 2025-03-26 PROCEDURE — 99999 PR PBB SHADOW E&M-EST. PATIENT-LVL V: CPT | Mod: PBBFAC,,, | Performed by: FAMILY MEDICINE

## 2025-03-26 PROCEDURE — 1160F RVW MEDS BY RX/DR IN RCRD: CPT | Mod: CPTII,S$GLB,, | Performed by: FAMILY MEDICINE

## 2025-03-26 NOTE — PROGRESS NOTES
Assessment:       1. Annual physical exam    2. Encounter for screening mammogram for breast cancer    3. Encounter for colorectal cancer screening    4. Asymptomatic menopause    5. Morbid obesity with BMI of 40.0-44.9, adult    6. Malignant neoplasm of upper-inner quadrant of right breast in female, estrogen receptor positive    7. Flaccid hemiplegia of right dominant side due to noncerebrovascular etiology    8. Essential hypertension    9. Hyperlipidemia, unspecified hyperlipidemia type         Plan:       Annual physical exam  -     CBC Auto Differential; Future; Expected date: 03/26/2025  -     Comprehensive Metabolic Panel; Future; Expected date: 03/26/2025  -     Lipid Panel; Future; Expected date: 03/26/2025    Encounter for screening mammogram for breast cancer  -     Mammo Digital Screening Bilat w/ Esau (XPD); Future; Expected date: 06/26/2025    Encounter for colorectal cancer screening  -     Fecal Immunochemical Test (iFOBT); Future; Expected date: 03/26/2025    Asymptomatic menopause  -     DXA Bone Density Axial Skeleton 1 or more sites; Future; Expected date: 06/26/2025    Morbid obesity with BMI of 40.0-44.9, adult    Malignant neoplasm of upper-inner quadrant of right breast in female, estrogen receptor positive    Flaccid hemiplegia of right dominant side due to noncerebrovascular etiology    Essential hypertension  -     CBC Auto Differential; Future; Expected date: 03/26/2025  -     Comprehensive Metabolic Panel; Future; Expected date: 03/26/2025    Hyperlipidemia, unspecified hyperlipidemia type  -     Comprehensive Metabolic Panel; Future; Expected date: 03/26/2025  -     Lipid Panel; Future; Expected date: 03/26/2025      Assessment & Plan    - Assessed overall health status and complaints, including mobility issues, bruising, and leg itching.  - Reviewed breast cancer history and current management with Anastrozole.  - Evaluated cardiovascular health, noting recent normal nuclear stress  test and good BP control.  - Considered bone health and cancer screening needs.    MILD COGNITIVE IMPAIRMENT:   Noted the patient's reports of difficulty remembering things and managing bills.   Observed memory issues during the conversation with the patient.   Acknowledged the patient's memory concerns.   Scheduled cognitive tests to further evaluate the patient's condition.   Planned a follow-up visit in 1 year to reassess cognitive function.    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    LEG PAIN:   Documented the patient's reports of leg pain affecting mobility.   Performed a physical exam to assess the leg pain.    SPONTANEOUS ECCHYMOSES:   Noted the patient's reports of unexplained bruising.    BULLOUS DISORDER:   Documented the patient's reports of itching and blisters on the leg.    CHEST PAIN:   Noted the patient's reports of intermittent chest pain below the breast.   Reviewed the recent normal nuclear stress test results.    ESSENTIAL HYPERTENSION:   Continued Telmisartan for blood pressure management.   Noted the patient's reports of good blood pressure control.   Prescribed Telmisartan for ongoing hypertension management.       Medication List with Changes/Refills   Current Medications    ALENDRONATE (FOSAMAX) 70 MG TABLET    TAKE 1 TABLET BY MOUTH EVERY 7 DAYS    ANASTROZOLE (ARIMIDEX) 1 MG TAB    Take 1 tablet by mouth once daily    ATORVASTATIN (LIPITOR) 20 MG TABLET    Take 1 tablet by mouth once daily    CALCIUM CARB,GLUC/MAG OX,GLUC (CALCIUM MAGNESIUM ORAL)    Take by mouth.    CARVEDILOL (COREG) 6.25 MG TABLET    Take 1 tablet by mouth twice daily    ESCITALOPRAM OXALATE (LEXAPRO) 20 MG TABLET    Take 1 tablet by mouth once daily    FUROSEMIDE (LASIX) 20 MG TABLET    Take 1 tablet (20 mg total) by mouth once daily.    GABAPENTIN (NEURONTIN) 300 MG CAPSULE    Take 1 capsule (300 mg total) by mouth every evening.    HYDROXYZINE HCL  (ATARAX) 10 MG TAB    Take 1 tablet (10 mg total) by mouth 3 (three) times daily as needed (bladder pain).    MELOXICAM (MOBIC) 15 MG TABLET    Take 1 tablet by mouth once daily    MIRABEGRON (MYRBETRIQ) 25 MG TB24 ER TABLET    Take 1 tablet (25 mg total) by mouth once daily.    TRAZODONE (DESYREL) 50 MG TABLET    Take 1-2 tablets ( mg total) by mouth nightly as needed for Insomnia.    TROSPIUM (SANCTURA) 20 MG TAB TABLET    Take 1 tablet (20 mg total) by mouth 2 (two) times daily.    TURMERIC ORAL    Take 1 tablet by mouth once daily.    VALSARTAN-HYDROCHLOROTHIAZIDE (DIOVAN-HCT) 320-25 MG PER TABLET    Take 1 tablet by mouth once daily.    ZINC ACETATE ORAL    Take by mouth.         Subjective:    Patient ID: Julissa Singletary is a 74 y.o. female.  Chief Complaint: Annual Exam    HPI  History of Present Illness    CHIEF COMPLAINT:  Patient presents today for annual follow-up and multiple complaints    NEUROLOGICAL SYMPTOMS:  She reports significant memory issues affecting daily functioning, requiring her daughter to take over bill management due to forgetfulness. Her right side remains affected from previous encephalitis.    MUSCULOSKELETAL AND SKIN:  She reports difficulty getting in and out of car due to leg pain. She notes unexplained bruising in multiple areas and itching with vesicular lesions on leg.    CHEST PAIN:  She reports chest pain located below breast occurring 3-4 times in the past month. Nuclear stress test was normal a couple months ago.    CANCER SCREENING AND PREVENTION:  Last mammogram in June of previous year was clear. She continues taking Anastrozole for breast cancer prevention. Previous colonoscopy was normal with no polyps identified.      ROS:  Cardiovascular: +chest pain, -lower extremity edema  Musculoskeletal: +limb pain, +pain with movement  Integumentary: +itching  Neurological: +memory loss  Hematologic/Lymphatic: +easy bruising       Review of Systems    Objective:     "  Vitals:    03/26/25 0901   BP: 128/82   BP Location: Left arm   Patient Position: Sitting   Pulse: 95   Resp: 18   Temp: 97.5 °F (36.4 °C)   TempSrc: Temporal   SpO2: 97%   Weight: 99.6 kg (219 lb 7.5 oz)   Height: 5' 2" (1.575 m)     BP Readings from Last 5 Encounters:   03/26/25 128/82   12/09/24 132/84   08/22/24 126/74   05/13/24 130/86   03/21/24 129/63     Wt Readings from Last 5 Encounters:   03/26/25 99.6 kg (219 lb 7.5 oz)   12/09/24 100.3 kg (221 lb 0.2 oz)   08/22/24 97.2 kg (214 lb 4.6 oz)   05/13/24 98.5 kg (217 lb 2.5 oz)   03/21/24 98.9 kg (218 lb 0.6 oz)     Physical Exam  Physical Exam    General: Well-developed. Well-nourished. No acute distress.  Eyes: EOMI. Sclerae anicteric.  HENT: Normocephalic. Atraumatic. Nares patent. Moist oral mucosa.  Cardiovascular: Regular rate. Regular rhythm. No murmurs. No rubs. No gallops. Normal S1, S2.  Respiratory: Normal respiratory effort. Clear to auscultation bilaterally. No rales. No rhonchi. No wheezing.  Musculoskeletal: No  obvious deformity.  Extremities: No lower extremity edema.  Neurological: Alert & oriented x3. No slurred speech. Normal gait. Right hemiparesis  Psychiatric: Normal mood. Normal affect. Good insight. Good judgment.  Skin: Warm. Dry. No rash.         Lab Results   Component Value Date    WBC 8.35 03/26/2025    HGB 12.8 03/26/2025    HCT 38.2 03/26/2025     03/26/2025    CHOL 174 05/14/2024    TRIG 65 05/14/2024    HDL 60 05/14/2024    ALT 17 05/14/2024    AST 21 05/14/2024     05/14/2024    K 3.7 05/14/2024     05/14/2024    CREATININE 0.9 05/14/2024    BUN 16 05/14/2024    CO2 25 05/14/2024    TSH 4.1 (H) 12/06/2004    INR 1.0 12/15/2004    HGBA1C 5.3 05/14/2024      This note was generated with the assistance of ambient listening technology. Verbal consent was obtained by the patient and accompanying visitor(s) for the recording of patient appointment to facilitate this note. I attest to having reviewed and " edited the generated note for accuracy, though some syntax or spelling errors may persist. Please contact the author of this note for any clarification.

## 2025-03-30 ENCOUNTER — RESULTS FOLLOW-UP (OUTPATIENT)
Dept: PRIMARY CARE CLINIC | Facility: CLINIC | Age: 75
End: 2025-03-30

## 2025-03-30 DIAGNOSIS — E87.6 HYPOKALEMIA: Primary | ICD-10-CM

## 2025-03-30 RX ORDER — POTASSIUM CHLORIDE 20 MEQ/1
20 TABLET, EXTENDED RELEASE ORAL DAILY
Qty: 5 TABLET | Refills: 0 | Status: SHIPPED | OUTPATIENT
Start: 2025-03-30 | End: 2025-04-04

## 2025-04-28 RX ORDER — POTASSIUM CHLORIDE 1500 MG/1
20 TABLET, EXTENDED RELEASE ORAL
Qty: 5 TABLET | Refills: 0 | OUTPATIENT
Start: 2025-04-28

## 2025-04-30 RX ORDER — FUROSEMIDE 20 MG/1
20 TABLET ORAL DAILY
Qty: 90 TABLET | Refills: 1 | Status: SHIPPED | OUTPATIENT
Start: 2025-04-30

## 2025-05-26 ENCOUNTER — TELEPHONE (OUTPATIENT)
Dept: PRIMARY CARE CLINIC | Facility: CLINIC | Age: 75
End: 2025-05-26
Payer: MEDICARE

## 2025-05-26 NOTE — TELEPHONE ENCOUNTER
Called pt regarding message. Pt stated her left leg has been itching. Symptoms started 1 week ago.Pt stated she does not have a rash. Pt is requesting PCP recommendation. Pt stated she has tried benadryl.

## 2025-05-26 NOTE — TELEPHONE ENCOUNTER
----- Message from Carole sent at 5/26/2025  7:21 AM CDT -----  Regarding: Pt called to speak to the nurse regarding her care to find out what medication she can use for leg itching  Type:  Needs Medical AdviceWho Called: Pt called to speak to the nurse regarding her care to find out what medication she can use for leg itchingHow long has patient had these symptoms:  1 weekPharmacy name and phone #:  [facility] - [id number], [place] - [address] Phone: [phone] the patient rather a call back or a response via [facility]?  Call [facility] Call Back Number: [phone]

## 2025-05-26 NOTE — TELEPHONE ENCOUNTER
Recommend that she take OTC Zyrtec 10 mg daily and use Benadryl 25 mg every 4-6 hours for breakthrough

## 2025-05-26 NOTE — TELEPHONE ENCOUNTER
Informed pt recommend that she take OTC Zyrtec 10 mg daily and use Benadryl 25 mg every 4-6 hours for breakthrough

## 2025-06-06 DIAGNOSIS — F43.21 INSOMNIA SECONDARY TO SITUATIONAL DEPRESSION: ICD-10-CM

## 2025-06-06 DIAGNOSIS — F51.05 INSOMNIA SECONDARY TO SITUATIONAL DEPRESSION: ICD-10-CM

## 2025-06-06 DIAGNOSIS — I10 ESSENTIAL HYPERTENSION: ICD-10-CM

## 2025-06-06 NOTE — TELEPHONE ENCOUNTER
Care Due:                  Date            Visit Type   Department     Provider  --------------------------------------------------------------------------------                                EP -                              PRIMARY      [facility]  Last Visit: 03-      CARE ([facility])   PRIMARY CARE   Dean Lowery  Next Visit: None Scheduled  None         None Found                                                            Last  Test          Frequency    Reason                     Performed    Due Date  --------------------------------------------------------------------------------    Mg Level....  12 months..  alendronate..............  Not Found    Overdue    Phosphate...  12 months..  alendronate..............  Not Found    Overdue    [employer] Embedded Care Due Messages. Reference number: [id number].   6/06/2025 12:52:02 PM CDT

## 2025-06-08 RX ORDER — ATORVASTATIN CALCIUM 20 MG/1
20 TABLET, FILM COATED ORAL
Qty: 90 TABLET | Refills: 3 | Status: SHIPPED | OUTPATIENT
Start: 2025-06-08

## 2025-06-08 RX ORDER — TRAZODONE HYDROCHLORIDE 50 MG/1
TABLET ORAL
Qty: 180 TABLET | Refills: 3 | Status: SHIPPED | OUTPATIENT
Start: 2025-06-08

## 2025-06-08 RX ORDER — VALSARTAN AND HYDROCHLOROTHIAZIDE 320; 25 MG/1; MG/1
1 TABLET, FILM COATED ORAL DAILY
Qty: 90 TABLET | Refills: 3 | Status: SHIPPED | OUTPATIENT
Start: 2025-06-08 | End: 2025-06-08 | Stop reason: CLARIF

## 2025-06-08 NOTE — TELEPHONE ENCOUNTER
Refill Routing Note   Medication(s) are not appropriate for processing by [facility] for the following reason(s):        Required labs abnormal: Valsartan, K labs  Drug-disease interaction: carvediloL and Mild persistent asthma     [facility] action(s):  Defer  Approve             Appointments  past 12m or future 3m with PCP    Date Provider   Last Visit   3/26/2025 Dean Lowery MD   Next Visit   Visit date not found Dean Lowery MD   ED visits in past 90 days: 0        Note composed:5:19 AM 06/08/2025

## 2025-06-09 RX ORDER — VALSARTAN AND HYDROCHLOROTHIAZIDE 320; 25 MG/1; MG/1
1 TABLET, FILM COATED ORAL DAILY
Qty: 90 TABLET | Refills: 3 | Status: SHIPPED | OUTPATIENT
Start: 2025-06-09

## 2025-06-09 RX ORDER — CARVEDILOL 6.25 MG/1
6.25 TABLET ORAL 2 TIMES DAILY
Qty: 180 TABLET | Refills: 3 | Status: SHIPPED | OUTPATIENT
Start: 2025-06-09

## 2025-06-27 DIAGNOSIS — Z17.0 MALIGNANT NEOPLASM OF UPPER-INNER QUADRANT OF RIGHT BREAST IN FEMALE, ESTROGEN RECEPTOR POSITIVE: ICD-10-CM

## 2025-06-27 DIAGNOSIS — C50.211 MALIGNANT NEOPLASM OF UPPER-INNER QUADRANT OF RIGHT BREAST IN FEMALE, ESTROGEN RECEPTOR POSITIVE: ICD-10-CM

## 2025-06-27 RX ORDER — ANASTROZOLE 1 MG/1
1 TABLET ORAL
Qty: 90 TABLET | Refills: 0 | OUTPATIENT
Start: 2025-06-27

## 2025-06-30 RX ORDER — ANASTROZOLE 1 MG/1
1 TABLET ORAL DAILY
Qty: 90 TABLET | Refills: 3 | Status: SHIPPED | OUTPATIENT
Start: 2025-06-30

## 2025-08-18 DIAGNOSIS — M54.12 CERVICAL RADICULOPATHY: ICD-10-CM

## 2025-08-19 PROBLEM — C50.911 BREAST CANCER, RIGHT: Status: RESOLVED | Noted: 2020-08-28 | Resolved: 2025-08-19

## 2025-08-19 RX ORDER — MELOXICAM 15 MG/1
15 TABLET ORAL
Qty: 90 TABLET | Refills: 3 | Status: SHIPPED | OUTPATIENT
Start: 2025-08-19

## 2025-08-20 ENCOUNTER — OFFICE VISIT (OUTPATIENT)
Dept: PRIMARY CARE CLINIC | Facility: CLINIC | Age: 75
End: 2025-08-20
Payer: MEDICARE

## 2025-08-20 VITALS
DIASTOLIC BLOOD PRESSURE: 76 MMHG | SYSTOLIC BLOOD PRESSURE: 118 MMHG | HEART RATE: 94 BPM | BODY MASS INDEX: 40.67 KG/M2 | HEIGHT: 62 IN | WEIGHT: 221 LBS | TEMPERATURE: 98 F | RESPIRATION RATE: 14 BRPM | OXYGEN SATURATION: 96 %

## 2025-08-20 DIAGNOSIS — S20.112A: Primary | ICD-10-CM

## 2025-08-20 DIAGNOSIS — L08.9: Primary | ICD-10-CM

## 2025-08-20 DIAGNOSIS — M81.0 AGE-RELATED OSTEOPOROSIS WITHOUT CURRENT PATHOLOGICAL FRACTURE: ICD-10-CM

## 2025-08-20 DIAGNOSIS — N39.46 MIXED INCONTINENCE URGE AND STRESS: ICD-10-CM

## 2025-08-20 PROBLEM — B19.10 HEPATITIS B: Status: RESOLVED | Noted: 2017-10-09 | Resolved: 2025-08-20

## 2025-08-20 PROCEDURE — 3288F FALL RISK ASSESSMENT DOCD: CPT | Mod: CPTII,S$GLB,, | Performed by: FAMILY MEDICINE

## 2025-08-20 PROCEDURE — 99999 PR PBB SHADOW E&M-EST. PATIENT-LVL IV: CPT | Mod: PBBFAC,,, | Performed by: FAMILY MEDICINE

## 2025-08-20 PROCEDURE — 3074F SYST BP LT 130 MM HG: CPT | Mod: CPTII,S$GLB,, | Performed by: FAMILY MEDICINE

## 2025-08-20 PROCEDURE — 3008F BODY MASS INDEX DOCD: CPT | Mod: CPTII,S$GLB,, | Performed by: FAMILY MEDICINE

## 2025-08-20 PROCEDURE — 1125F AMNT PAIN NOTED PAIN PRSNT: CPT | Mod: CPTII,S$GLB,, | Performed by: FAMILY MEDICINE

## 2025-08-20 PROCEDURE — 1160F RVW MEDS BY RX/DR IN RCRD: CPT | Mod: CPTII,S$GLB,, | Performed by: FAMILY MEDICINE

## 2025-08-20 PROCEDURE — 99214 OFFICE O/P EST MOD 30 MIN: CPT | Mod: S$GLB,,, | Performed by: FAMILY MEDICINE

## 2025-08-20 PROCEDURE — 1159F MED LIST DOCD IN RCRD: CPT | Mod: CPTII,S$GLB,, | Performed by: FAMILY MEDICINE

## 2025-08-20 PROCEDURE — 1101F PT FALLS ASSESS-DOCD LE1/YR: CPT | Mod: CPTII,S$GLB,, | Performed by: FAMILY MEDICINE

## 2025-08-20 PROCEDURE — 3078F DIAST BP <80 MM HG: CPT | Mod: CPTII,S$GLB,, | Performed by: FAMILY MEDICINE

## 2025-08-20 RX ORDER — MUPIROCIN 20 MG/G
OINTMENT TOPICAL 3 TIMES DAILY
Qty: 22 G | Refills: 1 | Status: SHIPPED | OUTPATIENT
Start: 2025-08-20

## 2025-08-20 RX ORDER — SULFAMETHOXAZOLE AND TRIMETHOPRIM 800; 160 MG/1; MG/1
1 TABLET ORAL 2 TIMES DAILY
Qty: 14 TABLET | Refills: 0 | Status: SHIPPED | OUTPATIENT
Start: 2025-08-20 | End: 2025-08-27

## 2025-08-20 RX ORDER — MIRABEGRON 25 MG/1
25 TABLET, FILM COATED, EXTENDED RELEASE ORAL DAILY
Qty: 30 TABLET | Refills: 11 | Status: SHIPPED | OUTPATIENT
Start: 2025-08-20

## 2025-08-29 ENCOUNTER — TELEPHONE (OUTPATIENT)
Dept: PRIMARY CARE CLINIC | Facility: CLINIC | Age: 75
End: 2025-08-29
Payer: MEDICARE

## 2025-09-03 ENCOUNTER — HOSPITAL ENCOUNTER (OUTPATIENT)
Dept: RADIOLOGY | Facility: HOSPITAL | Age: 75
Discharge: HOME | End: 2025-09-03
Attending: FAMILY MEDICINE
Payer: MEDICARE

## 2025-09-03 ENCOUNTER — TELEPHONE (OUTPATIENT)
Dept: HEMATOLOGY/ONCOLOGY | Facility: CLINIC | Age: 75
End: 2025-09-03
Payer: MEDICARE

## 2025-09-03 DIAGNOSIS — Z12.31 ENCOUNTER FOR SCREENING MAMMOGRAM FOR BREAST CANCER: ICD-10-CM

## 2025-09-03 PROCEDURE — 77067 SCR MAMMO BI INCL CAD: CPT | Mod: TC

## 2025-09-04 ENCOUNTER — OFFICE VISIT (OUTPATIENT)
Dept: HEMATOLOGY/ONCOLOGY | Facility: CLINIC | Age: 75
End: 2025-09-04
Payer: MEDICARE

## 2025-09-04 ENCOUNTER — TELEPHONE (OUTPATIENT)
Dept: PRIMARY CARE CLINIC | Facility: CLINIC | Age: 75
End: 2025-09-04
Payer: MEDICARE

## 2025-09-04 VITALS
HEART RATE: 77 BPM | HEIGHT: 62 IN | OXYGEN SATURATION: 95 % | WEIGHT: 220.69 LBS | DIASTOLIC BLOOD PRESSURE: 77 MMHG | SYSTOLIC BLOOD PRESSURE: 127 MMHG | BODY MASS INDEX: 40.61 KG/M2

## 2025-09-04 DIAGNOSIS — R21 RASH: Primary | ICD-10-CM

## 2025-09-04 DIAGNOSIS — E78.5 HYPERLIPIDEMIA, UNSPECIFIED HYPERLIPIDEMIA TYPE: ICD-10-CM

## 2025-09-04 DIAGNOSIS — C50.211 MALIGNANT NEOPLASM OF UPPER-INNER QUADRANT OF RIGHT BREAST IN FEMALE, ESTROGEN RECEPTOR POSITIVE: Primary | ICD-10-CM

## 2025-09-04 DIAGNOSIS — I10 ESSENTIAL HYPERTENSION: ICD-10-CM

## 2025-09-04 DIAGNOSIS — Z79.811 PROPHYLACTIC USE OF ANASTROZOLE (ARIMIDEX): ICD-10-CM

## 2025-09-04 DIAGNOSIS — C50.919 INFILTRATING DUCTAL CARCINOMA: ICD-10-CM

## 2025-09-04 DIAGNOSIS — Z17.0 MALIGNANT NEOPLASM OF UPPER-INNER QUADRANT OF RIGHT BREAST IN FEMALE, ESTROGEN RECEPTOR POSITIVE: Primary | ICD-10-CM

## 2025-09-04 PROCEDURE — 99999 PR PBB SHADOW E&M-EST. PATIENT-LVL IV: CPT | Mod: PBBFAC,,, | Performed by: NURSE PRACTITIONER

## 2025-09-04 RX ORDER — NYSTATIN AND TRIAMCINOLONE ACETONIDE 100000; 1 [USP'U]/G; MG/G
CREAM TOPICAL 2 TIMES DAILY
Qty: 30 G | Refills: 1 | Status: SHIPPED | OUTPATIENT
Start: 2025-09-04 | End: 2025-09-18

## (undated) DEVICE — ELECTRODE REM PLYHSV RETURN 9

## (undated) DEVICE — SUT VICRYL 3-0 27 SH

## (undated) DEVICE — TRAY MINOR GEN SURG

## (undated) DEVICE — NDL HYPO REG 25G X 1 1/2

## (undated) DEVICE — DRESSING LEUKOPLAST FLEX 1X3IN

## (undated) DEVICE — SOL 9P NACL IRR PIC IL

## (undated) DEVICE — SEE MEDLINE ITEM 157128

## (undated) DEVICE — SUT MCRYL PLUS 4-0 PS2 27IN

## (undated) DEVICE — ELECTRODE BLADE INSULATED 1 IN

## (undated) DEVICE — GAUZE SPONGE 4X4 12PLY

## (undated) DEVICE — ADHESIVE DERMABOND ADVANCED

## (undated) DEVICE — APPLIER LIGACLIP MED 11IN

## (undated) DEVICE — SHEATH GUIDE SCOUT SURG RADAR

## (undated) DEVICE — DRAPE STERI INSTRUMENT 1018

## (undated) DEVICE — SEE MEDLINE ITEM 152622

## (undated) DEVICE — BRA POST SURGICAL WHT 40-42IN

## (undated) DEVICE — SET PULL UP PROBE COVER 5X48IN

## (undated) DEVICE — NDL 18GA X1 1/2 REG BEVEL

## (undated) DEVICE — SUT 2/0 30IN SILK BLK BRAI

## (undated) DEVICE — NEOGUARD COVER 4X30CM STERILE

## (undated) DEVICE — STOCKINETTE DBL PLY ST 4X

## (undated) DEVICE — SEE MEDLINE ITEM 146417

## (undated) DEVICE — PACK UNIVERSAL SPLIT II

## (undated) DEVICE — COVER PROBE NL STRL 3.6X96IN

## (undated) DEVICE — MARGIN MARKER STANDARD 6 COLOR

## (undated) DEVICE — GAUZE FLUFF XXLG 36X36 2 PLY

## (undated) DEVICE — GLOVE BIOGEL SKINSENSE PI 7.5

## (undated) DEVICE — SPONGE LAP 18X18 PREWASHED

## (undated) DEVICE — BLADE ELECTRO EDGE INSULATED

## (undated) DEVICE — CONTAINER SPECIMEN STRL 4OZ

## (undated) DEVICE — GLOVE BIOGEL SKINSENSE PI 6.5

## (undated) DEVICE — SEE MEDLINE ITEM 157131

## (undated) DEVICE — SYR DISP LL 5CC